# Patient Record
Sex: MALE | Race: WHITE | NOT HISPANIC OR LATINO | Employment: OTHER | URBAN - METROPOLITAN AREA
[De-identification: names, ages, dates, MRNs, and addresses within clinical notes are randomized per-mention and may not be internally consistent; named-entity substitution may affect disease eponyms.]

---

## 2023-04-18 ENCOUNTER — HOSPITAL ENCOUNTER (OUTPATIENT)
Dept: DATA CONVERSION | Facility: HOSPITAL | Age: 63
End: 2023-04-18
Attending: ORTHOPAEDIC SURGERY | Admitting: ORTHOPAEDIC SURGERY
Payer: COMMERCIAL

## 2023-04-18 DIAGNOSIS — J43.9 EMPHYSEMA, UNSPECIFIED (MULTI): ICD-10-CM

## 2023-04-18 DIAGNOSIS — Z96.659 PRESENCE OF UNSPECIFIED ARTIFICIAL KNEE JOINT: ICD-10-CM

## 2023-04-18 DIAGNOSIS — N52.9 MALE ERECTILE DYSFUNCTION, UNSPECIFIED: ICD-10-CM

## 2023-04-18 DIAGNOSIS — Z87.891 PERSONAL HISTORY OF NICOTINE DEPENDENCE: ICD-10-CM

## 2023-04-18 DIAGNOSIS — G56.01 CARPAL TUNNEL SYNDROME, RIGHT UPPER LIMB: ICD-10-CM

## 2023-04-18 DIAGNOSIS — Z96.649 PRESENCE OF UNSPECIFIED ARTIFICIAL HIP JOINT: ICD-10-CM

## 2023-04-18 DIAGNOSIS — I87.2 VENOUS INSUFFICIENCY (CHRONIC) (PERIPHERAL): ICD-10-CM

## 2023-04-18 DIAGNOSIS — M19.90 UNSPECIFIED OSTEOARTHRITIS, UNSPECIFIED SITE: ICD-10-CM

## 2023-04-18 DIAGNOSIS — G62.9 POLYNEUROPATHY, UNSPECIFIED: ICD-10-CM

## 2023-04-18 DIAGNOSIS — K21.9 GASTRO-ESOPHAGEAL REFLUX DISEASE WITHOUT ESOPHAGITIS: ICD-10-CM

## 2023-04-18 DIAGNOSIS — D64.9 ANEMIA, UNSPECIFIED: ICD-10-CM

## 2023-04-18 DIAGNOSIS — I48.20 CHRONIC ATRIAL FIBRILLATION, UNSPECIFIED (MULTI): ICD-10-CM

## 2023-04-18 DIAGNOSIS — I25.2 OLD MYOCARDIAL INFARCTION: ICD-10-CM

## 2023-04-18 DIAGNOSIS — I10 ESSENTIAL (PRIMARY) HYPERTENSION: ICD-10-CM

## 2023-04-18 DIAGNOSIS — Z79.01 LONG TERM (CURRENT) USE OF ANTICOAGULANTS: ICD-10-CM

## 2023-04-18 DIAGNOSIS — E66.9 OBESITY, UNSPECIFIED: ICD-10-CM

## 2023-04-18 LAB
ANION GAP IN SER/PLAS: 12 MMOL/L (ref 10–20)
ATRIAL RATE: 0 BPM
CARBON DIOXIDE, TOTAL (MMOL/L) IN SER/PLAS: 25 MMOL/L (ref 21–32)
CHLORIDE (MMOL/L) IN SER/PLAS: 106 MMOL/L (ref 98–107)
HEMATOCRIT (%) IN BLOOD BY AUTOMATED COUNT: 36.9 % (ref 41–52)
HEMOGLOBIN (G/DL) IN BLOOD: 12 G/DL (ref 13.5–17.5)
POTASSIUM (MMOL/L) IN SER/PLAS: 4.1 MMOL/L (ref 3.5–5.3)
PR INTERVAL: 73 MS
Q ONSET: 249 MS
QRS COUNT: 9 BEATS
QRS DURATION: 112 MS
QT INTERVAL: 434 MS
QTC CALCULATION(BAZETT): 400 MS
QTC FREDERICIA: 411 MS
R AXIS: -37 DEGREES
SODIUM (MMOL/L) IN SER/PLAS: 139 MMOL/L (ref 136–145)
T AXIS: 21 DEGREES
T OFFSET: 466 MS
VENTRICULAR RATE: 51 BPM

## 2023-06-09 LAB
ALANINE AMINOTRANSFERASE (SGPT) (U/L) IN SER/PLAS: 15 U/L (ref 10–52)
ALBUMIN (G/DL) IN SER/PLAS: 4.2 G/DL (ref 3.4–5)
ALKALINE PHOSPHATASE (U/L) IN SER/PLAS: 118 U/L (ref 33–136)
ANION GAP IN SER/PLAS: 12 MMOL/L (ref 10–20)
ASPARTATE AMINOTRANSFERASE (SGOT) (U/L) IN SER/PLAS: 17 U/L (ref 9–39)
BILIRUBIN TOTAL (MG/DL) IN SER/PLAS: 0.7 MG/DL (ref 0–1.2)
CALCIDIOL (25 OH VITAMIN D3) (NG/ML) IN SER/PLAS: 34 NG/ML
CALCIUM (MG/DL) IN SER/PLAS: 9.7 MG/DL (ref 8.6–10.6)
CARBON DIOXIDE, TOTAL (MMOL/L) IN SER/PLAS: 26 MMOL/L (ref 21–32)
CHLORIDE (MMOL/L) IN SER/PLAS: 106 MMOL/L (ref 98–107)
COBALAMIN (VITAMIN B12) (PG/ML) IN SER/PLAS: 504 PG/ML (ref 211–911)
CREATININE (MG/DL) IN SER/PLAS: 0.96 MG/DL (ref 0.5–1.3)
ERYTHROCYTE DISTRIBUTION WIDTH (RATIO) BY AUTOMATED COUNT: 14.6 % (ref 11.5–14.5)
ERYTHROCYTE MEAN CORPUSCULAR HEMOGLOBIN CONCENTRATION (G/DL) BY AUTOMATED: 32.1 G/DL (ref 32–36)
ERYTHROCYTE MEAN CORPUSCULAR VOLUME (FL) BY AUTOMATED COUNT: 94 FL (ref 80–100)
ERYTHROCYTES (10*6/UL) IN BLOOD BY AUTOMATED COUNT: 4.49 X10E12/L (ref 4.5–5.9)
FERRITIN (UG/LL) IN SER/PLAS: 57 UG/L (ref 20–300)
GFR MALE: 88 ML/MIN/1.73M2
GLUCOSE (MG/DL) IN SER/PLAS: 104 MG/DL (ref 74–99)
HEMATOCRIT (%) IN BLOOD BY AUTOMATED COUNT: 42.4 % (ref 41–52)
HEMOGLOBIN (G/DL) IN BLOOD: 13.6 G/DL (ref 13.5–17.5)
INR IN PPP BY COAGULATION ASSAY: 1.4 (ref 0.9–1.1)
IRON (UG/DL) IN SER/PLAS: 114 UG/DL (ref 35–150)
LEUKOCYTES (10*3/UL) IN BLOOD BY AUTOMATED COUNT: 5.3 X10E9/L (ref 4.4–11.3)
NRBC (PER 100 WBCS) BY AUTOMATED COUNT: 0 /100 WBC (ref 0–0)
PLATELETS (10*3/UL) IN BLOOD AUTOMATED COUNT: 292 X10E9/L (ref 150–450)
POTASSIUM (MMOL/L) IN SER/PLAS: 4.5 MMOL/L (ref 3.5–5.3)
PROSTATE SPECIFIC ANTIGEN,SCREEN: 0.39 NG/ML (ref 0–4)
PROTEIN TOTAL: 7 G/DL (ref 6.4–8.2)
PROTHROMBIN TIME (PT) IN PPP BY COAGULATION ASSAY: 16.2 SEC (ref 9.8–13.4)
SODIUM (MMOL/L) IN SER/PLAS: 139 MMOL/L (ref 136–145)
UREA NITROGEN (MG/DL) IN SER/PLAS: 21 MG/DL (ref 6–23)

## 2023-06-21 ENCOUNTER — OFFICE VISIT (OUTPATIENT)
Dept: PRIMARY CARE | Facility: CLINIC | Age: 63
End: 2023-06-21
Payer: COMMERCIAL

## 2023-06-21 VITALS
DIASTOLIC BLOOD PRESSURE: 86 MMHG | TEMPERATURE: 97.1 F | HEIGHT: 77 IN | OXYGEN SATURATION: 95 % | WEIGHT: 315 LBS | HEART RATE: 85 BPM | SYSTOLIC BLOOD PRESSURE: 129 MMHG | BODY MASS INDEX: 37.19 KG/M2

## 2023-06-21 DIAGNOSIS — N52.01 ERECTILE DYSFUNCTION DUE TO ARTERIAL INSUFFICIENCY: ICD-10-CM

## 2023-06-21 DIAGNOSIS — E55.9 VITAMIN D DEFICIENCY: ICD-10-CM

## 2023-06-21 DIAGNOSIS — I10 BENIGN ESSENTIAL HYPERTENSION: ICD-10-CM

## 2023-06-21 DIAGNOSIS — E78.1 ESSENTIAL HYPERTRIGLYCERIDEMIA: ICD-10-CM

## 2023-06-21 DIAGNOSIS — Z12.11 ENCOUNTER FOR SCREENING COLONOSCOPY: ICD-10-CM

## 2023-06-21 DIAGNOSIS — I48.0 PAROXYSMAL ATRIAL FIBRILLATION (MULTI): Primary | ICD-10-CM

## 2023-06-21 DIAGNOSIS — Z13.29 THYROID DISORDER SCREEN: ICD-10-CM

## 2023-06-21 PROBLEM — N52.9 ERECTILE DYSFUNCTION: Status: ACTIVE | Noted: 2023-06-21

## 2023-06-21 PROBLEM — H69.93 DYSFUNCTION OF BOTH EUSTACHIAN TUBES: Status: ACTIVE | Noted: 2023-06-21

## 2023-06-21 PROBLEM — K21.9 GERD (GASTROESOPHAGEAL REFLUX DISEASE): Status: ACTIVE | Noted: 2023-06-21

## 2023-06-21 PROBLEM — M77.31 CALCANEAL SPUR OF RIGHT FOOT: Status: ACTIVE | Noted: 2023-06-21

## 2023-06-21 PROBLEM — M54.12 CERVICAL RADICULOPATHY: Status: ACTIVE | Noted: 2023-06-21

## 2023-06-21 PROBLEM — I48.91 ATRIAL FIBRILLATION (MULTI): Status: ACTIVE | Noted: 2023-06-21

## 2023-06-21 PROBLEM — K44.9 HIATAL HERNIA: Status: ACTIVE | Noted: 2023-06-21

## 2023-06-21 PROBLEM — D64.9 ANEMIA: Status: ACTIVE | Noted: 2023-06-21

## 2023-06-21 PROBLEM — R22.2 MASS OF CHEST WALL, RIGHT: Status: ACTIVE | Noted: 2023-06-21

## 2023-06-21 PROBLEM — H61.23 BILATERAL IMPACTED CERUMEN: Status: RESOLVED | Noted: 2023-06-21 | Resolved: 2023-06-21

## 2023-06-21 PROBLEM — H61.23 BILATERAL IMPACTED CERUMEN: Status: ACTIVE | Noted: 2023-06-21

## 2023-06-21 PROBLEM — G56.03 CARPAL TUNNEL SYNDROME, BILATERAL: Status: ACTIVE | Noted: 2023-06-21

## 2023-06-21 PROBLEM — M79.671 PAIN OF RIGHT HEEL: Status: RESOLVED | Noted: 2023-06-21 | Resolved: 2023-06-21

## 2023-06-21 PROBLEM — I83.90 VARICOSITIES OF LEG: Status: ACTIVE | Noted: 2023-06-21

## 2023-06-21 PROBLEM — G56.01 CARPAL TUNNEL SYNDROME OF RIGHT WRIST: Status: ACTIVE | Noted: 2023-06-21

## 2023-06-21 PROBLEM — D22.9 SKIN MOLE: Status: ACTIVE | Noted: 2023-06-21

## 2023-06-21 PROBLEM — M79.671 PAIN OF RIGHT HEEL: Status: ACTIVE | Noted: 2023-06-21

## 2023-06-21 PROBLEM — H69.93 DYSFUNCTION OF BOTH EUSTACHIAN TUBES: Status: RESOLVED | Noted: 2023-06-21 | Resolved: 2023-06-21

## 2023-06-21 PROBLEM — H90.3 BILATERAL HIGH FREQUENCY SENSORINEURAL HEARING LOSS: Status: ACTIVE | Noted: 2023-06-21

## 2023-06-21 PROCEDURE — 1036F TOBACCO NON-USER: CPT | Performed by: FAMILY MEDICINE

## 2023-06-21 PROCEDURE — 3074F SYST BP LT 130 MM HG: CPT | Performed by: FAMILY MEDICINE

## 2023-06-21 PROCEDURE — 1160F RVW MEDS BY RX/DR IN RCRD: CPT | Performed by: FAMILY MEDICINE

## 2023-06-21 PROCEDURE — 3008F BODY MASS INDEX DOCD: CPT | Performed by: FAMILY MEDICINE

## 2023-06-21 PROCEDURE — 99214 OFFICE O/P EST MOD 30 MIN: CPT | Performed by: FAMILY MEDICINE

## 2023-06-21 PROCEDURE — 1159F MED LIST DOCD IN RCRD: CPT | Performed by: FAMILY MEDICINE

## 2023-06-21 PROCEDURE — 3079F DIAST BP 80-89 MM HG: CPT | Performed by: FAMILY MEDICINE

## 2023-06-21 RX ORDER — RIVAROXABAN 20 MG/1
1 TABLET, FILM COATED ORAL DAILY
COMMUNITY
Start: 2019-11-05 | End: 2023-06-21 | Stop reason: SDUPTHER

## 2023-06-21 RX ORDER — SILDENAFIL 100 MG/1
100 TABLET, FILM COATED ORAL DAILY PRN
Qty: 12 TABLET | Refills: 3 | Status: SHIPPED | OUTPATIENT
Start: 2023-06-21 | End: 2023-10-26

## 2023-06-21 RX ORDER — DILTIAZEM HYDROCHLORIDE 120 MG/1
1 CAPSULE, COATED, EXTENDED RELEASE ORAL DAILY
COMMUNITY
Start: 2023-04-16

## 2023-06-21 RX ORDER — GUAIFENESIN AND PSEUDOEPHEDRINE HCL 1200; 120 MG/1; MG/1
1 TABLET, EXTENDED RELEASE ORAL DAILY
COMMUNITY
Start: 2014-12-16 | End: 2023-06-21 | Stop reason: WASHOUT

## 2023-06-21 RX ORDER — HYDROCHLOROTHIAZIDE 25 MG/1
1 TABLET ORAL DAILY
Status: ON HOLD | COMMUNITY
Start: 2020-08-26 | End: 2024-04-25 | Stop reason: ENTERED-IN-ERROR

## 2023-06-21 RX ORDER — CEFADROXIL 500 MG/1
1 CAPSULE ORAL 2 TIMES DAILY
COMMUNITY
Start: 2023-03-06 | End: 2023-06-21 | Stop reason: WASHOUT

## 2023-06-21 RX ORDER — FUROSEMIDE 20 MG/1
20 TABLET ORAL DAILY
COMMUNITY
Start: 2023-05-12

## 2023-06-21 RX ORDER — CHOLECALCIFEROL (VITAMIN D3) 125 MCG
5000 CAPSULE ORAL DAILY
COMMUNITY

## 2023-06-21 ASSESSMENT — ENCOUNTER SYMPTOMS
FEVER: 0
CHEST TIGHTNESS: 0
DEPRESSION: 0
ARTHRALGIAS: 0
SHORTNESS OF BREATH: 0
LOSS OF SENSATION IN FEET: 0
CONFUSION: 0
CHILLS: 0
OCCASIONAL FEELINGS OF UNSTEADINESS: 0
PALPITATIONS: 0
ABDOMINAL PAIN: 0

## 2023-06-21 ASSESSMENT — PATIENT HEALTH QUESTIONNAIRE - PHQ9
2. FEELING DOWN, DEPRESSED OR HOPELESS: NOT AT ALL
1. LITTLE INTEREST OR PLEASURE IN DOING THINGS: NOT AT ALL
SUM OF ALL RESPONSES TO PHQ9 QUESTIONS 1 AND 2: 0

## 2023-06-21 NOTE — PROGRESS NOTES
"Subjective   Patient ID: Rambo Daigle is a 63 y.o. male who presents for Annual Exam.    HPI patient today for follow-up of ongoing healthcare issues overall is doing well status post hip replacement surgery went well still recovering.  Also would like to get a refill on erectile dysfunction medication.  Is a refill on his Xarelto.  Review of Systems   Constitutional:  Negative for chills and fever.   HENT:  Negative for congestion and ear pain.    Eyes:  Negative for visual disturbance.   Respiratory:  Negative for chest tightness and shortness of breath.    Cardiovascular:  Negative for chest pain and palpitations.   Gastrointestinal:  Negative for abdominal pain.   Musculoskeletal:  Negative for arthralgias.   Skin:  Negative for pallor.   Psychiatric/Behavioral:  Negative for confusion.        Objective   /86   Pulse 85   Temp 36.2 °C (97.1 °F)   Ht 1.956 m (6' 5\")   Wt 147 kg (324 lb)   SpO2 95%   BMI 38.42 kg/m²     Physical Exam  Vitals and nursing note reviewed.   Constitutional:       General: He is not in acute distress.     Appearance: Normal appearance. He is not ill-appearing.   HENT:      Head: Normocephalic and atraumatic.      Right Ear: Tympanic membrane, ear canal and external ear normal.      Left Ear: Tympanic membrane, ear canal and external ear normal.      Mouth/Throat:      Pharynx: Oropharynx is clear.   Eyes:      Extraocular Movements: Extraocular movements intact.   Cardiovascular:      Rate and Rhythm: Normal rate and regular rhythm.      Pulses: Normal pulses.      Heart sounds: Normal heart sounds.      Comments: Bilateral lower extremity varicosities no tenderness no erythema no significant edema  Pulmonary:      Effort: Pulmonary effort is normal.      Breath sounds: Normal breath sounds.   Abdominal:      General: Abdomen is flat. Bowel sounds are normal.      Palpations: Abdomen is soft.      Tenderness: There is no abdominal tenderness.   Musculoskeletal:         " General: Normal range of motion.      Cervical back: Neck supple.   Skin:     General: Skin is warm.   Neurological:      Mental Status: He is alert and oriented to person, place, and time. Mental status is at baseline.   Psychiatric:         Mood and Affect: Mood normal.       Recent Results (from the past 1008 hour(s))   Ferritin    Collection Time: 06/09/23  7:40 AM   Result Value Ref Range    Ferritin 57 20 - 300 ug/L   Vitamin B12    Collection Time: 06/09/23  7:40 AM   Result Value Ref Range    Vitamin B-12 504 211 - 911 pg/mL   Protime-INR    Collection Time: 06/09/23  7:40 AM   Result Value Ref Range    Protime 16.2 (H) 9.8 - 13.4 sec    INR 1.4 (H) 0.9 - 1.1   Prostate Specific Antigen, Screen    Collection Time: 06/09/23  7:40 AM   Result Value Ref Range    Prostate Specific Antigen,Screen 0.39 0.00 - 4.00 ng/mL   Vitamin D, Total    Collection Time: 06/09/23  7:40 AM   Result Value Ref Range    Vitamin D, 25-Hydroxy 34 ng/mL   Iron    Collection Time: 06/09/23  7:40 AM   Result Value Ref Range    Iron 114 35 - 150 ug/dL   Comprehensive Metabolic Panel    Collection Time: 06/09/23  7:40 AM   Result Value Ref Range    Glucose 104 (H) 74 - 99 mg/dL    Sodium 139 136 - 145 mmol/L    Potassium 4.5 3.5 - 5.3 mmol/L    Chloride 106 98 - 107 mmol/L    Bicarbonate 26 21 - 32 mmol/L    Anion Gap 12 10 - 20 mmol/L    Urea Nitrogen 21 6 - 23 mg/dL    Creatinine 0.96 0.50 - 1.30 mg/dL    GFR MALE 88 >90 mL/min/1.73m2    Calcium 9.7 8.6 - 10.6 mg/dL    Albumin 4.2 3.4 - 5.0 g/dL    Alkaline Phosphatase 118 33 - 136 U/L    Total Protein 7.0 6.4 - 8.2 g/dL    AST 17 9 - 39 U/L    Total Bilirubin 0.7 0.0 - 1.2 mg/dL    ALT (SGPT) 15 10 - 52 U/L   CBC    Collection Time: 06/09/23  7:40 AM   Result Value Ref Range    WBC 5.3 4.4 - 11.3 x10E9/L    nRBC 0.0 0.0 - 0.0 /100 WBC    RBC 4.49 (L) 4.50 - 5.90 x10E12/L    Hemoglobin 13.6 13.5 - 17.5 g/dL    Hematocrit 42.4 41.0 - 52.0 %    MCV 94 80 - 100 fL    MCHC 32.1 32.0 - 36.0  g/dL    Platelets 292 150 - 450 x10E9/L    RDW 14.6 (H) 11.5 - 14.5 %     Labs reviewed with patient  Necessary refills provided  Return to office in approximately 6 months with repeat fasting labs  Referral to his general surgeon update colonoscopy    Assessment/Plan   Problem List Items Addressed This Visit       Atrial fibrillation (CMS/HCC) - Primary     Stable continue to follow cardiology patient anticoagulated on Xarelto refill provided         Relevant Medications    dilTIAZem CD (Cardizem CD) 120 mg 24 hr capsule    rivaroxaban (Xarelto) 20 mg tablet    sildenafil (Viagra) 100 mg tablet    Other Relevant Orders    CBC    Follow Up In Primary Care    Benign essential hypertension     Stable continue current medication         Relevant Orders    CBC    Comprehensive Metabolic Panel    Follow Up In Primary Care    Erectile dysfunction     Sildenafil 100 mg as directed risk-benefit side effects reviewed he verbalizes understanding         Relevant Medications    sildenafil (Viagra) 100 mg tablet    Essential hypertriglyceridemia     Stable continue dietary modification         Relevant Orders    Comprehensive Metabolic Panel    Lipid Panel    Follow Up In Primary Care    Vitamin D deficiency     Continue to monitor and supplement as needed         Relevant Orders    Comprehensive Metabolic Panel    Vitamin D, Total    Follow Up In Primary Care    Thyroid disorder screen     TSH with reflex         Relevant Orders    TSH with reflex to Free T4 if abnormal    Encounter for screening colonoscopy     Referral to patient's general surgeon         Relevant Orders    Referral to General Surgery

## 2023-09-12 ENCOUNTER — OFFICE VISIT (OUTPATIENT)
Dept: PRIMARY CARE | Facility: CLINIC | Age: 63
End: 2023-09-12
Payer: COMMERCIAL

## 2023-09-12 VITALS
TEMPERATURE: 97.5 F | DIASTOLIC BLOOD PRESSURE: 82 MMHG | RESPIRATION RATE: 14 BRPM | WEIGHT: 315 LBS | SYSTOLIC BLOOD PRESSURE: 132 MMHG | HEART RATE: 77 BPM | BODY MASS INDEX: 39.61 KG/M2 | OXYGEN SATURATION: 96 %

## 2023-09-12 DIAGNOSIS — I87.2 VENOUS INSUFFICIENCY OF RIGHT LOWER EXTREMITY: ICD-10-CM

## 2023-09-12 DIAGNOSIS — M25.571 CHRONIC PAIN OF RIGHT ANKLE: ICD-10-CM

## 2023-09-12 DIAGNOSIS — G89.29 CHRONIC PAIN OF RIGHT ANKLE: ICD-10-CM

## 2023-09-12 DIAGNOSIS — I10 BENIGN ESSENTIAL HYPERTENSION: ICD-10-CM

## 2023-09-12 DIAGNOSIS — T14.8XXA SKIN ABRASION: ICD-10-CM

## 2023-09-12 DIAGNOSIS — L98.9 CHANGING SKIN LESION: Primary | ICD-10-CM

## 2023-09-12 PROCEDURE — 3008F BODY MASS INDEX DOCD: CPT | Performed by: FAMILY MEDICINE

## 2023-09-12 PROCEDURE — 3079F DIAST BP 80-89 MM HG: CPT | Performed by: FAMILY MEDICINE

## 2023-09-12 PROCEDURE — 1159F MED LIST DOCD IN RCRD: CPT | Performed by: FAMILY MEDICINE

## 2023-09-12 PROCEDURE — 3075F SYST BP GE 130 - 139MM HG: CPT | Performed by: FAMILY MEDICINE

## 2023-09-12 PROCEDURE — 1160F RVW MEDS BY RX/DR IN RCRD: CPT | Performed by: FAMILY MEDICINE

## 2023-09-12 PROCEDURE — 99213 OFFICE O/P EST LOW 20 MIN: CPT | Performed by: FAMILY MEDICINE

## 2023-09-12 ASSESSMENT — ENCOUNTER SYMPTOMS
CARDIOVASCULAR NEGATIVE: 1
RESPIRATORY NEGATIVE: 1
CONSTITUTIONAL NEGATIVE: 1

## 2023-09-12 NOTE — ASSESSMENT & PLAN NOTE
Right anterior shin skin abrasion appears to be healing well.  Small superficial opening still present no signs of infection continue local wound care and topical antibiotic to areas completely healed over

## 2023-09-12 NOTE — PROGRESS NOTES
Subjective   Patient ID: Rambo Daigle is a 63 y.o. male who presents for infected? wound on leg.    HPI patient was in today complaining of abrasion to his right shin happened about 10 days ago while splitting wood piece of wood came off the splitter scraped his right shin he has been treating it at home with topical antibiotic cream and dressings seems to appear healed over well there is just a small area that still draining slightly.  No redness no pus material.  Related to this he states he has noticed some swelling in the right leg this is not new tends to be better in the morning and gets worse as the day goes on it tends to cause some swelling and discomfort in the right ankle.  Finally his wife told him he has a changing skin lesion/mole on his right mid upper back.  Patient says it does not really bother him but thought he should mention it.    Review of Systems   Constitutional: Negative.    Respiratory: Negative.     Cardiovascular: Negative.    Skin:         Mole right back  Anterior right shin abrasion       Objective   /82   Pulse 77   Temp 36.4 °C (97.5 °F)   Resp 14   Wt (!) 152 kg (334 lb)   SpO2 96%   BMI 39.61 kg/m²     Physical Exam  Constitutional:       General: He is not in acute distress.     Appearance: Normal appearance. He is not ill-appearing.   Cardiovascular:      Rate and Rhythm: Normal rate and regular rhythm.      Heart sounds: Normal heart sounds.   Pulmonary:      Effort: Pulmonary effort is normal. No respiratory distress.      Breath sounds: Normal breath sounds.   Musculoskeletal:         General: Swelling present.      Right lower leg: Edema present.      Comments: Right ankle swelling with mild tenderness to palpation   Skin:     Findings: Lesion present.      Comments: Dark brown skin lesion right upper back    Anterior right shin reveals a healing abrasion with just a very small opening present minimal serosanguineous drainage but no signs of infection      Continue local wound care on the right anterior shin anticipate complete resolution in the next 2 weeks    Right lower extremity venous ultrasound  X-ray right ankle  Refer to vascular surgery for second opinion    Refer to dermatology for further evaluation of skin lesion of the back    Keep    Follow-up appointment in December with fasting labs and reassessment of his case    Assessment/Plan   Problem List Items Addressed This Visit       Benign essential hypertension     Stable continue treatment         Changing skin lesion - Primary     Right upper back changing skin lesion refer to dermatology         Relevant Orders    Referral to Dermatology    Venous insufficiency of right lower extremity     Right lower extremity venous ultrasound and refer to vascular surgery         Relevant Orders    Vascular US Lower Extremity Venous Insufficiency Right    Referral to Vascular Surgery    Chronic pain of right ankle     X-ray right ankle         Relevant Orders    XR ankle right 3+ views    Skin abrasion     Right anterior shin skin abrasion appears to be healing well.  Small superficial opening still present no signs of infection continue local wound care and topical antibiotic to areas completely healed over

## 2023-09-14 NOTE — PROGRESS NOTES
Service: Orthopaedics     Subjective Data:   GERSON SUÁREZ is a 63 year old Male who is Hospital Day # 1.    Objective Data:     Objective Information:    ORTHOPEDIC OPERATIVE NOTE    Name: Gerson Suárez  : 56  Surgeon: Elton Jackson DO  Facility: [facility]  Date of Surgery: 23     SURGEON:     Elton Jackson DO  PRE OP DIAGNOSIS:  Carpal Tunnel Syndrome right Wrist  POST OP DIAGNOSIS:  Same  PROCEDURE:   Endoscopic Carpal Tunnel Release right Wrist    ANESTHESIA:  Local with sedation  ASSISTANT:    SA Frye  COMPLICATIONS:   None.  CONDITION:    Satisfactory to PACU.  BLOOD LOSS: Minimal    INDICATION FOR PROCEDURE: The patient is a 63-year-old male who has failed nonsurgical management for right carpal tunnel syndrome. The patient was seen in the office, fully informed risks and benefits of the procedure, and elected to undergo the procedure.    PROCEDURE IN DETAIL: The patient was seen and consented preoperatively with side and site of surgery appropriately marked. The patient was taken back to the operative suite, placed supine on the operative table, and placed on monitor for the duration  of case. The patient was administered sedation and monitored throughout the entire surgery by Department of Anesthesia. While sedated, the patient was administered 5 cc of mixed marcaine and lidocaine to the volar aspect of wrist and palm for local anesthesia.  The operative upper extremity was then sterilely prepped and draped in sterile orthopedic fashion, elevated with an Esmarch, and tourniquet inflated to 250 mmHg for the duration of case, and time-out was performed confirming site of surgery and surgery  to be performed.    A 1-cm transverse incision was made to the ulnar aspect of the palmaris longus at proximal wrist crease. Skin and underlying tissues were sharply dissected down. Hemostasis maintained with bipolar electrocauterization. Distally based flap of the fascia  overlying the  median nerve was then released, and under direct visualization, proximal fascia was released with Littler scissors. The elevator and dilator were then placed in the carpal tunnel with appropriate ease of passage and corrugated feel of the  undersurface of transcarpal ligament. The endoscope was then placed under direct visualization. The transverse carpal ligament was released in its entirety, confirmed both visually as well as by utilizing endoscope as a probe, which freely passed following  release. The nerve was evaluated. No evidence of lesion or adhesion was present.    The wound was irrigated with sterile saline, closed with 5-0 nylon suture. Sterile dressing was then placed of Xeroform and 4x4s affixed with Kerlix and Ace wrap. Tourniquet was deflated, and the patient was taken to PACU in satisfactory condition.    Electronically signed  Elton Jackson DO     Assessment and Plan:   Code Status:  ·  Code Status Full Code     Advance Care Planning:  Advance Care Planning: Patient didn't wish to or  was unable to provide advance care plan       Electronic Signatures:  NIGEL Jackson James ()  (Signed 18-Apr-2023 09:47)   Authored: Service, Subjective Data, Objective Data, Assessment  and Plan, Note Completion      Last Updated: 18-Apr-2023 09:47 by NIGEL Jackson James ()

## 2023-09-14 NOTE — H&P
History & Physical Reviewed:   I have reviewed the History and Physical dated:  03-Apr-2023   History and Physical reviewed and relevant findings noted. Patient examined to review pertinent physical  findings.: No significant changes   Home Medications Reviewed: no changes noted   Allergies Reviewed: no changes noted       ERAS (Enhanced Recovery After Surgery):  ·  ERAS Patient: no     Consent:   COVID-19 Consent:  ·  COVID-19 Risk Consent Surgeon has reviewed key risks related to the risk of christine COVID-19 and if they contract COVID-19 what the risks are.       Electronic Signatures:  NIGEL Jackson James ()  (Signed 18-Apr-2023 08:51)   Authored: History & Physical Reviewed, ERAS, Consent,  Note Completion      Last Updated: 18-Apr-2023 08:51 by NIGEL Jackson James ()

## 2023-09-18 ENCOUNTER — HOSPITAL ENCOUNTER (OUTPATIENT)
Dept: DATA CONVERSION | Facility: HOSPITAL | Age: 63
End: 2023-09-18
Attending: SURGERY | Admitting: SURGERY
Payer: MEDICARE

## 2023-09-18 DIAGNOSIS — K63.5 POLYP OF COLON: ICD-10-CM

## 2023-09-18 DIAGNOSIS — M77.31 CALCANEAL SPUR OF RIGHT FOOT: Primary | ICD-10-CM

## 2023-09-18 DIAGNOSIS — Z12.11 ENCOUNTER FOR SCREENING FOR MALIGNANT NEOPLASM OF COLON: ICD-10-CM

## 2023-09-18 DIAGNOSIS — M25.571 CHRONIC PAIN OF RIGHT ANKLE: ICD-10-CM

## 2023-09-18 DIAGNOSIS — G89.29 CHRONIC PAIN OF RIGHT ANKLE: ICD-10-CM

## 2023-09-18 DIAGNOSIS — Z86.010 PERSONAL HISTORY OF COLONIC POLYPS: ICD-10-CM

## 2023-09-25 LAB
COMPLETE PATHOLOGY REPORT: NORMAL
CONVERTED CLINICAL DIAGNOSIS-HISTORY: NORMAL
CONVERTED FINAL DIAGNOSIS: NORMAL
CONVERTED FINAL REPORT PDF LINK TO COPY AND PASTE: NORMAL
CONVERTED GROSS DESCRIPTION: NORMAL

## 2023-09-29 VITALS — WEIGHT: 315 LBS | BODY MASS INDEX: 37.19 KG/M2 | HEIGHT: 77 IN

## 2023-10-04 NOTE — H&P (VIEW-ONLY)
Diagnoses/Problems  Assessed    · Carpal tunnel syndrome of left wrist (354.0) (G56.02)    Provider Impressions    1. Left CTS  Surgery discussion: I discussed the diagnosis and treatment options with the patient today along with their associated risks, benefits, alternatives, complications, and reasonable expectations. After thorough discussion, the patient has elected to proceed with surgical intervention. The patient understands the risks of bleeding, infection, loss of life or limb, pain, loss of motion, failure of the goals of surgery or the potential need for additional surgery. The patient would like to accept these risks and proceed. All questions were answered to the patients satisfaction and the patient seems satisfied with the plan.   Plan left ECTR  Fu 2 weeks after - NO XR      Chief Complaint    Follow up left CTS, wants to discuss surgery. He is S/P right endoscopic carpal tunnel release 4/18/2023- which is doing great.      History of Present Illness  GERSON SUÁREZ is a 63 year male who follows up today with left hand numbness, tingling, weakness and pain. Patient reports symptoms for months, getting worse. Patient reports numbness and tingling. Reports no past surgeries, injections, or trauma to the area. Pain worse with use, better with rest. Pain numb and tingly. Got EMG. Had right done, wants left done.      Review of Systems    Constitutional: no fever, no chills, not feeling tired, no recent weight gain and no recent weight loss.   ENT: no nosebleeds.   Cardiovascular: no chest pain.   Respiratory: no shortness of breath and no cough.   Gastrointestinal: no abdominal pain, no nausea, no diarrhea and no vomiting.   Musculoskeletal: no arthralgias.   Integumentary: no rashes and no skin wound.   Neurological: no headache.   Psychiatric: no depression and no sleep disturbances.   Endocrine: no muscle cramps.   Hematologic/Lymphatic: no swollen glands and no tendency for easy bruising.   All other  systems have been reviewed and are negative for complaint.      Active Problems  Problems    · Anemia (285.9) (D64.9)   · Atrial fibrillation (427.31) (I48.91)   · Benign essential hypertension (401.1) (I10)   · Bilateral high frequency sensorineural hearing loss (389.18) (H90.3)   · BMI 37.0-37.9, adult (V85.37) (Z68.37)   · BMI 38.0-38.9,adult (V85.38) (Z68.38)   · Calcaneal spur of right foot (726.73) (M77.31)   · Carpal tunnel syndrome of left wrist (354.0) (G56.02)   · Carpal tunnel syndrome of right wrist (354.0) (G56.01)   · Carpal tunnel syndrome, bilateral (354.0) (G56.03)   · Cervical radiculopathy (723.4) (M54.12)   · Class 2 severe obesity due to excess calories with serious comorbidity and body mass  index (BMI) of 37.0 to 37.9 in adult (278.01,V85.37) (E66.01,Z68.37)   · Dysfunction of both eustachian tubes (381.81) (H69.93)   · Encounter for immunization (V03.89) (Z23)   · Encounter for screening fecal occult blood testing (V76.51) (Z12.11)   · Erectile dysfunction (607.84) (N52.9)   · Essential hypertriglyceridemia (272.1) (E78.1)   · GERD (gastroesophageal reflux disease) (530.81) (K21.9)   · Hiatal hernia (553.3) (K44.9)   · Influenza vaccine refused (V64.06) (Z28.21)   · Mass of chest wall, right (786.6) (R22.2)   · Pain of right heel (729.5) (M79.671)   · Preop examination (V72.84) (Z01.818)   · Refused influenza vaccine (V64.06) (Z28.21)   · Screening for hyperlipidemia (V77.91) (Z13.220)   · Screening for prostate cancer (V76.44) (Z12.5)   · Skin mole (216.9) (D22.9)   · Thyroid disorder screen (V77.0) (Z13.29)   · 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)   · Vitamin D deficiency (268.9) (E55.9)    Past Medical History  Problems    · History of Crushing injury of right middle finger, initial encounter (927.3) (S67.192A)   · Resolved Date: 03 Feb 2020   · History of arthritis (V13.4) (Z87.39)   · History of hypertension (V12.59) (Z86.79)   · History of Open displaced fracture of distal phalanx of  right middle finger, initial encounter  (816.12) (S62.632B)   · Resolved Date: 03 Feb 2020    Surgical History  Problems    · History of Hip surgery   · History of Knee surgery    Family History  Mother    · No family history of breast cancer (V49.89) (Z78.9)  Other    · Family history of atrial fibrillation (V17.49) (Z82.49)   · Family history of deafness or hearing loss (V19.2) (Z82.2)    Social History  Problems    · Alcohol use (V49.89) (Z78.9)   · Caffeine use (V49.89) (Z78.9)   · Current smoker (305.1) (F17.200)    Allergies  Medication    · No Known Drug Allergies   Recorded By: Kelsi Escudero; 11/17/2016 7:56:43 PM    Current Meds    Medication Name Instruction   dilTIAZem HCl ER Coated Beads 120 MG Oral Capsule Extended Release 24 Hour    hydroCHLOROthiazide 25 MG Oral Tablet Take 1 tablet daily   Xarelto 20 MG Oral Tablet Take 1 tablet daily     Vitals  Vital Signs    Recorded: [id number] 03:15PM   Height 6 ft 5 in   Weight 334 lb    BMI Calculated 39.61 kg/m2   BSA Calculated 2.78     Physical Exam  Carpal Tunnel Exam Inspection: no evidence of infection, no edema, no erythema, no ecchymosis, Palpation: compartments are soft, no pain with palpation, Range of Motion: full wrist and finger range of motion, Stability: no wrist instability detected, Strength: 5/5 APB and intrinsics, Skin: intact, Vascular: capillary refill <2 seconds distally, Sensation: decreased in the median nerve distribution, Test: positive Tinel's at the Carpal Tunnel, positive Direct Carpal Tunnel Compression Test     Constitutional   General appearance: Alert and in no acute distress. Well developed, well nourished.    Ears, Nose, Mouth, and Throat   External inspection of ears and nose: Normal.   Neck   Neck: No neck mass was observed. Supple.   Pulmonary   Respiratory effort: No respiratory distress.   Auscultation of lungs: Clear bilateral breath sounds.   Cardiovascular   Examination of extremities: No peripheral edema.    Auscultation of heart: Heart rate and rhythm were normal, normal S1 and S2, no gallops, no murmurs and no pericardial rub.   Abdomen   Abdomen: Normal bowel sounds, soft nontender; no abdominal mass palpated.. No rebound, rigidity or guarding.    Skin   Skin and subcutaneous tissue: Normal skin color and pigmentation, normal skin turgor, and no rash.   Neurologic   Sensation: Normal.   Psychiatric   Judgment and insight: Intact.   Orientation to person, place, and time: Alert and oriented x 3.   Mood and affect: Normal.      Results/Data  EMG - severe right and mod left CTS      Signatures   Electronically signed by : Bhupendra Jackson II, DO; Sep 20 2023  3:33PM EST (Author)

## 2023-10-08 ENCOUNTER — ANESTHESIA EVENT (OUTPATIENT)
Dept: OPERATING ROOM | Facility: HOSPITAL | Age: 63
End: 2023-10-08
Payer: COMMERCIAL

## 2023-10-08 RX ORDER — MORPHINE SULFATE 2 MG/ML
2 INJECTION, SOLUTION INTRAMUSCULAR; INTRAVENOUS EVERY 5 MIN PRN
Status: CANCELLED | OUTPATIENT
Start: 2023-10-08

## 2023-10-08 RX ORDER — OXYCODONE HYDROCHLORIDE 5 MG/1
5 TABLET ORAL EVERY 4 HOURS PRN
Status: CANCELLED | OUTPATIENT
Start: 2023-10-08

## 2023-10-08 RX ORDER — LIDOCAINE HYDROCHLORIDE 10 MG/ML
0.1 INJECTION, SOLUTION EPIDURAL; INFILTRATION; INTRACAUDAL; PERINEURAL ONCE
Status: CANCELLED | OUTPATIENT
Start: 2023-10-08 | End: 2023-10-08

## 2023-10-08 RX ORDER — SODIUM CHLORIDE, SODIUM LACTATE, POTASSIUM CHLORIDE, CALCIUM CHLORIDE 600; 310; 30; 20 MG/100ML; MG/100ML; MG/100ML; MG/100ML
100 INJECTION, SOLUTION INTRAVENOUS CONTINUOUS
Status: CANCELLED | OUTPATIENT
Start: 2023-10-08

## 2023-10-10 ENCOUNTER — PHARMACY VISIT (OUTPATIENT)
Dept: PHARMACY | Facility: CLINIC | Age: 63
End: 2023-10-10
Payer: COMMERCIAL

## 2023-10-10 ENCOUNTER — HOSPITAL ENCOUNTER (OUTPATIENT)
Facility: HOSPITAL | Age: 63
Setting detail: OUTPATIENT SURGERY
Discharge: HOME | End: 2023-10-10
Attending: ORTHOPAEDIC SURGERY | Admitting: ORTHOPAEDIC SURGERY
Payer: COMMERCIAL

## 2023-10-10 ENCOUNTER — ANESTHESIA (OUTPATIENT)
Dept: OPERATING ROOM | Facility: HOSPITAL | Age: 63
End: 2023-10-10
Payer: COMMERCIAL

## 2023-10-10 VITALS
RESPIRATION RATE: 18 BRPM | HEART RATE: 52 BPM | DIASTOLIC BLOOD PRESSURE: 80 MMHG | TEMPERATURE: 97.1 F | HEIGHT: 76 IN | BODY MASS INDEX: 38.36 KG/M2 | OXYGEN SATURATION: 100 % | WEIGHT: 315 LBS | SYSTOLIC BLOOD PRESSURE: 118 MMHG

## 2023-10-10 DIAGNOSIS — G56.03 CARPAL TUNNEL SYNDROME, BILATERAL: Primary | ICD-10-CM

## 2023-10-10 PROCEDURE — 3700000001 HC GENERAL ANESTHESIA TIME - INITIAL BASE CHARGE: Performed by: ORTHOPAEDIC SURGERY

## 2023-10-10 PROCEDURE — 3700000002 HC GENERAL ANESTHESIA TIME - EACH INCREMENTAL 1 MINUTE: Performed by: ORTHOPAEDIC SURGERY

## 2023-10-10 PROCEDURE — 7100000010 HC PHASE TWO TIME - EACH INCREMENTAL 1 MINUTE: Performed by: ORTHOPAEDIC SURGERY

## 2023-10-10 PROCEDURE — 3600000003 HC OR TIME - INITIAL BASE CHARGE - PROCEDURE LEVEL THREE: Performed by: ORTHOPAEDIC SURGERY

## 2023-10-10 PROCEDURE — 2500000005 HC RX 250 GENERAL PHARMACY W/O HCPCS: Performed by: ORTHOPAEDIC SURGERY

## 2023-10-10 PROCEDURE — 2500000004 HC RX 250 GENERAL PHARMACY W/ HCPCS (ALT 636 FOR OP/ED): Performed by: ANESTHESIOLOGY

## 2023-10-10 PROCEDURE — 2500000004 HC RX 250 GENERAL PHARMACY W/ HCPCS (ALT 636 FOR OP/ED): Performed by: NURSE ANESTHETIST, CERTIFIED REGISTERED

## 2023-10-10 PROCEDURE — 7100000009 HC PHASE TWO TIME - INITIAL BASE CHARGE: Performed by: ORTHOPAEDIC SURGERY

## 2023-10-10 PROCEDURE — 2580000001 HC RX 258 IV SOLUTIONS: Performed by: NURSE ANESTHETIST, CERTIFIED REGISTERED

## 2023-10-10 PROCEDURE — 2500000005 HC RX 250 GENERAL PHARMACY W/O HCPCS: Performed by: NURSE ANESTHETIST, CERTIFIED REGISTERED

## 2023-10-10 PROCEDURE — 2500000005 HC RX 250 GENERAL PHARMACY W/O HCPCS

## 2023-10-10 PROCEDURE — 2580000001 HC RX 258 IV SOLUTIONS: Performed by: ANESTHESIOLOGY

## 2023-10-10 PROCEDURE — RXMED WILLOW AMBULATORY MEDICATION CHARGE

## 2023-10-10 PROCEDURE — 29848 WRIST ENDOSCOPY/SURGERY: CPT | Performed by: ORTHOPAEDIC SURGERY

## 2023-10-10 PROCEDURE — 2720000007 HC OR 272 NO HCPCS: Performed by: ORTHOPAEDIC SURGERY

## 2023-10-10 PROCEDURE — 3600000008 HC OR TIME - EACH INCREMENTAL 1 MINUTE - PROCEDURE LEVEL THREE: Performed by: ORTHOPAEDIC SURGERY

## 2023-10-10 RX ORDER — HYDROCODONE BITARTRATE AND ACETAMINOPHEN 5; 325 MG/1; MG/1
1 TABLET ORAL EVERY 6 HOURS PRN
Qty: 10 TABLET | Refills: 0 | Status: SHIPPED | OUTPATIENT
Start: 2023-10-10 | End: 2024-01-24 | Stop reason: WASHOUT

## 2023-10-10 RX ORDER — CEFAZOLIN SODIUM 1 G/50ML
SOLUTION INTRAVENOUS AS NEEDED
Status: DISCONTINUED | OUTPATIENT
Start: 2023-10-10 | End: 2023-10-10

## 2023-10-10 RX ORDER — PROPOFOL 10 MG/ML
INJECTION, EMULSION INTRAVENOUS AS NEEDED
Status: DISCONTINUED | OUTPATIENT
Start: 2023-10-10 | End: 2023-10-10

## 2023-10-10 RX ORDER — BUPIVACAINE HCL/EPINEPHRINE 0.5-1:200K
VIAL (ML) INJECTION AS NEEDED
Status: DISCONTINUED | OUTPATIENT
Start: 2023-10-10 | End: 2023-10-10 | Stop reason: HOSPADM

## 2023-10-10 RX ORDER — LIDOCAINE HYDROCHLORIDE AND EPINEPHRINE 20; 10 MG/ML; UG/ML
INJECTION, SOLUTION INFILTRATION; PERINEURAL AS NEEDED
Status: DISCONTINUED | OUTPATIENT
Start: 2023-10-10 | End: 2023-10-10 | Stop reason: HOSPADM

## 2023-10-10 RX ORDER — CEFAZOLIN SODIUM 2 G/100ML
INJECTION, SOLUTION INTRAVENOUS AS NEEDED
Status: DISCONTINUED | OUTPATIENT
Start: 2023-10-10 | End: 2023-10-10

## 2023-10-10 RX ORDER — PROPOFOL 10 MG/ML
INJECTION, EMULSION INTRAVENOUS CONTINUOUS PRN
Status: DISCONTINUED | OUTPATIENT
Start: 2023-10-10 | End: 2023-10-10

## 2023-10-10 RX ORDER — SODIUM CHLORIDE, SODIUM LACTATE, POTASSIUM CHLORIDE, CALCIUM CHLORIDE 600; 310; 30; 20 MG/100ML; MG/100ML; MG/100ML; MG/100ML
100 INJECTION, SOLUTION INTRAVENOUS CONTINUOUS
Status: DISCONTINUED | OUTPATIENT
Start: 2023-10-10 | End: 2023-10-10 | Stop reason: HOSPADM

## 2023-10-10 RX ORDER — FAMOTIDINE 10 MG/ML
20 INJECTION INTRAVENOUS ONCE
Status: COMPLETED | OUTPATIENT
Start: 2023-10-10 | End: 2023-10-10

## 2023-10-10 RX ORDER — FENTANYL CITRATE 50 UG/ML
INJECTION, SOLUTION INTRAMUSCULAR; INTRAVENOUS AS NEEDED
Status: DISCONTINUED | OUTPATIENT
Start: 2023-10-10 | End: 2023-10-10

## 2023-10-10 RX ORDER — LIDOCAINE HCL/PF 100 MG/5ML
SYRINGE (ML) INTRAVENOUS AS NEEDED
Status: DISCONTINUED | OUTPATIENT
Start: 2023-10-10 | End: 2023-10-10

## 2023-10-10 RX ADMIN — SODIUM CHLORIDE, POTASSIUM CHLORIDE, SODIUM LACTATE AND CALCIUM CHLORIDE: 600; 310; 30; 20 INJECTION, SOLUTION INTRAVENOUS at 12:00

## 2023-10-10 RX ADMIN — SODIUM CHLORIDE, POTASSIUM CHLORIDE, SODIUM LACTATE AND CALCIUM CHLORIDE 100 ML/HR: 600; 310; 30; 20 INJECTION, SOLUTION INTRAVENOUS at 10:35

## 2023-10-10 RX ADMIN — FAMOTIDINE 20 MG: 10 INJECTION, SOLUTION INTRAVENOUS at 10:35

## 2023-10-10 RX ADMIN — PROPOFOL 100 MCG/KG/MIN: 10 INJECTION, EMULSION INTRAVENOUS at 12:09

## 2023-10-10 RX ADMIN — CEFAZOLIN SODIUM 1 G: 1 INJECTION, SOLUTION INTRAVENOUS at 12:02

## 2023-10-10 RX ADMIN — LIDOCAINE HYDROCHLORIDE 70 MG: 20 INJECTION INTRAVENOUS at 12:06

## 2023-10-10 RX ADMIN — FENTANYL CITRATE 50 MCG: 50 INJECTION, SOLUTION INTRAMUSCULAR; INTRAVENOUS at 12:06

## 2023-10-10 RX ADMIN — PROPOFOL 70 MG: 10 INJECTION, EMULSION INTRAVENOUS at 12:07

## 2023-10-10 RX ADMIN — PROPOFOL 50 MG: 10 INJECTION, EMULSION INTRAVENOUS at 12:08

## 2023-10-10 RX ADMIN — CEFAZOLIN SODIUM 2 G: 2 INJECTION, SOLUTION INTRAVENOUS at 12:02

## 2023-10-10 SDOH — HEALTH STABILITY: MENTAL HEALTH: CURRENT SMOKER: 0

## 2023-10-10 ASSESSMENT — PAIN - FUNCTIONAL ASSESSMENT
PAIN_FUNCTIONAL_ASSESSMENT: 0-10
PAIN_FUNCTIONAL_ASSESSMENT: 0-10

## 2023-10-10 ASSESSMENT — COLUMBIA-SUICIDE SEVERITY RATING SCALE - C-SSRS
6. HAVE YOU EVER DONE ANYTHING, STARTED TO DO ANYTHING, OR PREPARED TO DO ANYTHING TO END YOUR LIFE?: NO
2. HAVE YOU ACTUALLY HAD ANY THOUGHTS OF KILLING YOURSELF?: NO
1. IN THE PAST MONTH, HAVE YOU WISHED YOU WERE DEAD OR WISHED YOU COULD GO TO SLEEP AND NOT WAKE UP?: NO

## 2023-10-10 ASSESSMENT — PAIN SCALES - GENERAL
PAINLEVEL_OUTOF10: 0 - NO PAIN
PAIN_LEVEL: 0

## 2023-10-10 NOTE — ANESTHESIA PREPROCEDURE EVALUATION
Patient: Rambo Daigle    Procedure Information       Date/Time: 10/10/23 1150    Procedure: LEFT ENDOSCOPIC CARPAL TUNNEL RELEASE (mac/local) (Left: Wrist)    Location: [facility] / [facility]    Surgeons: Bhupendra Jackson, DO            Relevant Problems   Cardiovascular   (+) Atrial fibrillation (CMS/HCC)   (+) Benign essential hypertension   (+) Essential hypertriglyceridemia      GI   (+) GERD (gastroesophageal reflux disease)   (+) Hiatal hernia      Neuro/Psych   (+) Carpal tunnel syndrome of right wrist   (+) Carpal tunnel syndrome, bilateral   (+) Cervical radiculopathy      Hematology   (+) Anemia      Musculoskeletal   (+) Carpal tunnel syndrome of right wrist   (+) Carpal tunnel syndrome, bilateral      Eyes, Ears, Nose, and Throat   (+) Bilateral high frequency sensorineural hearing loss       Clinical information reviewed:   Tobacco  Allergies  Meds   Med Hx  Surg Hx   Fam Hx  Soc Hx        NPO Detail:  NPO/Void Status  Date of Last Liquid: 10/09/23  Time of Last Liquid: 2345  Date of Last Solid: 10/09/23  Time of Last Solid: 1900  Last Intake Type: Clear fluids  Time of Last Void: 1010         Physical Exam    Airway  Mallampati: II  TM distance: >3 FB     Cardiovascular - normal exam     Dental - normal exam     Pulmonary - normal exam     Abdominal - normal exam             Anesthesia Plan    ASA 2     MAC     The patient is not a current smoker.    intravenous induction   Anesthetic plan and risks discussed with patient.  Use of blood products discussed with patient who.    Plan discussed with CRNA.

## 2023-10-10 NOTE — ANESTHESIA POSTPROCEDURE EVALUATION
Patient: Rambo Daigle    Procedure Summary       Date: 10/10/23 Room / Location: [facility] / [facility]    Anesthesia Start: 1200 Anesthesia Stop: 1229    Procedure: LEFT ENDOSCOPIC CARPAL TUNNEL RELEASE (mac/local) (Left: Wrist) Diagnosis: (g56.02)    Surgeons: Bhupendra Jackson DO Responsible Provider: Rambo Nam MD    Anesthesia Type: MAC ASA Status: 2            Anesthesia Type: MAC    Vitals Value Taken Time   /80 10/10/23 1245   Temp 36.2 °C (97.1 °F) 10/10/23 1245   Pulse 52 10/10/23 1245   Resp 18 10/10/23 1245   SpO2 100 % 10/10/23 1245       Anesthesia Post Evaluation    Patient location during evaluation: bedside  Patient participation: complete - patient participated  Level of consciousness: awake  Pain score: 0  Pain management: adequate  Airway patency: patent  Cardiovascular status: acceptable  Respiratory status: acceptable  Hydration status: acceptable        There were no known notable events for this encounter.    
patient

## 2023-10-10 NOTE — OP NOTE
ORTHOPEDIC OPERATIVE NOTE    Name: Rambo Daigle  : 1960  Surgeon: Elton Jackson DO  Facility: [facility]  Date of Surgery: 10/10/23  ORTHOPEDIC OPERATIVE NOTE    SURGEON:     Elton Jackson DO  PRE OP DIAGNOSIS:  Carpal Tunnel Syndrome left Wrist  POST OP DIAGNOSIS:  Same  PROCEDURE:   Endoscopic Carpal Tunnel Release left Wrist    ANESTHESIA:  Local with sedation  ASSISTANT:    SA Wright  COMPLICATIONS:   None.  CONDITION:    Satisfactory to PACU.  BLOOD LOSS: Minimal    INDICATION FOR PROCEDURE: The patient is a 63 y.o. -year-old male who has failed nonsurgical management for left carpal tunnel syndrome. The patient was seen in the office, fully informed risks and benefits of the procedure, and elected to undergo the procedure.    PROCEDURE IN DETAIL: The patient was seen and consented preoperatively with side and site of surgery appropriately marked. The patient was taken back to the operative suite, placed supine on the operative table, and placed on monitor for the duration of case. The patient was administered sedation and monitored throughout the entire surgery by Department of Anesthesia. While sedated, the patient was administered 5 cc of mixed marcaine and lidocaine to the volar aspect of wrist and palm for local anesthesia. The operative upper extremity was then sterilely prepped and draped in sterile orthopedic fashion, elevated with an Esmarch, and tourniquet inflated to 250 mmHg for the duration of case, and time-out was performed confirming site of surgery and surgery to be performed.    A 1-cm transverse incision was made to the ulnar aspect of the palmaris longus at proximal wrist crease. Skin and underlying tissues were sharply dissected down. Hemostasis maintained with bipolar electrocauterization. Distally based flap of the fascia overlying the median nerve was then released, and under direct visualization, proximal fascia was released with Littler scissors. The elevator and  dilator were then placed in the carpal tunnel with appropriate ease of passage and corrugated feel of the undersurface of transcarpal ligament. The endoscope was then placed under direct visualization. The transverse carpal ligament was released in its entirety, confirmed both visually as well as by utilizing endoscope as a probe, which freely passed following release. The nerve was evaluated. No evidence of lesion or adhesion was present.    The wound was irrigated with sterile saline, closed with 5-0 nylon suture. Sterile dressing was then placed of Xeroform and 4x4s affixed with Kerlix and Ace wrap. Tourniquet was deflated, and the patient was taken to PACU in satisfactory condition.    Electronically signed  Elton Jackson DO

## 2023-10-10 NOTE — DISCHARGE INSTRUCTIONS
[facility]  [phone]   Fax: [phone]    [address]    [address] [facility] -  [address] [place] [address] [id number] [place] [id number]    Upper Extremity   Pre and Post-Operative Instructions     The information and instructions provided on these pages outline the standard pre-operative and post-operative procedures for patients having Upper extremity surgeries. Please take the time to read this material which is designed to smooth your pre-operative visit, your stay in the hospital and minimize the chance of post-operative complications. If you have further questions or would like further information, please call our office at [phone].     Pre-Operative (Before Surgery)     1. Discontinue taking aspirin, aspirin products or anti-inflammatory medications one week before your surgery. Patients on Coumadin, Persantine, Trental, Plavix, or other “blood thinning” medications should discuss discontinuation of this medication with your surgeon and medical doctor prior to surgery.     2. The Pre-Admission Testing Center (PATC) at [facility] will contact you to discuss any pre-operative testing. If you qualify, they will complete the screening process over the phone or schedule an appointment to complete their required pre-operative testing. Please complete the “Tell US About Yourself” form and bring with you to your [facility] appointment or to the hospital if you qualify for the phone screening. The Physician or the Anesthesia team may need any or all of the following prior to surgery:  Pre-Operative Lab Tests       EKG  Physical Exam   Consent form   Physical or occupation therapy directives     3. Wear comfortable, loose fitting clothing the day of surgery. Remove all jewelry and all nail polish. Please leave all valuables at home.    4. You will need to contact the Surgery department the day prior to surgery between 2-4pm to verify and confirm your arrival  time and any final instructions. For Monday surgeries you will needs to call on Friday between 2-4pm.  [phone]    5. DO NOT EAT OR DRINK ANYTHING AFTER MIDNIGHT THE NIGHT BEFORE SURGERY.     6. You need a  to bring you home from surgery. (only one person)     7. The surgery department or doctor's office will call you to set up COVID-19 testing         Post-Operatively (After Surgery)    1. Post-Operative Course    Following surgery, your surgeon will see your family in the family call room. Once stable, and in the Post Anesthesia Care Unit (PACU), you will be transported to your hospital room or allowed to go home if an outpatient procedure.     2. Dressing    Most patients will have a soft bandage over the operative site.  This bandage can be removed in 48 hours and replaced in with Band-Aids.  If this is a splint (hard casting material), then do NOT remove the dressing until follow up. DO NOT GET DRESSING WET! Once at home, if your dressing, splint, or cast feels too tight or mistakenly gets wet, please contact the office. Further dressing instructions may be given at surgery.     If your sutures are visible (black strings), they will be removed about 10-14 days after surgery.  If you do not see any sutures, then they are absorbable and will not need to be removed.  In either case, you should have an appointment to see your physician 10-14 after surgery.    3. Activity     Most people underestimate the length of time required to fully recover from surgery. If you had a block (where your arm feels numb), the sensation typically returns around 18-24 hours later.  It is recommended you take some pain medication the evening following your surgery so when the block wears off (in the middle of the night), you are not in severe pain.   With pain medication, start at the lowest dose that controls your pain.       After the first 1-3 days, we encourage you to balance your activity between ambulation, sitting in  a chair,   and resting in bed with your arm elevated. Let swelling and pain be your guide to activity, you should   avoid prolonged (over 20 minutes) standing or sitting. In general, limit your activities to home for the first   1-3 days.    4. Pain    You will be discharged to home with a prescription for oral pain medication. A most important factor in pain control is rest and elevation. Ice may also be applied to the operative site for 30 minute intervals. Please use a waterproof bag for ice or cold packs.  Again, as you feel the numbness resolving and some onset of discomfort, please take pain medication, don't wait till full resolution of block.   Try to use the lowest dose of pain medication that controls your pain.      The pain medication may cause constipation. Drink plenty of fluids, eat fruits and vegetables. You may use an over the counter laxative or stool softener if necessary. Take pain medication with some food in your stomach, as prescribed by your pharmacist.     5. Elevation   Elevation of the operative extremity is critical. Elevation reduces swelling and minimizes pain. Less swelling is associated with a lower infectious rate, fewer wound complications, less post-operative stiffness, and more rapid recovery of function. To keep the swelling down, your hand must be kept above the level of your heart.

## 2023-10-12 ENCOUNTER — HOSPITAL ENCOUNTER (OUTPATIENT)
Dept: VASCULAR MEDICINE | Facility: HOSPITAL | Age: 63
Discharge: HOME | End: 2023-10-12
Payer: COMMERCIAL

## 2023-10-12 DIAGNOSIS — I87.2 VENOUS INSUFFICIENCY OF RIGHT LEG: ICD-10-CM

## 2023-10-12 PROCEDURE — 93971 EXTREMITY STUDY: CPT | Performed by: INTERNAL MEDICINE

## 2023-10-12 PROCEDURE — 93971 EXTREMITY STUDY: CPT

## 2023-10-17 PROBLEM — G89.29 CHRONIC PAIN OF RIGHT KNEE: Status: ACTIVE | Noted: 2019-11-25

## 2023-10-17 PROBLEM — Z96.641 PRESENCE OF RIGHT ARTIFICIAL HIP JOINT: Status: ACTIVE | Noted: 2019-11-25

## 2023-10-17 PROBLEM — E66.01 CLASS 2 SEVERE OBESITY DUE TO EXCESS CALORIES WITH SERIOUS COMORBIDITY AND BODY MASS INDEX (BMI) OF 37.0 TO 37.9 IN ADULT (MULTI): Status: ACTIVE | Noted: 2023-10-17

## 2023-10-17 PROBLEM — E66.812 CLASS 2 SEVERE OBESITY DUE TO EXCESS CALORIES WITH SERIOUS COMORBIDITY AND BODY MASS INDEX (BMI) OF 37.0 TO 37.9 IN ADULT: Status: ACTIVE | Noted: 2023-10-17

## 2023-10-17 PROBLEM — Z96.653 PRESENCE OF BOTH ARTIFICIAL KNEE JOINTS: Status: ACTIVE | Noted: 2019-11-25

## 2023-10-17 PROBLEM — M25.561 CHRONIC PAIN OF RIGHT KNEE: Status: ACTIVE | Noted: 2019-11-25

## 2023-10-17 PROBLEM — M54.16 LUMBAR RADICULOPATHY: Status: ACTIVE | Noted: 2019-11-25

## 2023-10-17 PROBLEM — M25.552 PAIN IN LEFT HIP: Status: ACTIVE | Noted: 2022-01-19

## 2023-10-19 ENCOUNTER — OFFICE VISIT (OUTPATIENT)
Dept: ORTHOPEDIC SURGERY | Facility: CLINIC | Age: 63
End: 2023-10-19
Payer: COMMERCIAL

## 2023-10-19 VITALS — HEIGHT: 76 IN | WEIGHT: 315 LBS | BODY MASS INDEX: 38.36 KG/M2

## 2023-10-19 DIAGNOSIS — G56.02 CARPAL TUNNEL SYNDROME OF LEFT WRIST: Primary | ICD-10-CM

## 2023-10-19 PROCEDURE — 3008F BODY MASS INDEX DOCD: CPT | Performed by: ORTHOPAEDIC SURGERY

## 2023-10-19 PROCEDURE — 99024 POSTOP FOLLOW-UP VISIT: CPT | Performed by: ORTHOPAEDIC SURGERY

## 2023-10-19 PROCEDURE — 1160F RVW MEDS BY RX/DR IN RCRD: CPT | Performed by: ORTHOPAEDIC SURGERY

## 2023-10-19 PROCEDURE — 1159F MED LIST DOCD IN RCRD: CPT | Performed by: ORTHOPAEDIC SURGERY

## 2023-10-19 PROCEDURE — 1126F AMNT PAIN NOTED NONE PRSNT: CPT | Performed by: ORTHOPAEDIC SURGERY

## 2023-10-19 ASSESSMENT — PAIN - FUNCTIONAL ASSESSMENT: PAIN_FUNCTIONAL_ASSESSMENT: NO/DENIES PAIN

## 2023-10-19 NOTE — PROGRESS NOTES
63 y.o. male presents today for for follow up after left ECTR. The patient reports doing well, no issues. The DOS was 2 weeks ago. Pain is controlled. Patient denies numbness and tingling.     Review of Systems    Constitutional: no fever, no chills, not feeling tired, no recent weight gain and no recent weight loss.   ENT: no nosebleeds.   Cardiovascular: no chest pain.   Respiratory: no shortness of breath and no cough.   Gastrointestinal: no abdominal pain, no nausea, no vomiting and no diarrhea.   Musculoskeletal: per HPI  Integumentary: no rashes and no skin wound.   Neurological: no headache.   Psychiatric: no depression and no sleep disturbances.   Endocrine: no muscle weakness and no muscle cramps.   Hematologic/Lymphatic: no swollen glands and no tendency for easy bruising.     All other systems have been reviewed and are negative for complaint    Patient's past medical history, past surgical history, allergies, and medications have been reviewed unless otherwise noted in the chart.     Post-Op Exam  Inspection:  no evidence of infection, no erythema, Palpation:  compartments are soft, Range of Motion:  full Stability:  stable Strength:  intact 5/5 Skin:  incision site clean, dry and intact, healing without complication, Vascular:  capillary refill <2 seconds distally, Sensation:  intact to light touch distally.    Constitutional   General appearance: Alert and in no acute distress. Well developed, well nourished.    Eyes   External Eye, Conjunctiva and lids: Normal external exam - pupils were equal in size, round, reactive to light (PERRL) with normal accommodation and extraocular movements intact (EOMI).   Ears, Nose, Mouth, and Throat   Hearing: Normal.   Neck   Neck: No neck mass was observed. Supple.   Pulmonary   Respiratory effort: No respiratory distress.   Cardiovascular   Examination of extremities: No peripheral edema.   Abdomen   Abdomen: Soft nontender; no abdominal mass palpated.. No rebound,  rigidity or guarding.    Skin   Skin and subcutaneous tissue: Normal skin color and pigmentation, normal skin turgor, and no rash.   Neurologic   Sensation: Normal.   Psychiatric   Judgment and insight: Intact.   Orientation to person, place, and time: Alert and oriented x 3.       Mood and affect: Normal.      2 weeks s/p left ECTR  I discussed the diagnosis and treatment options with the patient today along with their associated risks and benefits. After thorough discussion, the patient has elected to proceed with conservative management. All questions were answered to the patients satisfaction who seems satisfied with the plan.      Sutures removed  Steris  FU 4-6 weeks prn - no XR

## 2023-10-24 ENCOUNTER — OFFICE VISIT (OUTPATIENT)
Dept: VASCULAR SURGERY | Facility: CLINIC | Age: 63
End: 2023-10-24
Payer: COMMERCIAL

## 2023-10-24 VITALS
HEART RATE: 80 BPM | BODY MASS INDEX: 40.53 KG/M2 | WEIGHT: 315 LBS | DIASTOLIC BLOOD PRESSURE: 79 MMHG | SYSTOLIC BLOOD PRESSURE: 127 MMHG

## 2023-10-24 DIAGNOSIS — M79.89 LEG SWELLING: Primary | ICD-10-CM

## 2023-10-24 PROCEDURE — 1159F MED LIST DOCD IN RCRD: CPT | Performed by: SURGERY

## 2023-10-24 PROCEDURE — 1126F AMNT PAIN NOTED NONE PRSNT: CPT | Performed by: SURGERY

## 2023-10-24 PROCEDURE — 3008F BODY MASS INDEX DOCD: CPT | Performed by: SURGERY

## 2023-10-24 PROCEDURE — 99203 OFFICE O/P NEW LOW 30 MIN: CPT | Performed by: SURGERY

## 2023-10-24 PROCEDURE — 3078F DIAST BP <80 MM HG: CPT | Performed by: SURGERY

## 2023-10-24 PROCEDURE — 3074F SYST BP LT 130 MM HG: CPT | Performed by: SURGERY

## 2023-10-24 PROCEDURE — 1160F RVW MEDS BY RX/DR IN RCRD: CPT | Performed by: SURGERY

## 2023-10-24 ASSESSMENT — ENCOUNTER SYMPTOMS
CARDIOVASCULAR NEGATIVE: 1
ENDOCRINE NEGATIVE: 1
CONSTITUTIONAL NEGATIVE: 1
NEUROLOGICAL NEGATIVE: 1
GASTROINTESTINAL NEGATIVE: 1
MUSCULOSKELETAL NEGATIVE: 1
PSYCHIATRIC NEGATIVE: 1
RESPIRATORY NEGATIVE: 1
COLOR CHANGE: 1
ALLERGIC/IMMUNOLOGIC NEGATIVE: 1
EYES NEGATIVE: 1
HEMATOLOGIC/LYMPHATIC NEGATIVE: 1

## 2023-10-24 NOTE — PROGRESS NOTES
Subjective   Patient ID: Rambo Daigle is a 63 y.o. male who presents for New Patient Visit (Venous insufficiency ref from Dr Garcia).  HPI  63 year old male with a past medical history of afib, HTN, HLD, obesity, GERD, anemia, both knee replacement and hip replacement that presents to the office for initial consult for leg swelling. He states that his right lower leg does swell intermittently that waxes and wanes. However, is not bothersome. He denies leg pain or discomfort. He denies previous history of a DVT. He denies claudication. He has no issues moving around. 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 He hasn't tried compression stockings, but is willing to try. Upon exam, he does have a large varocity on his right thigh that radiates down past his knee. He does have some venous statsis on his right lower leg. He has no open sores or ulcers noted. He had an ultrasound that showed some venous reflux.    [facility]  [facility]: Reflux is noted in the saphenofemoral junction, proximal thigh great saphenous, mid thigh great saphenous, knee level of great saphenous, proximal calf great saphenous and mid calf great saphenous veins. There is a prominent  vein (4.8mm) noted in the distal calf with no reflux seen. There is a large, tortuous varicosity that arises off of the great saphenous vein in the mid thigh and travels througout the medial and posterior calf. The varicosity measures 9.7mm wide with 5.2 seconds of reflux. The small saphenous vein arises proximal to the popliteal vein (Giacomini variant). There is 2.7 seconds of reflux noted in the distal Vein of Giacomini.     Review of Systems   Constitutional: Negative.    HENT: Negative.     Eyes: Negative.    Respiratory: Negative.     Cardiovascular: Negative.    Gastrointestinal: Negative.    Endocrine: Negative.    Genitourinary: Negative.    Musculoskeletal: Negative.    Skin:   Positive for color change.   Allergic/Immunologic: Negative.    Neurological: Negative.    Hematological: Negative.    Psychiatric/Behavioral: Negative.         Objective   Physical Exam  Eyes:      Pupils: Pupils are equal, round, and reactive to light.   Cardiovascular:      Rate and Rhythm: Normal rate.   Pulmonary:      Effort: Pulmonary effort is normal.   Abdominal:      General: Bowel sounds are normal.   Musculoskeletal:         General: Swelling present. Normal range of motion.      Cervical back: Normal range of motion.   Skin:     Capillary Refill: Capillary refill takes less than 2 seconds.   Neurological:      General: No focal deficit present.      Mental Status: He is alert.   Psychiatric:         Mood and Affect: Mood normal.           Assessment/Plan   63 year old male with a past medical history of afib, HTN, HLD, obesity, GERD, anemia, both knee replacement and hip replacement that presents to the office for initial consult for leg swelling.    Plan:  Dicussed results of venous insufficiency ultrasound- does show superficial reflux. However, he remains asymptomatic.   Recommend conservative measures such as compression stockings 20-30 mmhg, exercise, rest and elevation  Recommend a 6 mth follow up. Pt states he will call office if his legs become bothersome or the swelling gets worse

## 2024-01-17 ENCOUNTER — TELEPHONE (OUTPATIENT)
Dept: PRIMARY CARE | Facility: CLINIC | Age: 64
End: 2024-01-17
Payer: MEDICARE

## 2024-01-17 DIAGNOSIS — I48.0 PAROXYSMAL ATRIAL FIBRILLATION (MULTI): ICD-10-CM

## 2024-01-19 ENCOUNTER — LAB (OUTPATIENT)
Dept: LAB | Facility: LAB | Age: 64
End: 2024-01-19
Payer: MEDICARE

## 2024-01-19 DIAGNOSIS — Z13.29 THYROID DISORDER SCREEN: ICD-10-CM

## 2024-01-19 DIAGNOSIS — E78.1 ESSENTIAL HYPERTRIGLYCERIDEMIA: ICD-10-CM

## 2024-01-19 DIAGNOSIS — I10 BENIGN ESSENTIAL HYPERTENSION: ICD-10-CM

## 2024-01-19 DIAGNOSIS — I48.0 PAROXYSMAL ATRIAL FIBRILLATION (MULTI): ICD-10-CM

## 2024-01-19 DIAGNOSIS — E55.9 VITAMIN D DEFICIENCY: ICD-10-CM

## 2024-01-19 LAB
25(OH)D3 SERPL-MCNC: 45 NG/ML (ref 30–100)
ALBUMIN SERPL BCP-MCNC: 4.3 G/DL (ref 3.4–5)
ALP SERPL-CCNC: 99 U/L (ref 33–136)
ALT SERPL W P-5'-P-CCNC: 25 U/L (ref 10–52)
ANION GAP SERPL CALC-SCNC: 14 MMOL/L (ref 10–20)
AST SERPL W P-5'-P-CCNC: 21 U/L (ref 9–39)
BILIRUB SERPL-MCNC: 0.6 MG/DL (ref 0–1.2)
BUN SERPL-MCNC: 24 MG/DL (ref 6–23)
CALCIUM SERPL-MCNC: 9.7 MG/DL (ref 8.6–10.6)
CHLORIDE SERPL-SCNC: 101 MMOL/L (ref 98–107)
CHOLEST SERPL-MCNC: 138 MG/DL (ref 0–199)
CHOLESTEROL/HDL RATIO: 2.4
CO2 SERPL-SCNC: 29 MMOL/L (ref 21–32)
CREAT SERPL-MCNC: 1 MG/DL (ref 0.5–1.3)
EGFRCR SERPLBLD CKD-EPI 2021: 84 ML/MIN/1.73M*2
ERYTHROCYTE [DISTWIDTH] IN BLOOD BY AUTOMATED COUNT: 12.9 % (ref 11.5–14.5)
GLUCOSE SERPL-MCNC: 98 MG/DL (ref 74–99)
HCT VFR BLD AUTO: 42.1 % (ref 41–52)
HDLC SERPL-MCNC: 57.6 MG/DL
HGB BLD-MCNC: 13.9 G/DL (ref 13.5–17.5)
LDLC SERPL CALC-MCNC: 58 MG/DL
MCH RBC QN AUTO: 31.7 PG (ref 26–34)
MCHC RBC AUTO-ENTMCNC: 33 G/DL (ref 32–36)
MCV RBC AUTO: 96 FL (ref 80–100)
NON HDL CHOLESTEROL: 80 MG/DL (ref 0–149)
NRBC BLD-RTO: 0 /100 WBCS (ref 0–0)
PLATELET # BLD AUTO: 305 X10*3/UL (ref 150–450)
POTASSIUM SERPL-SCNC: 4.5 MMOL/L (ref 3.5–5.3)
PROT SERPL-MCNC: 7.3 G/DL (ref 6.4–8.2)
RBC # BLD AUTO: 4.38 X10*6/UL (ref 4.5–5.9)
SODIUM SERPL-SCNC: 139 MMOL/L (ref 136–145)
TRIGL SERPL-MCNC: 113 MG/DL (ref 0–149)
TSH SERPL-ACNC: 2.29 MIU/L (ref 0.44–3.98)
VLDL: 23 MG/DL (ref 0–40)
WBC # BLD AUTO: 4.5 X10*3/UL (ref 4.4–11.3)

## 2024-01-19 PROCEDURE — 82306 VITAMIN D 25 HYDROXY: CPT

## 2024-01-19 PROCEDURE — 36415 COLL VENOUS BLD VENIPUNCTURE: CPT

## 2024-01-19 PROCEDURE — 84443 ASSAY THYROID STIM HORMONE: CPT

## 2024-01-19 PROCEDURE — 85027 COMPLETE CBC AUTOMATED: CPT

## 2024-01-19 PROCEDURE — 80053 COMPREHEN METABOLIC PANEL: CPT

## 2024-01-19 PROCEDURE — 80061 LIPID PANEL: CPT

## 2024-01-24 ENCOUNTER — OFFICE VISIT (OUTPATIENT)
Dept: PRIMARY CARE | Facility: CLINIC | Age: 64
End: 2024-01-24
Payer: MEDICARE

## 2024-01-24 VITALS
WEIGHT: 315 LBS | RESPIRATION RATE: 14 BRPM | DIASTOLIC BLOOD PRESSURE: 76 MMHG | OXYGEN SATURATION: 95 % | HEIGHT: 77 IN | BODY MASS INDEX: 37.19 KG/M2 | HEART RATE: 73 BPM | SYSTOLIC BLOOD PRESSURE: 119 MMHG | TEMPERATURE: 97.4 F

## 2024-01-24 DIAGNOSIS — Z12.5 SCREENING FOR PROSTATE CANCER: ICD-10-CM

## 2024-01-24 DIAGNOSIS — Z00.00 ANNUAL PHYSICAL EXAM: ICD-10-CM

## 2024-01-24 DIAGNOSIS — I50.32 CHRONIC HEART FAILURE WITH PRESERVED EJECTION FRACTION (MULTI): ICD-10-CM

## 2024-01-24 DIAGNOSIS — E66.01 MORBID OBESITY WITH BMI OF 40.0-44.9, ADULT (MULTI): ICD-10-CM

## 2024-01-24 DIAGNOSIS — I48.0 PAROXYSMAL ATRIAL FIBRILLATION (MULTI): ICD-10-CM

## 2024-01-24 DIAGNOSIS — E55.9 VITAMIN D DEFICIENCY: ICD-10-CM

## 2024-01-24 DIAGNOSIS — E78.49 OTHER HYPERLIPIDEMIA: ICD-10-CM

## 2024-01-24 DIAGNOSIS — I87.2 VENOUS INSUFFICIENCY OF RIGHT LOWER EXTREMITY: ICD-10-CM

## 2024-01-24 DIAGNOSIS — I10 BENIGN ESSENTIAL HYPERTENSION: ICD-10-CM

## 2024-01-24 DIAGNOSIS — Z00.00 MEDICARE ANNUAL WELLNESS VISIT, INITIAL: Primary | ICD-10-CM

## 2024-01-24 DIAGNOSIS — E78.1 ESSENTIAL HYPERTRIGLYCERIDEMIA: ICD-10-CM

## 2024-01-24 DIAGNOSIS — J43.8 OTHER EMPHYSEMA (MULTI): ICD-10-CM

## 2024-01-24 PROBLEM — T14.8XXA SKIN ABRASION: Status: RESOLVED | Noted: 2023-09-12 | Resolved: 2024-01-24

## 2024-01-24 PROBLEM — E66.812 CLASS 2 SEVERE OBESITY DUE TO EXCESS CALORIES WITH SERIOUS COMORBIDITY AND BODY MASS INDEX (BMI) OF 37.0 TO 37.9 IN ADULT: Status: RESOLVED | Noted: 2023-10-17 | Resolved: 2024-01-24

## 2024-01-24 PROCEDURE — 1170F FXNL STATUS ASSESSED: CPT | Performed by: FAMILY MEDICINE

## 2024-01-24 PROCEDURE — 3008F BODY MASS INDEX DOCD: CPT | Performed by: FAMILY MEDICINE

## 2024-01-24 PROCEDURE — 1160F RVW MEDS BY RX/DR IN RCRD: CPT | Performed by: FAMILY MEDICINE

## 2024-01-24 PROCEDURE — G0438 PPPS, INITIAL VISIT: HCPCS | Performed by: FAMILY MEDICINE

## 2024-01-24 PROCEDURE — 3074F SYST BP LT 130 MM HG: CPT | Performed by: FAMILY MEDICINE

## 2024-01-24 PROCEDURE — 1126F AMNT PAIN NOTED NONE PRSNT: CPT | Performed by: FAMILY MEDICINE

## 2024-01-24 PROCEDURE — 1123F ACP DISCUSS/DSCN MKR DOCD: CPT | Performed by: FAMILY MEDICINE

## 2024-01-24 PROCEDURE — 1159F MED LIST DOCD IN RCRD: CPT | Performed by: FAMILY MEDICINE

## 2024-01-24 PROCEDURE — 3078F DIAST BP <80 MM HG: CPT | Performed by: FAMILY MEDICINE

## 2024-01-24 PROCEDURE — 99214 OFFICE O/P EST MOD 30 MIN: CPT | Performed by: FAMILY MEDICINE

## 2024-01-24 PROCEDURE — 99397 PER PM REEVAL EST PAT 65+ YR: CPT | Performed by: FAMILY MEDICINE

## 2024-01-24 PROCEDURE — 1036F TOBACCO NON-USER: CPT | Performed by: FAMILY MEDICINE

## 2024-01-24 RX ORDER — SILDENAFIL 100 MG/1
TABLET, FILM COATED ORAL
COMMUNITY
Start: 2020-08-26 | End: 2024-03-26 | Stop reason: WASHOUT

## 2024-01-24 ASSESSMENT — ENCOUNTER SYMPTOMS
CHEST TIGHTNESS: 0
ARTHRALGIAS: 0
FEVER: 0
CONFUSION: 0
SHORTNESS OF BREATH: 0
ABDOMINAL PAIN: 0
PALPITATIONS: 0
CHILLS: 0

## 2024-01-24 ASSESSMENT — ACTIVITIES OF DAILY LIVING (ADL)
DRESSING: INDEPENDENT
TAKING_MEDICATION: INDEPENDENT
GROCERY_SHOPPING: INDEPENDENT
DOING_HOUSEWORK: INDEPENDENT
BATHING: INDEPENDENT
MANAGING_FINANCES: INDEPENDENT

## 2024-01-24 ASSESSMENT — PATIENT HEALTH QUESTIONNAIRE - PHQ9
2. FEELING DOWN, DEPRESSED OR HOPELESS: NOT AT ALL
SUM OF ALL RESPONSES TO PHQ9 QUESTIONS 1 AND 2: 0
1. LITTLE INTEREST OR PLEASURE IN DOING THINGS: NOT AT ALL

## 2024-01-24 NOTE — ASSESSMENT & PLAN NOTE
Reviewed diet and weight loss strategies patient has gained approximately 16 pounds over the past year.

## 2024-01-24 NOTE — ASSESSMENT & PLAN NOTE
Clinically stable continue Xarelto.  We discussed that extending me to his his cardiologist if he is interested he can talk to them about whether or not undergoing a second cardioversion and/or an ablation procedure or options if he should choose to proceed that way.  Also regarding Xarelto now that he is on Medicare it is more expensive we discussed Watchman device and told him to discuss this further with his cardiologist to see what his thoughts are whether or not he would be a candidate.

## 2024-01-24 NOTE — PROGRESS NOTES
"Subjective   Reason for Visit: Rambo Daigle is an 63 y.o. male here for a Medicare Wellness visit.     Past Medical, Surgical, and Family History reviewed and updated in chart.    Reviewed all medications by prescribing practitioner or clinical pharmacist (such as prescriptions, OTCs, herbal therapies and supplements) and documented in the medical record.    HPI  Patient today for follow-up of ongoing healthcare issues and review of lab work.  For the most part states has been doing well.  He admits that he is been struggling with his weight and he is really not following a proper diet.  He says he plans on retiring in March of this year hoping that we will open up his schedule more to maybe start doing some formal exercises.  In the meantime he says he is going to try dietary modification.  Patient Care Team:  Dexter Torres MD as PCP - General  Dexter Torres MD as PCP - [facility]MO [facility] PCP     Review of Systems   Constitutional:  Negative for chills and fever.   HENT:  Negative for congestion and ear pain.    Eyes:  Negative for visual disturbance.   Respiratory:  Negative for chest tightness and shortness of breath.    Cardiovascular:  Negative for chest pain and palpitations.   Gastrointestinal:  Negative for abdominal pain.   Musculoskeletal:  Negative for arthralgias.   Skin:  Negative for pallor.   Psychiatric/Behavioral:  Negative for confusion.        Objective   Vitals:  /76   Pulse 73   Temp 36.3 °C (97.4 °F)   Resp 14   Ht 1.956 m (6' 5\")   Wt (!) 153 kg (338 lb)   SpO2 95%   BMI 40.08 kg/m²       Physical Exam  Vitals and nursing note reviewed.   Constitutional:       General: He is not in acute distress.     Appearance: Normal appearance. He is not ill-appearing.   HENT:      Head: Normocephalic and atraumatic.      Right Ear: Tympanic membrane, ear canal and external ear normal.      Left Ear: Tympanic membrane, ear canal and external ear normal.      Mouth/Throat:      Pharynx: Oropharynx " is clear.   Eyes:      Extraocular Movements: Extraocular movements intact.   Cardiovascular:      Rate and Rhythm: Normal rate. Rhythm irregular.      Pulses: Normal pulses.      Heart sounds: Normal heart sounds.   Pulmonary:      Effort: Pulmonary effort is normal.      Breath sounds: Normal breath sounds.   Abdominal:      General: Abdomen is flat. Bowel sounds are normal.      Palpations: Abdomen is soft.      Tenderness: There is no abdominal tenderness.   Musculoskeletal:         General: Normal range of motion.      Cervical back: Neck supple.   Skin:     General: Skin is warm.   Neurological:      Mental Status: He is alert and oriented to person, place, and time. Mental status is at baseline.   Psychiatric:         Mood and Affect: Mood normal.       Recent Results (from the past 1008 hour(s))   CBC    Collection Time: 01/19/24  7:24 AM   Result Value Ref Range    WBC 4.5 4.4 - 11.3 x10*3/uL    nRBC 0.0 0.0 - 0.0 /100 WBCs    RBC 4.38 (L) 4.50 - 5.90 x10*6/uL    Hemoglobin 13.9 13.5 - 17.5 g/dL    Hematocrit 42.1 41.0 - 52.0 %    MCV 96 80 - 100 fL    MCH 31.7 26.0 - 34.0 pg    MCHC 33.0 32.0 - 36.0 g/dL    RDW 12.9 11.5 - 14.5 %    Platelets 305 150 - 450 x10*3/uL   Comprehensive Metabolic Panel    Collection Time: 01/19/24  7:24 AM   Result Value Ref Range    Glucose 98 74 - 99 mg/dL    Sodium 139 136 - 145 mmol/L    Potassium 4.5 3.5 - 5.3 mmol/L    Chloride 101 98 - 107 mmol/L    Bicarbonate 29 21 - 32 mmol/L    Anion Gap 14 10 - 20 mmol/L    Urea Nitrogen 24 (H) 6 - 23 mg/dL    Creatinine 1.00 0.50 - 1.30 mg/dL    eGFR 84 >60 mL/min/1.73m*2    Calcium 9.7 8.6 - 10.6 mg/dL    Albumin 4.3 3.4 - 5.0 g/dL    Alkaline Phosphatase 99 33 - 136 U/L    Total Protein 7.3 6.4 - 8.2 g/dL    AST 21 9 - 39 U/L    Bilirubin, Total 0.6 0.0 - 1.2 mg/dL    ALT 25 10 - 52 U/L   Lipid Panel    Collection Time: 01/19/24  7:24 AM   Result Value Ref Range    Cholesterol 138 0 - 199 mg/dL    HDL-Cholesterol 57.6 mg/dL     Cholesterol/HDL Ratio 2.4     LDL Calculated 58 <=99 mg/dL    VLDL 23 0 - 40 mg/dL    Triglycerides 113 0 - 149 mg/dL    Non HDL Cholesterol 80 0 - 149 mg/dL   Vitamin D, Total    Collection Time: 01/19/24  7:24 AM   Result Value Ref Range    Vitamin D, 25-Hydroxy, Total 45 30 - 100 ng/mL   TSH with reflex to Free T4 if abnormal    Collection Time: 01/19/24  7:24 AM   Result Value Ref Range    Thyroid Stimulating Hormone 2.29 0.44 - 3.98 mIU/L     Recent labs reviewed with patient current medication  Follow low-fat low-cholesterol diet decrease overall caloric intake and work on weight loss strategies    Continue to follow with cardiology    If he changes his mind about vaccines he is to let us know.  Return to our office 6 months with repeat fasting labs        Assessment/Plan   Problem List Items Addressed This Visit       Atrial fibrillation (CMS/[facility])    Current Assessment & Plan     Clinically stable continue Xarelto.  We discussed that extending me to his his cardiologist if he is interested he can talk to them about whether or not undergoing a second cardioversion and/or an ablation procedure or options if he should choose to proceed that way.  Also regarding Xarelto now that he is on Medicare it is more expensive we discussed Watchman device and told him to discuss this further with his cardiologist to see what his thoughts are whether or not he would be a candidate.         Relevant Medications    sildenafil (Viagra) 100 mg tablet    Other Relevant Orders    CBC    Comprehensive Metabolic Panel    Follow Up In Primary Care - Established    Benign essential hypertension    Current Assessment & Plan     Stable continue current medication         Relevant Orders    Comprehensive Metabolic Panel    Follow Up In Primary Care - Established    Essential hypertriglyceridemia    Current Assessment & Plan     Work on dietary modifications         Relevant Orders    Comprehensive Metabolic Panel    Lipid Panel     Follow Up In Primary Care - Established    Vitamin D deficiency    Current Assessment & Plan     Continue to monitor supplement as needed         Relevant Orders    Vitamin D 25-Hydroxy,Total (for eval of Vitamin D levels)    Venous insufficiency of right lower extremity    Current Assessment & Plan     Vascular surgery evaluation in the fall noted they recommended knee-high compression stockings         Annual physical exam    Current Assessment & Plan     Recent labs reviewed with patient    We discussed diet and weight loss strategies.  Reviewed weights over the past year he has gained about 16 pounds.  We discussed dietary program.  And starting gradual exercise program if okay with his cardiologist.         Screening for prostate cancer    Current Assessment & Plan     Screening PSA with next lab draw         Relevant Orders    Prostate Specific Antigen, Screen    Medicare annual wellness visit, initial - Primary    Current Assessment & Plan     Reviewed recommendations with regards to immunizations including pneumococcal vaccine flu vaccine Shingrix RSV he states he will consider but currently refuses all vaccines.    Colonoscopy is up-to-date.         Morbid obesity with BMI of 40.0-44.9, adult (CMS/[facility])    Current Assessment & Plan     Reviewed diet and weight loss strategies patient has gained approximately 16 pounds over the past year.         Chronic heart failure with preserved ejection fraction (CMS/[facility])    Current Assessment & Plan     Stable continue current medication and follow with his cardiologist at [facility]         Relevant Medications    sildenafil (Viagra) 100 mg tablet    Other emphysema (CMS/[facility])    Current Assessment & Plan     Patient no longer smoking.  Feels his breathing is adequate for his level of activity.          Other Visit Diagnoses       Other hyperlipidemia        Relevant Orders    Cholesterol, LDL Direct

## 2024-01-24 NOTE — ASSESSMENT & PLAN NOTE
Reviewed recommendations with regards to immunizations including pneumococcal vaccine flu vaccine Shingrix RSV he states he will consider but currently refuses all vaccines.    Colonoscopy is up-to-date.

## 2024-01-24 NOTE — ASSESSMENT & PLAN NOTE
Recent labs reviewed with patient    We discussed diet and weight loss strategies.  Reviewed weights over the past year he has gained about 16 pounds.  We discussed dietary program.  And starting gradual exercise program if okay with his cardiologist.

## 2024-03-26 ENCOUNTER — OFFICE VISIT (OUTPATIENT)
Dept: PRIMARY CARE | Facility: CLINIC | Age: 64
End: 2024-03-26
Payer: MEDICARE

## 2024-03-26 VITALS
RESPIRATION RATE: 16 BRPM | SYSTOLIC BLOOD PRESSURE: 122 MMHG | WEIGHT: 315 LBS | DIASTOLIC BLOOD PRESSURE: 81 MMHG | OXYGEN SATURATION: 96 % | HEIGHT: 77 IN | HEART RATE: 73 BPM | BODY MASS INDEX: 37.19 KG/M2 | TEMPERATURE: 97.8 F

## 2024-03-26 DIAGNOSIS — I10 BENIGN ESSENTIAL HYPERTENSION: ICD-10-CM

## 2024-03-26 DIAGNOSIS — T14.8XXD DELAYED WOUND HEALING: Primary | ICD-10-CM

## 2024-03-26 PROCEDURE — 99213 OFFICE O/P EST LOW 20 MIN: CPT | Performed by: FAMILY MEDICINE

## 2024-03-26 PROCEDURE — 1160F RVW MEDS BY RX/DR IN RCRD: CPT | Performed by: FAMILY MEDICINE

## 2024-03-26 PROCEDURE — 1123F ACP DISCUSS/DSCN MKR DOCD: CPT | Performed by: FAMILY MEDICINE

## 2024-03-26 PROCEDURE — 3074F SYST BP LT 130 MM HG: CPT | Performed by: FAMILY MEDICINE

## 2024-03-26 PROCEDURE — 1036F TOBACCO NON-USER: CPT | Performed by: FAMILY MEDICINE

## 2024-03-26 PROCEDURE — 1159F MED LIST DOCD IN RCRD: CPT | Performed by: FAMILY MEDICINE

## 2024-03-26 PROCEDURE — 3079F DIAST BP 80-89 MM HG: CPT | Performed by: FAMILY MEDICINE

## 2024-03-26 PROCEDURE — 3008F BODY MASS INDEX DOCD: CPT | Performed by: FAMILY MEDICINE

## 2024-03-26 PROCEDURE — 1158F ADVNC CARE PLAN TLK DOCD: CPT | Performed by: FAMILY MEDICINE

## 2024-03-26 ASSESSMENT — ENCOUNTER SYMPTOMS
CONSTITUTIONAL NEGATIVE: 1
CARDIOVASCULAR NEGATIVE: 1
RESPIRATORY NEGATIVE: 1
WOUND: 1

## 2024-03-26 NOTE — PROGRESS NOTES
"Subjective   Patient ID: Rambo Daigle is a 64 y.o. male who presents for Follow-up (Right ankle pain issue).    HPI patient today complaining of pain right lateral ankle he says he believes he suffered a scrape/cut to this area about a month ago is not sure exactly what caused it he said he might of brushed up against a stick in the yard.  He has been using over-the-counter antibiotic ointment and it is getting better but he still has some discomfort in the area.  He is able to ambulate without any difficulties.    Review of Systems   Constitutional: Negative.    Respiratory: Negative.     Cardiovascular: Negative.    Skin:  Positive for wound.       Objective   /81 (BP Location: Left arm, Patient Position: Sitting, BP Cuff Size: Large adult)   Pulse 73   Temp 36.6 °C (97.8 °F) (Temporal)   Resp 16   Ht 1.956 m (6' 5\")   Wt (!) 156 kg (343 lb 3.2 oz)   SpO2 96%   BMI 40.70 kg/m²     Physical Exam  Constitutional:       General: He is not in acute distress.     Appearance: Normal appearance. He is not ill-appearing.   Cardiovascular:      Rate and Rhythm: Normal rate.      Heart sounds: Normal heart sounds.   Pulmonary:      Effort: Pulmonary effort is normal. No respiratory distress.      Breath sounds: Normal breath sounds.   Skin:     Comments: Right lateral ankle reveals a small area of dry slightly cracked skin where the apparent wound was originally.  There is no signs of infection.  No significant tenderness palpation.     Recommend stopping the antibiotic ointment switch over to moisturizing cream such as Cetaphil or CeraVe a use it at least twice a day anticipate improvement and resolution over the next few weeks call if anything worsens or return in 2 to 3 weeks if no better otherwise keep regular follow-up appointment this summer    Assessment/Plan   Problem List Items Addressed This Visit             ICD-10-CM    Benign essential hypertension I10     Stable continue current treatment         " Delayed wound healing - Primary T14.8XXD     No signs of infection still some lingering dry skin from the previous cut/scrape.  Switch over to moisturizing cream such as Cetaphil or survey applied twice a day call if anything worsens return in 2 to 3 weeks if no better

## 2024-03-26 NOTE — ASSESSMENT & PLAN NOTE
No signs of infection still some lingering dry skin from the previous cut/scrape.  Switch over to moisturizing cream such as Cetaphil or survey applied twice a day call if anything worsens return in 2 to 3 weeks if no better

## 2024-04-04 NOTE — TELEPHONE ENCOUNTER
Rx Refill Request Telephone Encounter    Name:  Rambo Daigle  :  [id number]  Medication Name:      rivaroxaban (Xarelto) 20 mg tablet   Route: Take 1 tablet (20 mg) by mouth once daily.      Specific Pharmacy location:  [id number] [address]   Date of last appointment:  23  Date of next appointment: 24   Best number to reach patient: [phone]              Improved.

## 2024-04-23 ENCOUNTER — APPOINTMENT (OUTPATIENT)
Dept: RADIOLOGY | Facility: HOSPITAL | Age: 64
DRG: 908 | End: 2024-04-23
Payer: MEDICARE

## 2024-04-23 ENCOUNTER — HOSPITAL ENCOUNTER (INPATIENT)
Facility: HOSPITAL | Age: 64
LOS: 11 days | Discharge: HOME | DRG: 908 | End: 2024-05-04
Attending: EMERGENCY MEDICINE | Admitting: SURGERY
Payer: MEDICARE

## 2024-04-23 DIAGNOSIS — G47.33 OSA (OBSTRUCTIVE SLEEP APNEA): ICD-10-CM

## 2024-04-23 DIAGNOSIS — T85.79XA INFECTED HERNIOPLASTY MESH, INITIAL ENCOUNTER (CMS-HCC): Primary | ICD-10-CM

## 2024-04-23 DIAGNOSIS — K56.7 ILEUS (MULTI): ICD-10-CM

## 2024-04-23 DIAGNOSIS — I10 BENIGN ESSENTIAL HYPERTENSION: ICD-10-CM

## 2024-04-23 DIAGNOSIS — I50.32 CHRONIC HEART FAILURE WITH PRESERVED EJECTION FRACTION (MULTI): ICD-10-CM

## 2024-04-23 DIAGNOSIS — I48.21 PERMANENT ATRIAL FIBRILLATION (MULTI): ICD-10-CM

## 2024-04-23 DIAGNOSIS — E66.01 MORBID OBESITY WITH BMI OF 40.0-44.9, ADULT (MULTI): ICD-10-CM

## 2024-04-23 LAB
ANION GAP SERPL CALC-SCNC: 11 MMOL/L (ref 10–20)
BASOPHILS # BLD AUTO: 0.06 X10*3/UL (ref 0–0.1)
BASOPHILS NFR BLD AUTO: 0.8 %
BUN SERPL-MCNC: 17 MG/DL (ref 6–23)
CALCIUM SERPL-MCNC: 9.5 MG/DL (ref 8.6–10.3)
CHLORIDE SERPL-SCNC: 101 MMOL/L (ref 98–107)
CO2 SERPL-SCNC: 30 MMOL/L (ref 21–32)
CREAT SERPL-MCNC: 1.05 MG/DL (ref 0.5–1.3)
EGFRCR SERPLBLD CKD-EPI 2021: 78 ML/MIN/1.73M*2
EOSINOPHIL # BLD AUTO: 0.11 X10*3/UL (ref 0–0.7)
EOSINOPHIL NFR BLD AUTO: 1.5 %
ERYTHROCYTE [DISTWIDTH] IN BLOOD BY AUTOMATED COUNT: 13.1 % (ref 11.5–14.5)
ERYTHROCYTE [DISTWIDTH] IN BLOOD BY AUTOMATED COUNT: 13.2 % (ref 11.5–14.5)
GLUCOSE SERPL-MCNC: 97 MG/DL (ref 74–99)
HCT VFR BLD AUTO: 39.5 % (ref 41–52)
HCT VFR BLD AUTO: 44.1 % (ref 41–52)
HGB BLD-MCNC: 13.4 G/DL (ref 13.5–17.5)
HGB BLD-MCNC: 14.4 G/DL (ref 13.5–17.5)
IMM GRANULOCYTES # BLD AUTO: 0.04 X10*3/UL (ref 0–0.7)
IMM GRANULOCYTES NFR BLD AUTO: 0.5 % (ref 0–0.9)
LACTATE SERPL-SCNC: 0.8 MMOL/L (ref 0.4–2)
LYMPHOCYTES # BLD AUTO: 1.35 X10*3/UL (ref 1.2–4.8)
LYMPHOCYTES NFR BLD AUTO: 18.5 %
MAGNESIUM SERPL-MCNC: 1.93 MG/DL (ref 1.6–2.4)
MCH RBC QN AUTO: 31.1 PG (ref 26–34)
MCH RBC QN AUTO: 31.8 PG (ref 26–34)
MCHC RBC AUTO-ENTMCNC: 32.7 G/DL (ref 32–36)
MCHC RBC AUTO-ENTMCNC: 33.9 G/DL (ref 32–36)
MCV RBC AUTO: 94 FL (ref 80–100)
MCV RBC AUTO: 95 FL (ref 80–100)
MONOCYTES # BLD AUTO: 0.65 X10*3/UL (ref 0.1–1)
MONOCYTES NFR BLD AUTO: 8.9 %
NEUTROPHILS # BLD AUTO: 5.09 X10*3/UL (ref 1.2–7.7)
NEUTROPHILS NFR BLD AUTO: 69.8 %
NRBC BLD-RTO: 0 /100 WBCS (ref 0–0)
NRBC BLD-RTO: 0 /100 WBCS (ref 0–0)
PLATELET # BLD AUTO: 274 X10*3/UL (ref 150–450)
PLATELET # BLD AUTO: 318 X10*3/UL (ref 150–450)
POTASSIUM SERPL-SCNC: 4.6 MMOL/L (ref 3.5–5.3)
RBC # BLD AUTO: 4.22 X10*6/UL (ref 4.5–5.9)
RBC # BLD AUTO: 4.63 X10*6/UL (ref 4.5–5.9)
SODIUM SERPL-SCNC: 137 MMOL/L (ref 136–145)
WBC # BLD AUTO: 7.3 X10*3/UL (ref 4.4–11.3)
WBC # BLD AUTO: 7.6 X10*3/UL (ref 4.4–11.3)

## 2024-04-23 PROCEDURE — 2550000001 HC RX 255 CONTRASTS: Performed by: NURSE PRACTITIONER

## 2024-04-23 PROCEDURE — 96375 TX/PRO/DX INJ NEW DRUG ADDON: CPT

## 2024-04-23 PROCEDURE — 2500000004 HC RX 250 GENERAL PHARMACY W/ HCPCS (ALT 636 FOR OP/ED): Performed by: SURGERY

## 2024-04-23 PROCEDURE — 74177 CT ABD & PELVIS W/CONTRAST: CPT | Performed by: RADIOLOGY

## 2024-04-23 PROCEDURE — 36415 COLL VENOUS BLD VENIPUNCTURE: CPT | Performed by: SURGERY

## 2024-04-23 PROCEDURE — 83735 ASSAY OF MAGNESIUM: CPT | Performed by: SURGERY

## 2024-04-23 PROCEDURE — 99285 EMERGENCY DEPT VISIT HI MDM: CPT | Mod: 25

## 2024-04-23 PROCEDURE — 85027 COMPLETE CBC AUTOMATED: CPT | Performed by: SURGERY

## 2024-04-23 PROCEDURE — 36415 COLL VENOUS BLD VENIPUNCTURE: CPT | Performed by: NURSE PRACTITIONER

## 2024-04-23 PROCEDURE — 82374 ASSAY BLOOD CARBON DIOXIDE: CPT | Performed by: NURSE PRACTITIONER

## 2024-04-23 PROCEDURE — 96366 THER/PROPH/DIAG IV INF ADDON: CPT

## 2024-04-23 PROCEDURE — 96367 TX/PROPH/DG ADDL SEQ IV INF: CPT

## 2024-04-23 PROCEDURE — 96365 THER/PROPH/DIAG IV INF INIT: CPT

## 2024-04-23 PROCEDURE — 2500000004 HC RX 250 GENERAL PHARMACY W/ HCPCS (ALT 636 FOR OP/ED): Performed by: NURSE PRACTITIONER

## 2024-04-23 PROCEDURE — 74177 CT ABD & PELVIS W/CONTRAST: CPT

## 2024-04-23 PROCEDURE — 83605 ASSAY OF LACTIC ACID: CPT | Performed by: NURSE PRACTITIONER

## 2024-04-23 PROCEDURE — 1100000001 HC PRIVATE ROOM DAILY

## 2024-04-23 PROCEDURE — 85025 COMPLETE CBC W/AUTO DIFF WBC: CPT | Performed by: NURSE PRACTITIONER

## 2024-04-23 RX ORDER — PANTOPRAZOLE SODIUM 40 MG/10ML
40 INJECTION, POWDER, LYOPHILIZED, FOR SOLUTION INTRAVENOUS
Status: DISCONTINUED | OUTPATIENT
Start: 2024-04-24 | End: 2024-05-04 | Stop reason: HOSPADM

## 2024-04-23 RX ORDER — FUROSEMIDE 20 MG/1
20 TABLET ORAL DAILY
Status: DISCONTINUED | OUTPATIENT
Start: 2024-04-23 | End: 2024-05-04 | Stop reason: HOSPADM

## 2024-04-23 RX ORDER — KETOROLAC TROMETHAMINE 30 MG/ML
15 INJECTION, SOLUTION INTRAMUSCULAR; INTRAVENOUS ONCE
Status: COMPLETED | OUTPATIENT
Start: 2024-04-23 | End: 2024-04-23

## 2024-04-23 RX ORDER — VANCOMYCIN HYDROCHLORIDE 1 G/200ML
1000 INJECTION, SOLUTION INTRAVENOUS EVERY 24 HOURS
Status: DISCONTINUED | OUTPATIENT
Start: 2024-04-24 | End: 2024-04-24

## 2024-04-23 RX ORDER — PROMETHAZINE HYDROCHLORIDE 25 MG/1
25 SUPPOSITORY RECTAL EVERY 12 HOURS PRN
Status: DISCONTINUED | OUTPATIENT
Start: 2024-04-23 | End: 2024-04-25

## 2024-04-23 RX ORDER — ACETAMINOPHEN 160 MG/5ML
650 SUSPENSION ORAL EVERY 4 HOURS PRN
Status: DISCONTINUED | OUTPATIENT
Start: 2024-04-23 | End: 2024-05-02

## 2024-04-23 RX ORDER — BISACODYL 5 MG
10 TABLET, DELAYED RELEASE (ENTERIC COATED) ORAL DAILY PRN
Status: DISCONTINUED | OUTPATIENT
Start: 2024-04-23 | End: 2024-04-25

## 2024-04-23 RX ORDER — ENOXAPARIN SODIUM 100 MG/ML
40 INJECTION SUBCUTANEOUS EVERY 12 HOURS SCHEDULED
Status: DISCONTINUED | OUTPATIENT
Start: 2024-04-23 | End: 2024-04-25

## 2024-04-23 RX ORDER — VANCOMYCIN HYDROCHLORIDE 1 G/20ML
INJECTION, POWDER, LYOPHILIZED, FOR SOLUTION INTRAVENOUS DAILY PRN
Status: DISCONTINUED | OUTPATIENT
Start: 2024-04-23 | End: 2024-04-25

## 2024-04-23 RX ORDER — ONDANSETRON 4 MG/1
4 TABLET, FILM COATED ORAL EVERY 8 HOURS PRN
Status: DISCONTINUED | OUTPATIENT
Start: 2024-04-23 | End: 2024-05-04 | Stop reason: HOSPADM

## 2024-04-23 RX ORDER — ACETAMINOPHEN 325 MG/1
650 TABLET ORAL EVERY 4 HOURS PRN
Status: DISCONTINUED | OUTPATIENT
Start: 2024-04-23 | End: 2024-05-02

## 2024-04-23 RX ORDER — DILTIAZEM HYDROCHLORIDE 120 MG/1
120 CAPSULE, COATED, EXTENDED RELEASE ORAL DAILY
Status: DISCONTINUED | OUTPATIENT
Start: 2024-04-23 | End: 2024-05-04 | Stop reason: HOSPADM

## 2024-04-23 RX ORDER — HYDROCHLOROTHIAZIDE 25 MG/1
25 TABLET ORAL DAILY
Status: DISCONTINUED | OUTPATIENT
Start: 2024-04-23 | End: 2024-04-25

## 2024-04-23 RX ORDER — POLYETHYLENE GLYCOL 3350 17 G/17G
17 POWDER, FOR SOLUTION ORAL DAILY
Status: DISCONTINUED | OUTPATIENT
Start: 2024-04-23 | End: 2024-04-25

## 2024-04-23 RX ORDER — ONDANSETRON HYDROCHLORIDE 2 MG/ML
4 INJECTION, SOLUTION INTRAVENOUS EVERY 8 HOURS PRN
Status: DISCONTINUED | OUTPATIENT
Start: 2024-04-23 | End: 2024-05-04 | Stop reason: HOSPADM

## 2024-04-23 RX ORDER — ACETAMINOPHEN 650 MG/1
650 SUPPOSITORY RECTAL EVERY 4 HOURS PRN
Status: DISCONTINUED | OUTPATIENT
Start: 2024-04-23 | End: 2024-05-02

## 2024-04-23 RX ORDER — PANTOPRAZOLE SODIUM 40 MG/1
40 TABLET, DELAYED RELEASE ORAL
Status: DISCONTINUED | OUTPATIENT
Start: 2024-04-24 | End: 2024-05-04 | Stop reason: HOSPADM

## 2024-04-23 RX ORDER — PROMETHAZINE HYDROCHLORIDE 25 MG/1
25 TABLET ORAL EVERY 6 HOURS PRN
Status: DISCONTINUED | OUTPATIENT
Start: 2024-04-23 | End: 2024-04-25

## 2024-04-23 RX ORDER — VANCOMYCIN HYDROCHLORIDE 1 G/20ML
INJECTION, POWDER, LYOPHILIZED, FOR SOLUTION INTRAVENOUS DAILY PRN
Status: DISCONTINUED | OUTPATIENT
Start: 2024-04-23 | End: 2024-04-23

## 2024-04-23 RX ORDER — DEXTROSE, SODIUM CHLORIDE, SODIUM LACTATE, POTASSIUM CHLORIDE, AND CALCIUM CHLORIDE 5; .6; .31; .03; .02 G/100ML; G/100ML; G/100ML; G/100ML; G/100ML
50 INJECTION, SOLUTION INTRAVENOUS CONTINUOUS
Status: DISCONTINUED | OUTPATIENT
Start: 2024-04-23 | End: 2024-05-02

## 2024-04-23 RX ADMIN — PIPERACILLIN SODIUM AND TAZOBACTAM SODIUM 3.38 G: 3; .375 INJECTION, SOLUTION INTRAVENOUS at 12:39

## 2024-04-23 RX ADMIN — VANCOMYCIN HYDROCHLORIDE 2000 MG: 10 INJECTION, POWDER, LYOPHILIZED, FOR SOLUTION INTRAVENOUS at 13:34

## 2024-04-23 RX ADMIN — SODIUM CHLORIDE, SODIUM LACTATE, POTASSIUM CHLORIDE, CALCIUM CHLORIDE AND DEXTROSE MONOHYDRATE 100 ML/HR: 5; 600; 310; 30; 20 INJECTION, SOLUTION INTRAVENOUS at 21:58

## 2024-04-23 RX ADMIN — ENOXAPARIN SODIUM 40 MG: 40 INJECTION SUBCUTANEOUS at 21:57

## 2024-04-23 RX ADMIN — IOHEXOL 100 ML: 350 INJECTION, SOLUTION INTRAVENOUS at 14:03

## 2024-04-23 RX ADMIN — PIPERACILLIN SODIUM AND TAZOBACTAM SODIUM 3.38 G: 3; .375 INJECTION, SOLUTION INTRAVENOUS at 18:48

## 2024-04-23 RX ADMIN — SODIUM CHLORIDE 1000 ML: 9 INJECTION, SOLUTION INTRAVENOUS at 12:40

## 2024-04-23 RX ADMIN — KETOROLAC TROMETHAMINE 15 MG: 30 INJECTION, SOLUTION INTRAMUSCULAR at 12:39

## 2024-04-23 SDOH — SOCIAL STABILITY: SOCIAL INSECURITY: DO YOU FEEL ANYONE HAS EXPLOITED OR TAKEN ADVANTAGE OF YOU FINANCIALLY OR OF YOUR PERSONAL PROPERTY?: NO

## 2024-04-23 SDOH — SOCIAL STABILITY: SOCIAL INSECURITY: DO YOU FEEL UNSAFE GOING BACK TO THE PLACE WHERE YOU ARE LIVING?: NO

## 2024-04-23 SDOH — SOCIAL STABILITY: SOCIAL INSECURITY: HAS ANYONE EVER THREATENED TO HURT YOUR FAMILY OR YOUR PETS?: NO

## 2024-04-23 SDOH — SOCIAL STABILITY: SOCIAL INSECURITY: ARE THERE ANY APPARENT SIGNS OF INJURIES/BEHAVIORS THAT COULD BE RELATED TO ABUSE/NEGLECT?: NO

## 2024-04-23 SDOH — SOCIAL STABILITY: SOCIAL INSECURITY: HAVE YOU HAD THOUGHTS OF HARMING ANYONE ELSE?: NO

## 2024-04-23 SDOH — SOCIAL STABILITY: SOCIAL INSECURITY: DOES ANYONE TRY TO KEEP YOU FROM HAVING/CONTACTING OTHER FRIENDS OR DOING THINGS OUTSIDE YOUR HOME?: NO

## 2024-04-23 SDOH — SOCIAL STABILITY: SOCIAL INSECURITY: ARE YOU OR HAVE YOU BEEN THREATENED OR ABUSED PHYSICALLY, EMOTIONALLY, OR SEXUALLY BY ANYONE?: NO

## 2024-04-23 SDOH — SOCIAL STABILITY: SOCIAL INSECURITY: WERE YOU ABLE TO COMPLETE ALL THE BEHAVIORAL HEALTH SCREENINGS?: YES

## 2024-04-23 SDOH — SOCIAL STABILITY: SOCIAL INSECURITY: HAVE YOU HAD ANY THOUGHTS OF HARMING ANYONE ELSE?: NO

## 2024-04-23 SDOH — SOCIAL STABILITY: SOCIAL INSECURITY: ABUSE: ADULT

## 2024-04-23 ASSESSMENT — ENCOUNTER SYMPTOMS
ABDOMINAL PAIN: 0
WOUND: 1
BLOOD IN STOOL: 0
PSYCHIATRIC NEGATIVE: 1
NAUSEA: 0
CONSTIPATION: 0
RESPIRATORY NEGATIVE: 1
DIARRHEA: 0
VOMITING: 0
HEMATOLOGIC/LYMPHATIC NEGATIVE: 1
CARDIOVASCULAR NEGATIVE: 1
FATIGUE: 0
COLOR CHANGE: 1
ABDOMINAL DISTENTION: 0

## 2024-04-23 ASSESSMENT — ACTIVITIES OF DAILY LIVING (ADL)
ASSISTIVE_DEVICE: OTHER (COMMENT)
PATIENT'S MEMORY ADEQUATE TO SAFELY COMPLETE DAILY ACTIVITIES?: YES
HEARING - RIGHT EAR: FUNCTIONAL
FEEDING YOURSELF: INDEPENDENT
WALKS IN HOME: INDEPENDENT
HEARING - LEFT EAR: FUNCTIONAL
BATHING: INDEPENDENT
JUDGMENT_ADEQUATE_SAFELY_COMPLETE_DAILY_ACTIVITIES: YES
ADEQUATE_TO_COMPLETE_ADL: YES
GROOMING: INDEPENDENT
LACK_OF_TRANSPORTATION: NO
TOILETING: INDEPENDENT
DRESSING YOURSELF: INDEPENDENT

## 2024-04-23 ASSESSMENT — PAIN - FUNCTIONAL ASSESSMENT: PAIN_FUNCTIONAL_ASSESSMENT: 0-10

## 2024-04-23 ASSESSMENT — PAIN DESCRIPTION - PAIN TYPE: TYPE: ACUTE PAIN

## 2024-04-23 ASSESSMENT — LIFESTYLE VARIABLES
HOW OFTEN DO YOU HAVE A DRINK CONTAINING ALCOHOL: 2-3 TIMES A WEEK
SKIP TO QUESTIONS 9-10: 0
HOW MANY STANDARD DRINKS CONTAINING ALCOHOL DO YOU HAVE ON A TYPICAL DAY: 5 OR 6
HOW OFTEN DO YOU HAVE 6 OR MORE DRINKS ON ONE OCCASION: WEEKLY
AUDIT-C TOTAL SCORE: 8
AUDIT-C TOTAL SCORE: 8

## 2024-04-23 ASSESSMENT — COGNITIVE AND FUNCTIONAL STATUS - GENERAL
PATIENT BASELINE BEDBOUND: NO
MOBILITY SCORE: 24
DAILY ACTIVITIY SCORE: 24

## 2024-04-23 ASSESSMENT — PAIN SCALES - GENERAL: PAINLEVEL_OUTOF10: 2

## 2024-04-23 ASSESSMENT — PAIN DESCRIPTION - PROGRESSION: CLINICAL_PROGRESSION: GRADUALLY WORSENING

## 2024-04-23 ASSESSMENT — PATIENT HEALTH QUESTIONNAIRE - PHQ9
1. LITTLE INTEREST OR PLEASURE IN DOING THINGS: NOT AT ALL
SUM OF ALL RESPONSES TO PHQ9 QUESTIONS 1 & 2: 0
2. FEELING DOWN, DEPRESSED OR HOPELESS: NOT AT ALL

## 2024-04-23 ASSESSMENT — COLUMBIA-SUICIDE SEVERITY RATING SCALE - C-SSRS
1. IN THE PAST MONTH, HAVE YOU WISHED YOU WERE DEAD OR WISHED YOU COULD GO TO SLEEP AND NOT WAKE UP?: NO
2. HAVE YOU ACTUALLY HAD ANY THOUGHTS OF KILLING YOURSELF?: NO
6. HAVE YOU EVER DONE ANYTHING, STARTED TO DO ANYTHING, OR PREPARED TO DO ANYTHING TO END YOUR LIFE?: NO

## 2024-04-23 ASSESSMENT — PAIN DESCRIPTION - FREQUENCY: FREQUENCY: CONSTANT/CONTINUOUS

## 2024-04-23 ASSESSMENT — PAIN DESCRIPTION - ORIENTATION: ORIENTATION: MID

## 2024-04-23 ASSESSMENT — PAIN DESCRIPTION - ONSET: ONSET: ONGOING

## 2024-04-23 ASSESSMENT — PAIN DESCRIPTION - DESCRIPTORS: DESCRIPTORS: ACHING

## 2024-04-23 ASSESSMENT — PAIN DESCRIPTION - LOCATION: LOCATION: ABDOMEN

## 2024-04-23 NOTE — H&P
History Of Present Illness  Rambo Daigle is a 64 y.o. male presenting with mesh infection .     Past Medical History  Past Medical History:   Diagnosis Date    Anemia     Crushing injury of right middle finger, initial encounter 11/17/2016    Crushing injury of right middle finger, initial encounter    Displaced fracture of distal phalanx of right middle finger, initial encounter for open fracture 11/17/2016    Open displaced fracture of distal phalanx of right middle finger, initial encounter    GERD (gastroesophageal reflux disease)     Hypertension     Personal history of other diseases of the circulatory system     History of hypertension    Personal history of other diseases of the musculoskeletal system and connective tissue     History of arthritis       Surgical History  Past Surgical History:   Procedure Laterality Date    OTHER SURGICAL HISTORY  04/30/2020    Knee surgery    OTHER SURGICAL HISTORY  04/30/2020    Hip surgery        Social History  He reports that he quit smoking about 13 months ago. His smoking use included cigarettes. He has never used smokeless tobacco. He reports current alcohol use of about 10.0 standard drinks of alcohol per week. He reports that he does not use drugs.    Family History  Family History   Problem Relation Name Age of Onset    Other (denies) Mother          denies history of breast cancer    Atrial fibrillation Father      Other (deafness of hearing loss) Father          Allergies  Patient has no known allergies.    Review of Systems   Constitutional:  Negative for fatigue.   HENT: Negative.     Respiratory: Negative.     Cardiovascular: Negative.    Gastrointestinal:  Negative for abdominal distention, abdominal pain, blood in stool, constipation, diarrhea, nausea and vomiting.   Skin:  Positive for color change and wound. Negative for pallor and rash.   Hematological: Negative.    Psychiatric/Behavioral: Negative.          Physical Exam  Constitutional:       General:  "He is not in acute distress.     Appearance: He is not ill-appearing, toxic-appearing or diaphoretic.   HENT:      Head: Normocephalic.   Eyes:      Extraocular Movements: Extraocular movements intact.      Pupils: Pupils are equal, round, and reactive to light.   Cardiovascular:      Rate and Rhythm: Rhythm irregular.      Heart sounds: Normal heart sounds.   Pulmonary:      Effort: Pulmonary effort is normal.   Abdominal:      General: There is no distension.      Palpations: Abdomen is soft. There is no mass.      Tenderness: There is no abdominal tenderness.      Hernia: No hernia is present.   Skin:     General: Skin is warm.      Findings: Erythema present.   Neurological:      General: No focal deficit present.      Mental Status: He is alert and oriented to person, place, and time.   Psychiatric:         Mood and Affect: Mood normal.         Behavior: Behavior normal.          Last Recorded Vitals  Blood pressure 146/77, pulse 86, temperature 37.1 °C (98.7 °F), temperature source Temporal, resp. rate 16, height 1.956 m (6' 5\"), weight (!) 154 kg (340 lb), SpO2 97%.    Relevant Results        CT abdomen pelvis w IV contrast    Result Date: 4/23/2024  Interpreted By:  Rambo Higginbotham, STUDY: CT ABDOMEN PELVIS W IV CONTRAST;  4/23/2024 2:01 pm   INDICATION: Signs/Symptoms:raf-umbilical redness and drainage.   COMPARISON: None.   ACCESSION NUMBER(S): [id number]   ORDERING CLINICIAN: SHELLY LOERA   TECHNIQUE: CT of the abdomen and pelvis from the lung bases through the symphysis pubis after the uneventful administration of intravenous contrast (100 mL Omnipaque 350). No oral contrast.   FINDINGS: LOWER CHEST: No acute airspace disease.   BONES: No acute skeletal findings.   LIVER: Slightly lobular in contour but not overtly cirrhotic. Not overtly fatty. Not enlarged. All vessels patent. No mass   SPLEEN: Normal. No enlargement, mass or evidence of splenic vein thrombosis.   PANCREAS: Normal. No CT evidence of " acute or chronic pancreatitis. No duct dilation. No mass.   GALLBLADDER: Normal CT appearance. No dilation, calcified, or gas-containing stones.  Other types of gallstones could be occult on CT and detectable only by ultrasound.   BILE DUCTS: Normal. No biliary duct dilation.   ADRENAL GLANDS: Normal. No nodule or mass.   KIDNEYS AND URETERS: Normal except for the 16 mm simple cyst extending medially from the left kidney between the poles. No hydronephrosis on either side.  No mass.  Symmetric enhancement.  No infarct or CT evidence of acute pyelonephritis.  No substantial radiodense stone.  Tiny stones and radiolucent stones could be occult on CT. Note distal ureters obscured on both sides due to streak artifact from hip prostheses on both sides   LYMPH NODES: No adenopathy, intraperitoneal, retroperitoneal, pelvic or otherwise   APPENDIX: Normal.  Not dilated, thick walled or in any other way inflamed in appearance.  No inflammatory change about the appendix.   COLON: Normal. No sign of acute diverticulitis or other colitis. No annular constricting mass.   SMALL BOWEL: Refer to the abdominal wall section, below   STOMACH / DUODENUM: Grossly normal by CT which has limited sensitivity and specificity for the stomach and duodenum.   RETROPERITONEUM: Normal.  No acute hemorrhage or inflammatory change. Lymph nodes in a separate dedicated section.   OMENTUM, MESENTERY AND PERITONEAL SPACES: Free intraperitoneal air: Negative Free intraperitoneal fluid: Negative Abscess: Negative Other: n/a   URINARY BLADDER: Mostly obscured by streak artifact   PELVIS: Most of prostate and seminal vesicles obscured by streak artifact   VASCULATURE: Scattered atherosclerotic calcifications on normal caliber abdominal aorta   ABDOMINAL WALL:   Mesh repair material is directly deep to the umbilical tract. No active/untreated hernia   Gas bubbles I have annotated on axial images 102 and 105 may be part of unformed extraluminal reactive  fluid or less likely in part of a bowel loop that has slipped ventral/superficial to the left lateral margin of the hernia mesh   Another gas bubble on axial 107 (not annotated) is far removed from any possible small bowel involvement. It is part of an approximately 3 cm transverse, 1.7 cm anteroposterior, 1.8 cm craniocaudal fluid collection that extends from the ventral margin of the hernia mass up to the skin   Additional phlegmonous tissue suspected along the umbilical tract, leading up to the skin surface where there is extensive skin thickening along the umbilical tract with infiltration of the normal fat density of the subcutaneous fat deep to the skin all suggestive of cellulitis       Cellulitis around the umbilicus   Along the umbilical tract deeper from the skin, probable 3 x 1.8 x 1.7 cm fluid abscess   The gas bubble I have annotated on axial image 105 and another on axial 108 are superficial enough they could theoretically have been pushed into the umbilical tract from outside   On the other hand, a cluster of gas bubbles I have annotated on axial 102 are deep enough to be immediately superficial to the mesh hernia repair material. These bubbles are more alarming that there may be a developing sinus tract from the umbilicus into the peritoneal space, even potentially a burgeoning enterocutaneous fistula. Note one or two segments of small bowel passed directly deep to and adjacent to the left lateral margin of the hernia mesh where the deeper gas bubbles extend. Perhaps the gas bubbles on axial 102 are from a partially collapsed short segment of small bowel that has passed ventral to the mesh   Note in the subcutaneous fat to the right of at, and just above the umbilical cellulitis, there are two subcutaneous foreign bodies, one approximately 18 mm long on sagittal 97, the other approximately 11 mm long on sagittal 95/96. The curvilinear shape suggests they could be suture needles, although the  attenuation is fairly low. Note also the round, much higher density foreign body in the subcutaneous fat to the left of and well below the umbilicus on axial image 120; this one resembles a ballistic fragment or BB   No other acute findings   MACRO: None   Signed by: Rambo Higginbotham 4/23/2024 2:42 PM Dictation workstation:   [id number]        Assessment/Plan   Principal Problem:    Infected hernioplasty mesh, initial encounter (CMS-[facility])      Pt seen and examined in ED, erythema marked with pen, + drainage.  Will admit, continue IV Zosyn and Vancomycin, hold Xeralta.  Will likely need mesh removed this admission. Possible bowel resection,  pt agreeable with plan.         I spent 60 minutes in the professional and overall care of this patient.      Anna Gillis MD

## 2024-04-23 NOTE — ED PROVIDER NOTES
Chief Complaint   Patient presents with    Hernia     Bleeding around navel        HPI       64 year old male presents to the Emergency Department today complaining of a 1 week history of swelling and a throbbing pain to the umbilical region. Notes to have redness and drainage this am. Denies any associated fever, chills, headache, neck pain, chest pain, shortness of breath, abdominal pain, nausea, vomiting, diarrhea, constipation, or urinary symptoms.       History provided by:  Patient             Patient History   Past Medical History:   Diagnosis Date    Anemia     Crushing injury of right middle finger, initial encounter 2016    Crushing injury of right middle finger, initial encounter    Displaced fracture of distal phalanx of right middle finger, initial encounter for open fracture 2016    Open displaced fracture of distal phalanx of right middle finger, initial encounter    GERD (gastroesophageal reflux disease)     Hypertension     Personal history of other diseases of the circulatory system     History of hypertension    Personal history of other diseases of the musculoskeletal system and connective tissue     History of arthritis     Past Surgical History:   Procedure Laterality Date    OTHER SURGICAL HISTORY  2020    Knee surgery    OTHER SURGICAL HISTORY  2020    Hip surgery     Family History   Problem Relation Name Age of Onset    Other (denies) Mother          denies history of breast cancer    Atrial fibrillation Father      Other (deafness of hearing loss) Father       Social History     Tobacco Use    Smoking status: Former     Current packs/day: 0.00     Types: Cigarettes     Quit date: 3/6/2023     Years since quittin.1    Smokeless tobacco: Never   Vaping Use    Vaping status: Never Used   Substance Use Topics    Alcohol use: Yes     Alcohol/week: 10.0 standard drinks of alcohol     Types: 10 Standard drinks or equivalent per week     Comment: weekly    Drug use:  Never           Physical Exam  Constitutional:       Appearance: Normal appearance.   HENT:      Head: Normocephalic.      Right Ear: Tympanic membrane, ear canal and external ear normal.      Left Ear: Tympanic membrane, ear canal and external ear normal.      Nose: Nose normal.      Mouth/Throat:      Mouth: Mucous membranes are moist.      Pharynx: Oropharynx is clear. No oropharyngeal exudate or posterior oropharyngeal erythema.   Eyes:      Conjunctiva/sclera: Conjunctivae normal.      Pupils: Pupils are equal, round, and reactive to light.   Cardiovascular:      Rate and Rhythm: Normal rate and regular rhythm.      Pulses:           Radial pulses are 3+ on the right side and 3+ on the left side.        Dorsalis pedis pulses are 3+ on the right side and 3+ on the left side.      Heart sounds: Normal heart sounds. No murmur heard.     No friction rub. No gallop.   Pulmonary:      Effort: Pulmonary effort is normal. No respiratory distress.      Breath sounds: Normal breath sounds. No wheezing, rhonchi or rales.   Abdominal:      General: Abdomen is flat. Bowel sounds are normal.      Palpations: Abdomen is soft.      Tenderness: There is no abdominal tenderness. There is no right CVA tenderness, left CVA tenderness, guarding or rebound. Negative signs include Valentin's sign and McBurney's sign.      Comments: Diffuse periumbilical redness with with an area of induration and drainage noted. Tenderness present as well.    Musculoskeletal:         General: No swelling or deformity.      Cervical back: Full passive range of motion without pain.      Right lower leg: No edema.      Left lower leg: No edema.   Lymphadenopathy:      Cervical: No cervical adenopathy.   Skin:     Capillary Refill: Capillary refill takes less than 2 seconds.      Coloration: Skin is not jaundiced.      Findings: No rash.   Neurological:      General: No focal deficit present.      Mental Status: He is alert and oriented to person, place,  and time. Mental status is at baseline.      Gait: Gait is intact.   Psychiatric:         Mood and Affect: Mood normal.         Behavior: Behavior is cooperative.         Labs Reviewed   BASIC METABOLIC PANEL - Normal       Result Value    Glucose 97      Sodium 137      Potassium 4.6      Chloride 101      Bicarbonate 30      Anion Gap 11      Urea Nitrogen 17      Creatinine 1.05      eGFR 78      Calcium 9.5     LACTATE - Normal    Lactate 0.8      Narrative:     Venipuncture immediately after or during the administration of Metamizole may lead to falsely low results. Testing should be performed immediately  prior to Metamizole dosing.   CBC WITH AUTO DIFFERENTIAL    WBC 7.3      nRBC 0.0      RBC 4.63      Hemoglobin 14.4      Hematocrit 44.1      MCV 95      MCH 31.1      MCHC 32.7      RDW 13.2      Platelets 318      Neutrophils % 69.8      Immature Granulocytes %, Automated 0.5      Lymphocytes % 18.5      Monocytes % 8.9      Eosinophils % 1.5      Basophils % 0.8      Neutrophils Absolute 5.09      Immature Granulocytes Absolute, Automated 0.04      Lymphocytes Absolute 1.35      Monocytes Absolute 0.65      Eosinophils Absolute 0.11      Basophils Absolute 0.06         CT abdomen pelvis w IV contrast   Final Result   Cellulitis around the umbilicus        Along the umbilical tract deeper from the skin, probable 3 x 1.8 x   1.7 cm fluid abscess        The gas bubble I have annotated on axial image 105 and another on   axial 108 are superficial enough they could theoretically have been   pushed into the umbilical tract from outside        On the other hand, a cluster of gas bubbles I have annotated on axial   102 are deep enough to be immediately superficial to the mesh hernia   repair material. These bubbles are more alarming that there may be a   developing sinus tract from the umbilicus into the peritoneal space,   even potentially a burgeoning enterocutaneous fistula. Note one or   two segments of small  bowel passed directly deep to and adjacent to   the left lateral margin of the hernia mesh where the deeper gas   bubbles extend. Perhaps the gas bubbles on axial 102 are from a   partially collapsed short segment of small bowel that has passed   ventral to the mesh        Note in the subcutaneous fat to the right of at, and just above the   umbilical cellulitis, there are two subcutaneous foreign bodies, one   approximately 18 mm long on sagittal 97, the other approximately 11   mm long on sagittal 95/96. The curvilinear shape suggests they could   be suture needles, although the attenuation is fairly low. Note also   the round, much higher density foreign body in the subcutaneous fat   to the left of and well below the umbilicus on axial image 120; this   one resembles a ballistic fragment or BB        No other acute findings        MACRO:   None        Signed by: Rambo Higginbotham 4/23/2024 2:42 PM   Dictation workstation:   [id number]               ED Course & MDM            Medical Decision Making  Patient was seen and evaluated by Dr. Gudino. Saline lock was established with labs drawn and results as above. Given 1 Liter of normal saline wide open over 1 hour. Given Toradol as well. After receiving the medication and IV fluids, reported feeling improved. Blood counts, electrolytes, lactate, and kidney function are unremarkable. CT scan of his abdomen and pelvis showed an umbilical abscess. Treated with Vancomycin and Zosyn. Surgery to be consulted.            Your medication list        ASK your doctor about these medications        Instructions Last Dose Given Next Dose Due   cholecalciferol 125 MCG (5000 UT) capsule  Commonly known as: Vitamin D-3           dilTIAZem  mg 24 hr capsule  Commonly known as: Cardizem CD           furosemide 20 mg tablet  Commonly known as: Lasix           hydroCHLOROthiazide 25 mg tablet  Commonly known as: HYDRODiuril           rivaroxaban 20 mg tablet  Commonly known as:  Xarelto      Take 1 tablet (20 mg) by mouth once daily.                  Procedure  Procedures     Smith Mendosa, PAYTON-CNP  04/23/24 0823

## 2024-04-23 NOTE — PROGRESS NOTES
"Vancomycin Dosing by Pharmacy- INITIAL    Rambo IDALMIS Daigle is a 64 y.o. year old male who Pharmacy has been consulted for vancomycin dosing for other infected prosthetic . Based on the patient's indication and renal status this patient will be dosed based on a goal AUC of 400-600.     Renal function is currently stable.    Visit Vitals  /88   Pulse 64   Temp 37.1 °C (98.7 °F) (Temporal)   Resp 16        Lab Results   Component Value Date    CREATININE 1.05 04/23/2024    CREATININE 1.00 01/19/2024    CREATININE 0.96 06/09/2023    CREATININE 0.99 01/25/2023    CREATININE 0.94 07/15/2022    CREATININE 1.00 03/16/2022        Patient weight is No results found for: \"PTWEIGHT\"    No results found for: \"CULTURE\"     No intake/output data recorded.  [unfilled]    No results found for: \"PATIENTTEMP\"       Assessment/Plan     Patient has already been given a loading dose of 2000 mg.  Will initiate vancomycin maintenance,  1000 mg every 12 hours.    This dosing regimen is predicted by [employer] to result in the following pharmacokinetic parameters:  Regimen: 1000 mg IV every 12 hours.  Start time: 01:34 on 04/24/2024  Exposure target: AUC24 (range)400-600 mg/L.hr   AUC24,ss: 474 mg/L.hr  Probability of AUC24 > 400: 63 %  Ctrough,ss: 13.4 mg/L  Probability of Ctrough,ss > 20: 30 %  Probability of nephrotoxicity (Lodise COURTNEY 2009): 9 %      Follow-up level will be ordered on 4/25 at 5:00 unless clinically indicated sooner.  Will continue to monitor renal function daily while on vancomycin and order serum creatinine at least every 48 hours if not already ordered.  Follow for continued vancomycin needs, clinical response, and signs/symptoms of toxicity.       Erick Lewis, PharmD       "

## 2024-04-24 ENCOUNTER — ANESTHESIA EVENT (OUTPATIENT)
Dept: OPERATING ROOM | Facility: HOSPITAL | Age: 64
DRG: 908 | End: 2024-04-24
Payer: MEDICARE

## 2024-04-24 PROCEDURE — 1100000001 HC PRIVATE ROOM DAILY

## 2024-04-24 PROCEDURE — 2500000004 HC RX 250 GENERAL PHARMACY W/ HCPCS (ALT 636 FOR OP/ED): Performed by: SURGERY

## 2024-04-24 PROCEDURE — C9113 INJ PANTOPRAZOLE SODIUM, VIA: HCPCS | Performed by: SURGERY

## 2024-04-24 PROCEDURE — 99223 1ST HOSP IP/OBS HIGH 75: CPT | Performed by: STUDENT IN AN ORGANIZED HEALTH CARE EDUCATION/TRAINING PROGRAM

## 2024-04-24 PROCEDURE — 2500000004 HC RX 250 GENERAL PHARMACY W/ HCPCS (ALT 636 FOR OP/ED)

## 2024-04-24 RX ORDER — IPRATROPIUM BROMIDE AND ALBUTEROL SULFATE 2.5; .5 MG/3ML; MG/3ML
3 SOLUTION RESPIRATORY (INHALATION) EVERY 6 HOURS PRN
Status: DISCONTINUED | OUTPATIENT
Start: 2024-04-24 | End: 2024-05-04 | Stop reason: HOSPADM

## 2024-04-24 RX ADMIN — ENOXAPARIN SODIUM 40 MG: 40 INJECTION SUBCUTANEOUS at 08:24

## 2024-04-24 RX ADMIN — SODIUM CHLORIDE, SODIUM LACTATE, POTASSIUM CHLORIDE, CALCIUM CHLORIDE AND DEXTROSE MONOHYDRATE 100 ML/HR: 5; 600; 310; 30; 20 INJECTION, SOLUTION INTRAVENOUS at 06:12

## 2024-04-24 RX ADMIN — PIPERACILLIN SODIUM AND TAZOBACTAM SODIUM 3.38 G: 3; .375 INJECTION, SOLUTION INTRAVENOUS at 12:15

## 2024-04-24 RX ADMIN — VANCOMYCIN HYDROCHLORIDE 1500 MG: 1.5 INJECTION, POWDER, LYOPHILIZED, FOR SOLUTION INTRAVENOUS at 23:14

## 2024-04-24 RX ADMIN — PANTOPRAZOLE SODIUM 40 MG: 40 INJECTION, POWDER, FOR SOLUTION INTRAVENOUS at 06:10

## 2024-04-24 RX ADMIN — VANCOMYCIN HYDROCHLORIDE 1500 MG: 1.5 INJECTION, POWDER, LYOPHILIZED, FOR SOLUTION INTRAVENOUS at 10:09

## 2024-04-24 RX ADMIN — PIPERACILLIN SODIUM AND TAZOBACTAM SODIUM 3.38 G: 3; .375 INJECTION, SOLUTION INTRAVENOUS at 00:02

## 2024-04-24 RX ADMIN — PIPERACILLIN SODIUM AND TAZOBACTAM SODIUM 3.38 G: 3; .375 INJECTION, SOLUTION INTRAVENOUS at 17:46

## 2024-04-24 RX ADMIN — PIPERACILLIN SODIUM AND TAZOBACTAM SODIUM 3.38 G: 3; .375 INJECTION, SOLUTION INTRAVENOUS at 06:09

## 2024-04-24 RX ADMIN — ENOXAPARIN SODIUM 40 MG: 40 INJECTION SUBCUTANEOUS at 23:14

## 2024-04-24 RX ADMIN — SODIUM CHLORIDE, SODIUM LACTATE, POTASSIUM CHLORIDE, CALCIUM CHLORIDE AND DEXTROSE MONOHYDRATE 125 ML/HR: 5; 600; 310; 30; 20 INJECTION, SOLUTION INTRAVENOUS at 17:52

## 2024-04-24 ASSESSMENT — COGNITIVE AND FUNCTIONAL STATUS - GENERAL
MOBILITY SCORE: 24
DRESSING REGULAR LOWER BODY CLOTHING: A LITTLE
DAILY ACTIVITIY SCORE: 23
DRESSING REGULAR LOWER BODY CLOTHING: A LITTLE
DAILY ACTIVITIY SCORE: 24
MOBILITY SCORE: 24
DAILY ACTIVITIY SCORE: 23
MOBILITY SCORE: 24

## 2024-04-24 ASSESSMENT — PAIN SCALES - GENERAL: PAINLEVEL_OUTOF10: 2

## 2024-04-24 NOTE — CONSULTS
"Medicine History & Physical:    CC: umbilical pain    Reason for Consult: pre-op medical eval    HPI:  64M hx Afib, HFpEF, HTN, venous insufficiency, COPD, NA, GERD, lumbar radiculopathy, obesity, umbilical hernia s/p repair presented to ED for abdominal pain    1 week of swelling and pain around umbilicus. redness and bloody drainage. no f/c. no other recent sx. denies n/v, diarrhea/constipation, bleeding/melena, cp, sob. exercise tolerance fair, able to walk >1 mi and 1-2 flights of stairs. no orthopnea or exertional cp. no dysuria, ha, dizziness, weakness, numbness    PMH: Afib, HFpEF, HTN, venous insufficiency, COPD, NA, GERD, lumbar radiculopathy, obesity  PSH: finger surgery, L hip, bilateral knee replacements, hernia repair  FH: n/a  SH: lives in [place]. hx smoking, quit. daily etoh use, a few drinks per day. no drugs  Meds: see med rec for full. supps?  Allergies: nkda    ED Course:  VSS. umbilical erythema on exam. labs/imaging all wnl. ekg w/ afib, anterior q-waves. ct a/p w/ abscess and potential enterocutaneous fistula surrounding surgical mesh. s/p vanc, zosyn, protonix. admitted to surgery.    ROS reviewed and negative other than noted in HPI    Visit Vitals  /81 (BP Location: Left arm, Patient Position: Lying)   Pulse 62   Temp 36.2 °C (97.2 °F) (Temporal)   Resp 18   Ht 1.956 m (6' 5\")   Wt (!) 153 kg (336 lb 12.8 oz)   SpO2 99%   BMI 39.94 kg/m²   Smoking Status Former   BSA 2.88 m²     Physical Exam:  General: NAD, cooperative. no jaundice, pallor, rash, bruises  HEENT: NC/AT, MMM, no oral lesions or pharyngeal erythema. PERRL. no scleral icterus or conjunctival injection. neck soft w/o masses or LAD.  CV: RRR, no murmurs, rubs, or gallops. No JVD.  Chest: breathing unlabored. CTAB w/ adequate air-entry.  Abd: umbilical purulent discharge and erythema. no crepitus. non-distended. BS+. soft, non-tender.   Extr: wwp, 2+ and symmetric peripheral pulses. no LE edema.  Neuro: AAOx3. " CNII-XII intact. no focal findings.     Results:  - all relevant laboratory and radiographic data reviewed    Assessment/Plan:  64M hx Afib, HFpEF, HTN, venous insufficiency, COPD, NA, GERD, lumbar radiculopathy, obesity, umbilical hernia s/p repair hospitalized for hernioplasty mesh infection. Medicine consulted for pre-op clearance    #surgical mesh infection:  #hx umbilical hernia s/p repair:  by 4/23 ct a/p. w/ abscess, ?enterocutaneous fistula. no sepsis   - agree with current abx per surgery  - moderate-risk surgery, RCRI class IV, which carries an 11% risk of major CV complications, though patient able to achieve > 4 METS of activity routinely. Patient's other comorbid conditions are stable. Therefore there are no medical contraindications currently to surgery     #afib: stable; permanent, non-valvular, cv 3. home dilt; xarelto held (no need to bridge)  #HFpEF: compensated; diuretics being held per surgery  #HTN: stable; home dilt; diuretics being held per surgery  #venous insufficiency: stable  #COPD: stable off tx; albuterol prn  #NA: home cpap  #GERD: stable off tx  #lumbar radiculopathy: stable; tylenol prn  #obesity: bmi 39. outpt pcp mgt    #FEN/GI: npo; ivf  #PPx: lovenox  #Lines/Tubes/Drains: piv  #Analgesia/Sedation: tylenol prn  #Code: full    Jesus Toure MD

## 2024-04-24 NOTE — CARE PLAN
Problem: Pain  Goal: My pain/discomfort is manageable  Outcome: Progressing     Problem: Daily Care  Goal: Daily care needs are met  Outcome: Progressing     Problem: Discharge Barriers  Goal: My discharge needs are met  Outcome: Progressing       The clinical goals for the shift include Pt will rate pain a 4 or less by end of this shift.    No fall or injury this shift. Current safety interventions continue, All needs met this shift. No new skin breakdown this shift.

## 2024-04-24 NOTE — PROGRESS NOTES
"Vancomycin Dosing by Pharmacy- FOLLOW UP    Rambo Daigle is a 64 y.o. year old male who Pharmacy has been consulted for vancomycin dosing for Vancomycin Indications: Skin & Soft Tissue/prosthetic device infection. Based on the patient's indication and renal status this patient will be dosed based on a goal AUC of 500-600.     Renal function is currently stable.    Current vancomycin dose:  1000 mg every 12 hours    Estimated vancomycin AUC on current dose: <400 mg/L.hr     Visit Vitals  /78 (BP Location: Right arm, Patient Position: Lying)   Pulse 66   Temp 35.9 °C (96.6 °F) (Temporal)   Resp 18           Lab Results   Component Value Date    CREATININE 1.05 04/23/2024    CREATININE 1.00 01/19/2024    CREATININE 0.96 06/09/2023    CREATININE 0.99 01/25/2023    CREATININE 0.94 07/15/2022    CREATININE 1.00 03/16/2022       Patient weight is No results found for: \"PTWEIGHT\"    No results found for: \"CULTURE\"    I/O last 3 completed shifts:  In: 1101.7 (7.2 mL/kg) [P.O.:120; I.V.:831.7 (5.4 mL/kg); IV Piggyback:150]  Out: 300 (2 mL/kg) [Urine:300 (0.1 mL/kg/hr)]  Weight: 152.8 kg     No results found for: \"PATIENTTEMP\"       Assessment/Plan     Below goal AUC. Orders placed for new vancomcyin regimen of 1500 every 12 hours to begin at 0900.     This dosing regimen is predicted by [employer] to result in the following pharmacokinetic parameters:  Loading dose: N/A  Regimen: 1500 mg IV every 12 hours.  Start time: 08:10 on 04/24/2024  Exposure target: AUC24 (range)400-600 mg/L.hr   AUC24,ss: 543 mg/L.hr  Probability of AUC24 > 400: 81 %  Ctrough,ss: 18.3 mg/L  Probability of Ctrough,ss > 20: 42 %  Probability of nephrotoxicity (Lodise COURTNEY 2009): 15 %    The next level will be obtained on 4/25 at 0500. May be obtained sooner if clinically indicated.   Will continue to monitor renal function daily while on vancomycin and order serum creatinine at least every 48 hours if not already ordered.  Follow for continued " vancomycin needs, clinical response, and signs/symptoms of toxicity.     Richard EscotoD, BCPS

## 2024-04-24 NOTE — PROGRESS NOTES
"GENERAL SURGERY PROGRESS NOTE    Rambo Daigle   1960   [id number]     Rambo Daigle is a 64 y.o. male on day 1 of admission presenting with Infected hernioplasty mesh, initial encounter (CMS-[facility]).    Subjective  No acute events overnight. Patient reports tenderness and drainage at his umbilicus, otherwise denies abdominal pain. Denies nausea or vomiting. Is passing flatus and having bowel movements.     Review of Systems:  Review of Systems    Objective    Last Recorded Vitals  Blood pressure 127/66, pulse 61, temperature 36.6 °C (97.9 °F), temperature source Temporal, resp. rate 18, height 1.956 m (6' 5\"), weight (!) 153 kg (336 lb 12.8 oz), SpO2 98%.    Intake/Output last 3 Shifts:  I/O last 3 completed shifts:  In: 1101.7 (7.2 mL/kg) [P.O.:120; I.V.:831.7 (5.4 mL/kg); IV Piggyback:150]  Out: 300 (2 mL/kg) [Urine:300 (0.1 mL/kg/hr)]  Weight: 152.8 kg     Intake/Output Summary (Last 24 hours) at 4/24/2024 1447  Last data filed at 4/24/2024 1219  Gross per 24 hour   Intake 1151.67 ml   Output 800 ml   Net 351.67 ml       Physical Exam  Constitutional:       General: He is not in acute distress.  HENT:      Head: Normocephalic and atraumatic.   Eyes:      Extraocular Movements: Extraocular movements intact.      Conjunctiva/sclera: Conjunctivae normal.   Cardiovascular:      Rate and Rhythm: Normal rate and regular rhythm.      Pulses: Normal pulses.   Pulmonary:      Effort: Pulmonary effort is normal. No respiratory distress.   Abdominal:      Palpations: Abdomen is soft.      Comments: Non-distended. Induration and erythema at the umbilicus with some purulent drainage, decrease in erythema from previous outline, mild tenderness in the area. Abdomen otherwise non-tender. No guarding.    Musculoskeletal:         General: No swelling. Normal range of motion.      Cervical back: Neck supple.   Neurological:      General: No focal deficit present.      Mental Status: He is alert and oriented to person, place, and " time.         Relevant Results  Labs:  Results for orders placed or performed during the hospital encounter of 04/23/24 (from the past 24 hour(s))   CBC   Result Value Ref Range    WBC 7.6 4.4 - 11.3 x10*3/uL    nRBC 0.0 0.0 - 0.0 /100 WBCs    RBC 4.22 (L) 4.50 - 5.90 x10*6/uL    Hemoglobin 13.4 (L) 13.5 - 17.5 g/dL    Hematocrit 39.5 (L) 41.0 - 52.0 %    MCV 94 80 - 100 fL    MCH 31.8 26.0 - 34.0 pg    MCHC 33.9 32.0 - 36.0 g/dL    RDW 13.1 11.5 - 14.5 %    Platelets 274 150 - 450 x10*3/uL   Magnesium   Result Value Ref Range    Magnesium 1.93 1.60 - 2.40 mg/dL       Images:  CT abdomen pelvis w IV contrast   Final Result   Cellulitis around the umbilicus        Along the umbilical tract deeper from the skin, probable 3 x 1.8 x   1.7 cm fluid abscess        The gas bubble I have annotated on axial image 105 and another on   axial 108 are superficial enough they could theoretically have been   pushed into the umbilical tract from outside        On the other hand, a cluster of gas bubbles I have annotated on axial   102 are deep enough to be immediately superficial to the mesh hernia   repair material. These bubbles are more alarming that there may be a   developing sinus tract from the umbilicus into the peritoneal space,   even potentially a burgeoning enterocutaneous fistula. Note one or   two segments of small bowel passed directly deep to and adjacent to   the left lateral margin of the hernia mesh where the deeper gas   bubbles extend. Perhaps the gas bubbles on axial 102 are from a   partially collapsed short segment of small bowel that has passed   ventral to the mesh        Note in the subcutaneous fat to the right of at, and just above the   umbilical cellulitis, there are two subcutaneous foreign bodies, one   approximately 18 mm long on sagittal 97, the other approximately 11   mm long on sagittal 95/96. The curvilinear shape suggests they could   be suture needles, although the attenuation is fairly low.  Note also   the round, much higher density foreign body in the subcutaneous fat   to the left of and well below the umbilicus on axial image 120; this   one resembles a ballistic fragment or BB        No other acute findings        MACRO:   None        Signed by: Rambo Higginbotham 4/23/2024 2:42 PM   Dictation workstation:   [id number]          Assessment and Plan  Principal Problem:    Infected hernioplasty mesh, initial encounter (CMS-[facility])    64 y.o. male with history of Afib on Xarelto presenting with umbilical erythema and drainage due to infected prior hernia repair mesh.     Plan:  -Continue IV antibiotics: Zosyn and vancomycin  -Plan for OR tomorrow for exploratory laparotomy, mesh explantation, possible small bowel resection as Xarelto will be held for 48 hours  -NPO, IVF  -Continue to hold Xarelto  -IMS consult for raf-operative evaluation and medical management  -SCDs    Discussed with attending Dr. Melissa Anderson, DO - PGY3  General Surgery

## 2024-04-24 NOTE — PROGRESS NOTES
04/24/24 1117   Discharge Planning   Living Arrangements Spouse/significant other   Support Systems Spouse/significant other;Children   Assistance Needed none   Type of Residence Private residence   Home or Post Acute Services None   Patient expects to be discharged to: Home   Does the patient need discharge transport arranged? No     Patient independent from home with spouse. Admitted under surgery for concern for infected mesh. Anticipate additional surgery with bowel resection per Dr Gillis's note. Patient with nursing [facility] 24/24 no therapy concerns. Anticipate discharge home once medically ready. TCC to follow for DC planning and needs if they arise.

## 2024-04-25 ENCOUNTER — APPOINTMENT (OUTPATIENT)
Dept: PRIMARY CARE | Facility: CLINIC | Age: 64
End: 2024-04-25
Payer: MEDICARE

## 2024-04-25 ENCOUNTER — ANESTHESIA (OUTPATIENT)
Dept: OPERATING ROOM | Facility: HOSPITAL | Age: 64
DRG: 908 | End: 2024-04-25
Payer: MEDICARE

## 2024-04-25 PROBLEM — G47.33 OSA (OBSTRUCTIVE SLEEP APNEA): Status: ACTIVE | Noted: 2024-04-25

## 2024-04-25 LAB
ANION GAP SERPL CALC-SCNC: 11 MMOL/L (ref 10–20)
BUN SERPL-MCNC: 11 MG/DL (ref 6–23)
CALCIUM SERPL-MCNC: 8.7 MG/DL (ref 8.6–10.3)
CHLORIDE SERPL-SCNC: 105 MMOL/L (ref 98–107)
CO2 SERPL-SCNC: 27 MMOL/L (ref 21–32)
CREAT SERPL-MCNC: 1.05 MG/DL (ref 0.5–1.3)
EGFRCR SERPLBLD CKD-EPI 2021: 78 ML/MIN/1.73M*2
ERYTHROCYTE [DISTWIDTH] IN BLOOD BY AUTOMATED COUNT: 13.1 % (ref 11.5–14.5)
GLUCOSE SERPL-MCNC: 104 MG/DL (ref 74–99)
HCT VFR BLD AUTO: 37.4 % (ref 41–52)
HGB BLD-MCNC: 12.6 G/DL (ref 13.5–17.5)
MAGNESIUM SERPL-MCNC: 1.94 MG/DL (ref 1.6–2.4)
MCH RBC QN AUTO: 31.7 PG (ref 26–34)
MCHC RBC AUTO-ENTMCNC: 33.7 G/DL (ref 32–36)
MCV RBC AUTO: 94 FL (ref 80–100)
NRBC BLD-RTO: 0 /100 WBCS (ref 0–0)
PHOSPHATE SERPL-MCNC: 3.5 MG/DL (ref 2.5–4.9)
PLATELET # BLD AUTO: 241 X10*3/UL (ref 150–450)
POTASSIUM SERPL-SCNC: 3.8 MMOL/L (ref 3.5–5.3)
RBC # BLD AUTO: 3.98 X10*6/UL (ref 4.5–5.9)
SODIUM SERPL-SCNC: 139 MMOL/L (ref 136–145)
VANCOMYCIN SERPL-MCNC: 20 UG/ML (ref 5–20)
WBC # BLD AUTO: 5.4 X10*3/UL (ref 4.4–11.3)

## 2024-04-25 PROCEDURE — 99233 SBSQ HOSP IP/OBS HIGH 50: CPT | Performed by: PHYSICIAN ASSISTANT

## 2024-04-25 PROCEDURE — 3700000001 HC GENERAL ANESTHESIA TIME - INITIAL BASE CHARGE: Performed by: SURGERY

## 2024-04-25 PROCEDURE — 7100000002 HC RECOVERY ROOM TIME - EACH INCREMENTAL 1 MINUTE: Performed by: SURGERY

## 2024-04-25 PROCEDURE — 2500000004 HC RX 250 GENERAL PHARMACY W/ HCPCS (ALT 636 FOR OP/ED): Performed by: NURSE ANESTHETIST, CERTIFIED REGISTERED

## 2024-04-25 PROCEDURE — 85027 COMPLETE CBC AUTOMATED: CPT | Performed by: SURGERY

## 2024-04-25 PROCEDURE — 88307 TISSUE EXAM BY PATHOLOGIST: CPT | Mod: TC,PORLAB | Performed by: SURGERY

## 2024-04-25 PROCEDURE — 83735 ASSAY OF MAGNESIUM: CPT | Performed by: SURGERY

## 2024-04-25 PROCEDURE — 84100 ASSAY OF PHOSPHORUS: CPT | Performed by: SURGERY

## 2024-04-25 PROCEDURE — 2500000004 HC RX 250 GENERAL PHARMACY W/ HCPCS (ALT 636 FOR OP/ED): Performed by: SURGERY

## 2024-04-25 PROCEDURE — 80202 ASSAY OF VANCOMYCIN: CPT | Performed by: SURGERY

## 2024-04-25 PROCEDURE — 2500000001 HC RX 250 WO HCPCS SELF ADMINISTERED DRUGS (ALT 637 FOR MEDICARE OP): Performed by: SURGERY

## 2024-04-25 PROCEDURE — 3700000002 HC GENERAL ANESTHESIA TIME - EACH INCREMENTAL 1 MINUTE: Performed by: SURGERY

## 2024-04-25 PROCEDURE — 88304 TISSUE EXAM BY PATHOLOGIST: CPT | Performed by: PATHOLOGY

## 2024-04-25 PROCEDURE — 88307 TISSUE EXAM BY PATHOLOGIST: CPT | Performed by: PATHOLOGY

## 2024-04-25 PROCEDURE — 36415 COLL VENOUS BLD VENIPUNCTURE: CPT | Performed by: SURGERY

## 2024-04-25 PROCEDURE — 2500000004 HC RX 250 GENERAL PHARMACY W/ HCPCS (ALT 636 FOR OP/ED): Performed by: ANESTHESIOLOGY

## 2024-04-25 PROCEDURE — 3600000003 HC OR TIME - INITIAL BASE CHARGE - PROCEDURE LEVEL THREE: Performed by: SURGERY

## 2024-04-25 PROCEDURE — C9113 INJ PANTOPRAZOLE SODIUM, VIA: HCPCS | Performed by: SURGERY

## 2024-04-25 PROCEDURE — 2500000005 HC RX 250 GENERAL PHARMACY W/O HCPCS: Performed by: NURSE ANESTHETIST, CERTIFIED REGISTERED

## 2024-04-25 PROCEDURE — 80048 BASIC METABOLIC PNL TOTAL CA: CPT | Performed by: SURGERY

## 2024-04-25 PROCEDURE — 2720000007 HC OR 272 NO HCPCS: Performed by: SURGERY

## 2024-04-25 PROCEDURE — 0WPF0JZ REMOVAL OF SYNTHETIC SUBSTITUTE FROM ABDOMINAL WALL, OPEN APPROACH: ICD-10-PCS | Performed by: SURGERY

## 2024-04-25 PROCEDURE — 1100000001 HC PRIVATE ROOM DAILY

## 2024-04-25 PROCEDURE — 2500000004 HC RX 250 GENERAL PHARMACY W/ HCPCS (ALT 636 FOR OP/ED)

## 2024-04-25 PROCEDURE — 0DT80ZZ RESECTION OF SMALL INTESTINE, OPEN APPROACH: ICD-10-PCS | Performed by: SURGERY

## 2024-04-25 PROCEDURE — 3600000008 HC OR TIME - EACH INCREMENTAL 1 MINUTE - PROCEDURE LEVEL THREE: Performed by: SURGERY

## 2024-04-25 PROCEDURE — 7100000001 HC RECOVERY ROOM TIME - INITIAL BASE CHARGE: Performed by: SURGERY

## 2024-04-25 RX ORDER — MORPHINE SULFATE 2 MG/ML
2 INJECTION, SOLUTION INTRAMUSCULAR; INTRAVENOUS
Status: DISCONTINUED | OUTPATIENT
Start: 2024-04-25 | End: 2024-04-26

## 2024-04-25 RX ORDER — SODIUM CHLORIDE, SODIUM LACTATE, POTASSIUM CHLORIDE, CALCIUM CHLORIDE 600; 310; 30; 20 MG/100ML; MG/100ML; MG/100ML; MG/100ML
100 INJECTION, SOLUTION INTRAVENOUS CONTINUOUS
Status: DISCONTINUED | OUTPATIENT
Start: 2024-04-25 | End: 2024-04-25

## 2024-04-25 RX ORDER — ONDANSETRON HYDROCHLORIDE 2 MG/ML
4 INJECTION, SOLUTION INTRAVENOUS ONCE AS NEEDED
Status: DISCONTINUED | OUTPATIENT
Start: 2024-04-25 | End: 2024-04-25 | Stop reason: HOSPADM

## 2024-04-25 RX ORDER — HYDROMORPHONE HYDROCHLORIDE 1 MG/ML
INJECTION, SOLUTION INTRAMUSCULAR; INTRAVENOUS; SUBCUTANEOUS AS NEEDED
Status: DISCONTINUED | OUTPATIENT
Start: 2024-04-25 | End: 2024-04-25

## 2024-04-25 RX ORDER — MIDAZOLAM HYDROCHLORIDE 1 MG/ML
INJECTION, SOLUTION INTRAMUSCULAR; INTRAVENOUS AS NEEDED
Status: DISCONTINUED | OUTPATIENT
Start: 2024-04-25 | End: 2024-04-25

## 2024-04-25 RX ORDER — SODIUM CHLORIDE, SODIUM LACTATE, POTASSIUM CHLORIDE, CALCIUM CHLORIDE 600; 310; 30; 20 MG/100ML; MG/100ML; MG/100ML; MG/100ML
100 INJECTION, SOLUTION INTRAVENOUS CONTINUOUS
Status: DISCONTINUED | OUTPATIENT
Start: 2024-04-25 | End: 2024-04-25 | Stop reason: HOSPADM

## 2024-04-25 RX ORDER — BISMUTH SUBSALICYLATE 262 MG
1 TABLET,CHEWABLE ORAL DAILY
COMMUNITY

## 2024-04-25 RX ORDER — ALBUTEROL SULFATE 0.83 MG/ML
2.5 SOLUTION RESPIRATORY (INHALATION) ONCE AS NEEDED
Status: DISCONTINUED | OUTPATIENT
Start: 2024-04-25 | End: 2024-04-25 | Stop reason: HOSPADM

## 2024-04-25 RX ORDER — LABETALOL HYDROCHLORIDE 5 MG/ML
5 INJECTION, SOLUTION INTRAVENOUS ONCE AS NEEDED
Status: DISCONTINUED | OUTPATIENT
Start: 2024-04-25 | End: 2024-04-25 | Stop reason: HOSPADM

## 2024-04-25 RX ORDER — FAMOTIDINE 10 MG/ML
20 INJECTION INTRAVENOUS ONCE
Status: COMPLETED | OUTPATIENT
Start: 2024-04-25 | End: 2024-04-25

## 2024-04-25 RX ORDER — LIDOCAINE HYDROCHLORIDE 10 MG/ML
0.1 INJECTION, SOLUTION EPIDURAL; INFILTRATION; INTRACAUDAL; PERINEURAL ONCE
Status: DISCONTINUED | OUTPATIENT
Start: 2024-04-25 | End: 2024-04-25 | Stop reason: HOSPADM

## 2024-04-25 RX ORDER — DIPHENHYDRAMINE HYDROCHLORIDE 50 MG/ML
12.5 INJECTION INTRAMUSCULAR; INTRAVENOUS ONCE AS NEEDED
Status: DISCONTINUED | OUTPATIENT
Start: 2024-04-25 | End: 2024-04-25 | Stop reason: HOSPADM

## 2024-04-25 RX ORDER — DROPERIDOL 2.5 MG/ML
0.62 INJECTION, SOLUTION INTRAMUSCULAR; INTRAVENOUS ONCE AS NEEDED
Status: DISCONTINUED | OUTPATIENT
Start: 2024-04-25 | End: 2024-04-25 | Stop reason: HOSPADM

## 2024-04-25 RX ORDER — ROCURONIUM BROMIDE 10 MG/ML
INJECTION, SOLUTION INTRAVENOUS AS NEEDED
Status: DISCONTINUED | OUTPATIENT
Start: 2024-04-25 | End: 2024-04-25

## 2024-04-25 RX ORDER — LABETALOL HYDROCHLORIDE 5 MG/ML
INJECTION, SOLUTION INTRAVENOUS AS NEEDED
Status: DISCONTINUED | OUTPATIENT
Start: 2024-04-25 | End: 2024-04-25

## 2024-04-25 RX ORDER — GLYCOPYRROLATE 0.2 MG/ML
INJECTION INTRAMUSCULAR; INTRAVENOUS AS NEEDED
Status: DISCONTINUED | OUTPATIENT
Start: 2024-04-25 | End: 2024-04-25

## 2024-04-25 RX ORDER — OXYCODONE AND ACETAMINOPHEN 5; 325 MG/1; MG/1
1 TABLET ORAL EVERY 4 HOURS PRN
Status: DISCONTINUED | OUTPATIENT
Start: 2024-04-25 | End: 2024-04-25 | Stop reason: HOSPADM

## 2024-04-25 RX ORDER — FENTANYL CITRATE 50 UG/ML
INJECTION, SOLUTION INTRAMUSCULAR; INTRAVENOUS AS NEEDED
Status: DISCONTINUED | OUTPATIENT
Start: 2024-04-25 | End: 2024-04-25

## 2024-04-25 RX ORDER — HYDRALAZINE HYDROCHLORIDE 20 MG/ML
5 INJECTION INTRAMUSCULAR; INTRAVENOUS EVERY 30 MIN PRN
Status: DISCONTINUED | OUTPATIENT
Start: 2024-04-25 | End: 2024-04-25 | Stop reason: HOSPADM

## 2024-04-25 RX ORDER — MEPERIDINE HYDROCHLORIDE 25 MG/ML
12.5 INJECTION INTRAMUSCULAR; INTRAVENOUS; SUBCUTANEOUS EVERY 10 MIN PRN
Status: DISCONTINUED | OUTPATIENT
Start: 2024-04-25 | End: 2024-04-25 | Stop reason: HOSPADM

## 2024-04-25 RX ORDER — PROPOFOL 10 MG/ML
INJECTION, EMULSION INTRAVENOUS AS NEEDED
Status: DISCONTINUED | OUTPATIENT
Start: 2024-04-25 | End: 2024-04-25

## 2024-04-25 RX ORDER — LIDOCAINE HYDROCHLORIDE 20 MG/ML
INJECTION, SOLUTION INFILTRATION; PERINEURAL AS NEEDED
Status: DISCONTINUED | OUTPATIENT
Start: 2024-04-25 | End: 2024-04-25

## 2024-04-25 RX ORDER — MORPHINE SULFATE 2 MG/ML
2 INJECTION, SOLUTION INTRAMUSCULAR; INTRAVENOUS EVERY 5 MIN PRN
Status: DISCONTINUED | OUTPATIENT
Start: 2024-04-25 | End: 2024-04-25 | Stop reason: HOSPADM

## 2024-04-25 RX ADMIN — LABETALOL HYDROCHLORIDE 5 MG: 5 INJECTION, SOLUTION INTRAVENOUS at 13:58

## 2024-04-25 RX ADMIN — PIPERACILLIN SODIUM AND TAZOBACTAM SODIUM 3.38 G: 3; .375 INJECTION, SOLUTION INTRAVENOUS at 13:10

## 2024-04-25 RX ADMIN — PIPERACILLIN SODIUM AND TAZOBACTAM SODIUM 3.38 G: 3; .375 INJECTION, SOLUTION INTRAVENOUS at 06:34

## 2024-04-25 RX ADMIN — PIPERACILLIN SODIUM AND TAZOBACTAM SODIUM 3.38 G: 3; .375 INJECTION, SOLUTION INTRAVENOUS at 00:43

## 2024-04-25 RX ADMIN — PROPOFOL 200 MG: 10 INJECTION, EMULSION INTRAVENOUS at 13:19

## 2024-04-25 RX ADMIN — ROCURONIUM BROMIDE 50 MG: 10 INJECTION, SOLUTION INTRAVENOUS at 13:19

## 2024-04-25 RX ADMIN — SODIUM CHLORIDE, SODIUM LACTATE, POTASSIUM CHLORIDE, CALCIUM CHLORIDE AND DEXTROSE MONOHYDRATE 125 ML/HR: 5; 600; 310; 30; 20 INJECTION, SOLUTION INTRAVENOUS at 04:52

## 2024-04-25 RX ADMIN — HYDROMORPHONE HYDROCHLORIDE 0.5 MG: 1 INJECTION, SOLUTION INTRAMUSCULAR; INTRAVENOUS; SUBCUTANEOUS at 16:14

## 2024-04-25 RX ADMIN — FAMOTIDINE 20 MG: 10 INJECTION, SOLUTION INTRAVENOUS at 12:14

## 2024-04-25 RX ADMIN — SODIUM CHLORIDE, POTASSIUM CHLORIDE, SODIUM LACTATE AND CALCIUM CHLORIDE 100 ML/HR: 600; 310; 30; 20 INJECTION, SOLUTION INTRAVENOUS at 12:15

## 2024-04-25 RX ADMIN — FENTANYL CITRATE 100 MCG: 50 INJECTION INTRAMUSCULAR; INTRAVENOUS at 13:30

## 2024-04-25 RX ADMIN — DILTIAZEM HYDROCHLORIDE 120 MG: 120 CAPSULE, COATED, EXTENDED RELEASE ORAL at 09:00

## 2024-04-25 RX ADMIN — ROCURONIUM BROMIDE 10 MG: 10 INJECTION, SOLUTION INTRAVENOUS at 14:58

## 2024-04-25 RX ADMIN — MORPHINE SULFATE 2 MG: 2 INJECTION, SOLUTION INTRAMUSCULAR; INTRAVENOUS at 19:31

## 2024-04-25 RX ADMIN — MORPHINE SULFATE 2 MG: 2 INJECTION, SOLUTION INTRAMUSCULAR; INTRAVENOUS at 23:00

## 2024-04-25 RX ADMIN — SUGAMMADEX 200 MG: 100 INJECTION, SOLUTION INTRAVENOUS at 16:09

## 2024-04-25 RX ADMIN — HYDROMORPHONE HYDROCHLORIDE 0.5 MG: 1 INJECTION, SOLUTION INTRAMUSCULAR; INTRAVENOUS; SUBCUTANEOUS at 16:10

## 2024-04-25 RX ADMIN — FENTANYL CITRATE 50 MCG: 50 INJECTION INTRAMUSCULAR; INTRAVENOUS at 13:10

## 2024-04-25 RX ADMIN — LABETALOL HYDROCHLORIDE 5 MG: 5 INJECTION, SOLUTION INTRAVENOUS at 13:52

## 2024-04-25 RX ADMIN — PANTOPRAZOLE SODIUM 40 MG: 40 INJECTION, POWDER, FOR SOLUTION INTRAVENOUS at 06:36

## 2024-04-25 RX ADMIN — PIPERACILLIN SODIUM AND TAZOBACTAM SODIUM 3.38 G: 3; .375 INJECTION, SOLUTION INTRAVENOUS at 17:53

## 2024-04-25 RX ADMIN — ONDANSETRON 4 MG: 2 INJECTION INTRAMUSCULAR; INTRAVENOUS at 16:06

## 2024-04-25 RX ADMIN — VANCOMYCIN HYDROCHLORIDE 1250 MG: 1.25 INJECTION, POWDER, LYOPHILIZED, FOR SOLUTION INTRAVENOUS at 10:30

## 2024-04-25 RX ADMIN — FENTANYL CITRATE 50 MCG: 50 INJECTION INTRAMUSCULAR; INTRAVENOUS at 13:18

## 2024-04-25 RX ADMIN — GLYCOPYRROLATE 0.2 MG: 0.2 INJECTION INTRAMUSCULAR; INTRAVENOUS at 15:11

## 2024-04-25 RX ADMIN — LIDOCAINE HYDROCHLORIDE 100 MG: 20 INJECTION, SOLUTION INFILTRATION; PERINEURAL at 13:19

## 2024-04-25 RX ADMIN — PIPERACILLIN SODIUM AND TAZOBACTAM SODIUM 3.38 G: 3; .375 INJECTION, SOLUTION INTRAVENOUS at 12:57

## 2024-04-25 RX ADMIN — ROCURONIUM BROMIDE 10 MG: 10 INJECTION, SOLUTION INTRAVENOUS at 15:11

## 2024-04-25 RX ADMIN — MIDAZOLAM 2 MG: 1 INJECTION INTRAMUSCULAR; INTRAVENOUS at 13:06

## 2024-04-25 SDOH — HEALTH STABILITY: MENTAL HEALTH: CURRENT SMOKER: 0

## 2024-04-25 ASSESSMENT — ENCOUNTER SYMPTOMS
SHORTNESS OF BREATH: 0
FLANK PAIN: 0
FEVER: 0
FATIGUE: 0
DIZZINESS: 0
BRUISES/BLEEDS EASILY: 0
FREQUENCY: 0
JOINT SWELLING: 0
COUGH: 0
PALPITATIONS: 0
APPETITE CHANGE: 0
DYSURIA: 0
TROUBLE SWALLOWING: 0
CHEST TIGHTNESS: 0
WEAKNESS: 0
NAUSEA: 0
BLOOD IN STOOL: 0
LIGHT-HEADEDNESS: 0
BACK PAIN: 0
DIAPHORESIS: 0
ABDOMINAL PAIN: 0
WOUND: 1
HEMATURIA: 0
HALLUCINATIONS: 0
CHILLS: 0
CONSTIPATION: 0
SORE THROAT: 0
DIARRHEA: 0
NUMBNESS: 0
FACIAL SWELLING: 0
EYE PAIN: 0
HEADACHES: 0
WHEEZING: 0
VOMITING: 0

## 2024-04-25 ASSESSMENT — COGNITIVE AND FUNCTIONAL STATUS - GENERAL
MOBILITY SCORE: 24
DAILY ACTIVITIY SCORE: 24
DAILY ACTIVITIY SCORE: 24
MOBILITY SCORE: 24

## 2024-04-25 ASSESSMENT — PAIN SCALES - GENERAL
PAINLEVEL_OUTOF10: 2
PAINLEVEL_OUTOF10: 0 - NO PAIN
PAINLEVEL_OUTOF10: 0 - NO PAIN
PAIN_LEVEL: 2

## 2024-04-25 NOTE — OP NOTE
Exploration Laparotomy small bowel resection with removal of mesh Operative Note     Date: 2024  OR Location: [facility] OR    Name: Rambo Daigle, : 1960, Age: 64 y.o., MRN: [id number], Sex: male    Diagnosis  Pre-op Diagnosis     * Infected hernioplasty mesh, initial encounter (CMS-HCC) [T85.79XA] Post-op Diagnosis     * Infected hernioplasty mesh, initial encounter (CMS-HCC) [T85.79XA]     Procedures  Exploration Laparotomy small bowel resection with removal of mesh  [address]882 - [place] EXPLORATORY LAPAROTOMY CELIOTOMY W/WO BIOPSY SPX      Surgeons      * Ty Torres - Primary    Resident/Fellow/Other Assistant:  Surgeons and Role:     * Anna Gillis MD - Assisting    Procedure Summary  Anesthesia: General  ASA: III  Anesthesia Staff: CRNA: PAYTON Hensley-CRNA  Estimated Blood Loss: 15mL  Intra-op Medications:   Administrations occurring from 1300 to 1530 on 24:   Medication Name Total Dose   piperacillin-tazobactam-dextrose (Zosyn) IV 3.375 g 50 mL              Anesthesia Record               Intraprocedure I/O Totals          Intake    piperacillin-tazobactam-dextrose (Zosyn) IV 3.375 g 50.00 mL    Total Intake 50 mL       Output    Urine 250 mL    Total Output 250 mL       Net    Net Volume -200 mL          Specimen:   ID Type Source Tests Collected by Time   1 : SMALL BOWEL RESECTION WITH MESH Tissue SMALL BOWEL/INTESTINE BIOPSY SURGICAL PATHOLOGY EXAM Ty Torres MD 2024 1441   2 : UMBILICUS Tissue UMBILICUS RESECTION SURGICAL PATHOLOGY EXAM Ty Torres MD 2024 1508        Staff:   Circulator: Mesha Rowley RN; Harini Prieto RN  Relief Circulator: Jemma Vernon RN  Scrub Person: Yadi Hendricks; Clarissa Corey; Ashley Steel         Drains and/or Catheters:   [REMOVED] Urethral Catheter Non-latex 16 Fr. (Removed)       Tourniquet Times:         Implants:     Findings:       Indications: Rambo Daigle is an 64 y.o. male who is having surgery for Infected hernioplasty mesh, initial encounter  (CMS-[facility]) [T85.79XA].       The patient was seen in the preoperative area. The risks, benefits, complications, treatment options, non-operative alternatives, expected recovery and outcomes were discussed with the patient. The possibilities of reaction to medication, pulmonary aspiration, injury to surrounding structures, bleeding, recurrent infection, the need for additional procedures, failure to diagnose a condition, and creating a complication requiring transfusion or operation were discussed with the patient. The patient concurred with the proposed plan, giving informed consent.  The site of surgery was properly noted/marked if necessary per policy. The patient has been actively warmed in preoperative area. Preoperative antibiotics have been ordered and given within 1 hours of incision. Venous thrombosis prophylaxis have been ordered including bilateral sequential compression devices    Procedure Details: Patient remotely had umbilical hernia repair with Ventralex patch.  He presented 48 hours ago with infected mesh with what appeared to be a small bowel fistula.  He is brought for resolution of the situation    Patient was brought to the operating room placed in supine position placed under general endotracheal anesthesia.  Poon catheter PAS stockings were placed.  Abdomen was prepped and draped in usual sterile fashion.  A midline incision was made carried down through the subcutaneous tissues to the fascia.  The fascia superior to the umbilicus and an area of noninflamed tissue was opened and the peritoneum was opened here this allowed entry into the peritoneal cavity and this incision was continued superior and inferior to just above the umbilicus where it became evident that there was small bowel adhesed to the mesh we repeated this procedure inferiorly coming up to just below the mesh inferiorly this allowed the hand to be placed in the abdomen and to palpate where the area of least adhesion was and this  was found to be on the right side hence the mesh was removed on the right side thus freeing the entire right abdominal wall from the mesh the umbilicus was then freed from the mesh which laid entirely adherent to the left abdominal wall at this point.  Once the mesh had been totally circumscribed and removed from the left side of the abdominal wall it was apparent that there was a loop of bowel that was adhesed to the mesh causing the problem.  This loop of bowel came down from the left side went down approximately  10 cm looped back up against the mesh again and then was free.  We transected the small bowel proximal and distal to the problem and then took the mesentery down with Lige sure.  The small bowel with the adhesed mesh was then taken off the field and all bowel anastomosis using MARK and TA was made crotch stitch was placed bowel clamps have been placed prior to the anastomosis once the MARK had been fired the stapled edges were viewed and there was no bleeding along the stapled edge after the TA was fired the mesenteric defect was closed with running silk and then succus was passed from proximal to the distal end.  Prior to anastomosis the small bowel had been run for its entirety there were no lesions.  Anastomosis was placed back into the abdominal cavity and the umbilicus was viewed it appeared of questionable viability was resected fascial edges were then freshened up and the fascia was closed with running double-stranded PDS.  Skin was closed with double tap staples with interspersed 2 inch iodoform gauze.  's Assistance was necessary due to the difficulty and complexity of the case and for assistance with decision making  Complications:  None; patient tolerated the procedure well.    Disposition: PACU - hemodynamically stable.  Condition: stable         Additional Details:       Attending Attestation: I was present and scrubbed for the entire procedure.    Ty Torres  Phone Number:  [phone]

## 2024-04-25 NOTE — PROGRESS NOTES
Vancomycin Dosing by Pharmacy- Cessation of Therapy    Consult to pharmacy for vancomycin dosing has been discontinued by the prescriber, pharmacy will sign off at this time.    Please call pharmacy if there are further questions or re-enter a consult if vancomycin is resumed.     Myriam De Anda, PharmD

## 2024-04-25 NOTE — PROGRESS NOTES
Rambo Daigle is a 64 y.o. male admitted for Infected hernioplasty mesh, initial encounter (CMS-[facility]). Pharmacy reviewed the patient's lbjdy-ca-vdwrvihss medications and allergies for accuracy.    The list below reflects the PTA list prior to pharmacy medication history. A summary a changes to the PTA medication list has been listed below. Please review each medication in order reconciliation for additional clarification and justification.    Source of information:  patient    Medications added:    Medications modified:    Medications to be removed:  Hydrochlorothiazide 25mg    Medications of concern:      Prior to Admission Medications   Prescriptions Last Dose Informant Patient Reported? Taking?   cholecalciferol (Vitamin D-3) 125 MCG (5000 UT) capsule   Yes No   Sig: Take 1 capsule (125 mcg) by mouth once daily.   dilTIAZem CD (Cardizem CD) 120 mg 24 hr capsule   Yes No   Sig: Take 1 capsule (120 mg) by mouth once daily.   furosemide (Lasix) 20 mg tablet   Yes No   Sig: Take 1 tablet (20 mg) by mouth once daily.   hydroCHLOROthiazide (HYDRODiuril) 25 mg tablet   Yes No   Sig: Take 1 tablet (25 mg) by mouth once daily.   rivaroxaban (Xarelto) 20 mg tablet   No No   Sig: Take 1 tablet (20 mg) by mouth once daily.      Facility-Administered Medications: None       Miri Pritchett

## 2024-04-25 NOTE — PROGRESS NOTES
Plan for discharge is at least 48 hours per PA. Pt is having surgery today as Xeralto has been held long enough o proceed. Pt may need IV ATB at discharge but it is not confirmed at this time. Plan is to return home at discharge. CT to follow. Светлана Umanzor BSN/RN-TCC

## 2024-04-25 NOTE — ANESTHESIA PREPROCEDURE EVALUATION
"Patient: Rambo Daigle    Procedure Information       Date/Time: 04/25/24 1300    Procedure: Exploration Laparotomy small bowel resection with removal of mesh    Location: [facility] OR 02 / Virtual [facility] OR    Surgeons: Ty Torres MD            Relevant Problems   Anesthesia (within normal limits)      Cardiac   (+) Atrial fibrillation (Multi)   (+) Benign essential hypertension   (+) Essential hypertriglyceridemia      Pulmonary   (+) NA (obstructive sleep apnea)   (+) Other emphysema (Multi)      Neuro   (+) Carpal tunnel syndrome of right wrist   (+) Carpal tunnel syndrome, bilateral   (+) Cervical radiculopathy   (+) Lumbar radiculopathy      GI   (+) GERD (gastroesophageal reflux disease)   (+) Hiatal hernia      Hematology   (+) Anemia      Musculoskeletal   (+) Carpal tunnel syndrome of right wrist   (+) Carpal tunnel syndrome, bilateral      HEENT   (+) Bilateral high frequency sensorineural hearing loss      ID   (+) Infected hernioplasty mesh, initial encounter (CMS-HCC)       Clinical information reviewed:   Tobacco  Allergies  Meds   Med Hx  Surg Hx   Fam Hx  Soc Hx        NPO Detail:  NPO/Void Status  Date of Last Liquid: 04/25/24  Date of Last Solid: 04/23/24         Physical Exam    Airway  Mallampati: II  TM distance: >3 FB  Neck ROM: full     Cardiovascular   Rhythm: irregular  Rate: abnormal     Dental - normal exam  Comments: #8  \"patch\" to anterior surface of tooth. Has had replaced multiple times in the past per patient   Pulmonary - normal exam     Abdominal - normal exam           Anesthesia Plan    History of general anesthesia?: yes  History of complications of general anesthesia?: no    ASA 3     general     The patient is not a current smoker.    intravenous induction   Postoperative administration of opioids is intended.  Anesthetic plan and risks discussed with patient.  Use of blood products discussed with patient who consented to blood products.    Plan discussed with CRNA.      "

## 2024-04-25 NOTE — INTERVAL H&P NOTE
H&P reviewed. The patient was examined and there are no changes to the H&P other than his area of erythema is decreased from admission

## 2024-04-25 NOTE — ANESTHESIA POSTPROCEDURE EVALUATION
Patient: Rambo Daigle    Procedure Summary       Date: 04/25/24 Room / Location: [facility] OR 02 / Virtual [facility] OR    Anesthesia Start: 1304 Anesthesia Stop: 1627    Procedure: Exploration Laparotomy small bowel resection with removal of mesh Diagnosis:       Infected hernioplasty mesh, initial encounter (CMS-[facility])      (Infected hernioplasty mesh, initial encounter (CMS-HCC) [T85.79XA])    Surgeons: Ty Torres MD Responsible Provider: JAMES Hensley    Anesthesia Type: general ASA Status: 3            Anesthesia Type: general    Vitals Value Taken Time   /90 04/25/24 1700   Temp 37.1 °C (98.7 °F) 04/25/24 1622   Pulse 73 04/25/24 1700   Resp 17 04/25/24 1700   SpO2 94 % 04/25/24 1700       Anesthesia Post Evaluation    Patient location during evaluation: PACU  Patient participation: complete - patient participated  Level of consciousness: awake  Pain score: 2  Pain management: adequate  Airway patency: patent  Cardiovascular status: acceptable  Respiratory status: acceptable  Hydration status: acceptable  Postoperative Nausea and Vomiting: none    No notable events documented.

## 2024-04-25 NOTE — PROGRESS NOTES
"Vancomycin Dosing by Pharmacy- FOLLOW UP    Rambo Daigle is a 64 y.o. year old male who Pharmacy has been consulted for vancomycin dosing for Vancomycin Indications: Skin & Soft Tissue. Based on the patient's indication and renal status this patient will be dosed based on a goal AUC of 400-600.     Renal function is currently stable.    Current vancomycin dose:  1500 mg every 12 hours    Estimated vancomycin AUC on current dose: 619 mg/L.hr     Visit Vitals  /85 (BP Location: Right arm, Patient Position: Lying)   Pulse 62   Temp 36.6 °C (97.8 °F) (Temporal)   Resp 19           Lab Results   Component Value Date    CREATININE 1.05 04/25/2024    CREATININE 1.05 04/23/2024    CREATININE 1.00 01/19/2024    CREATININE 0.96 06/09/2023    CREATININE 0.99 01/25/2023    CREATININE 0.94 07/15/2022    CREATININE 1.00 03/16/2022       Patient weight is No results found for: \"PTWEIGHT\"    No results found for: \"CULTURE\"    I/O last 3 completed shifts:  In: 3075 (20.1 mL/kg) [P.O.:120; I.V.:2155 (14.1 mL/kg); IV Piggyback:800]  Out: 2800 (18.3 mL/kg) [Urine:2800 (0.5 mL/kg/hr)]  Weight: 152.8 kg     No results found for: \"PATIENTTEMP\"       Assessment/Plan     Above goal AUC. Orders placed for new vancomcyin regimen of 1250 every 12 hours to begin at 0900.    This dosing regimen is predicted by [employer] to result in the following pharmacokinetic parameters:  Loading dose: N/A  Regimen: 1250 mg IV every 12 hours.  Start time: 11:14 on 04/25/2024  Exposure target: AUC24 (range)400-600 mg/L.hr   AUC24,ss: 519 mg/L.hr  Probability of AUC24 > 400: 91 %  Ctrough,ss: 17.1 mg/L  Probability of Ctrough,ss > 20: 28 %  Probability of nephrotoxicity (Lodise COURTNEY 2009): 13 %    The next level will be obtained on 4/29 at 0500. May be obtained sooner if clinically indicated.   Will continue to monitor renal function daily while on vancomycin and order serum creatinine at least every 48 hours if not already ordered.  Follow for continued " vancomycin needs, clinical response, and signs/symptoms of toxicity.     Richard EscotoD, BCPS

## 2024-04-25 NOTE — ANESTHESIA PROCEDURE NOTES
Airway  Date/Time: 4/25/2024 1:20 PM  Urgency: elective    Airway not difficult    Staffing  Performed: CRNA   Authorized by: JAMES Hensley    Performed by: JAMES Hensley  Patient location during procedure: OR    Indications and Patient Condition  Indications for airway management: anesthesia  Spontaneous ventilation: present  Sedation level: deep  Preoxygenated: yes  Patient position: sniffing  Mask difficulty assessment: 1 - vent by mask    Final Airway Details  Final airway type: endotracheal airway      Successful airway: ETT  Cuffed: yes   Successful intubation technique: video laryngoscopy  Facilitating devices/methods: intubating stylet  Endotracheal tube insertion site: oral  Blade: Shun ([place])  Blade size: #4  ETT size (mm): 8.0  Cormack-Lehane Classification: grade IIa - partial view of glottis  Placement verified by: chest auscultation and capnometry   Cuff volume (mL): 7  Measured from: lips  ETT to lips (cm): 23  Number of attempts at approach: 1

## 2024-04-25 NOTE — PROGRESS NOTES
Rambo Daigle is a 64 y.o. male on day 2 of admission presenting with Infected hernioplasty mesh, initial encounter ([facility]).      Subjective   Rambo Daigle is a 64 y.o. male with Afib, HFpEF, HTN, venous insufficiency, COPD, NA, GERD, lumbar radiculopathy, obesity, umbilical hernia s/p repair presented to ED for abdominal pain. 1 week of swelling and pain around umbilicus. redness and bloody drainage. no f/c. no other recent sx. exercise tolerance fair, able to walk >1 mi and 1-2 flights of stairs. no orthopnea or exertional cp. VSS. umbilical erythema on exam. labs/imaging all wnl. ekg w/ afib, anterior q-waves. ct a/p w/ abscess and potential enterocutaneous fistula surrounding surgical mesh. s/p vanc, zosyn, protonix. admitted to surgery.    RCRI class IV, which carries an 11% risk of major CV complications, though patient able to achieve > 4 METS of activity routinely. Patient's other comorbid conditions are stable. Therefore there are no medical contraindications currently to surgery     04/25/24: No acute events overnight. Vitals stable. Labs largely unremarkable.  No Leukocytosis, does have slight anemia. General surgery planning to take to OR today. Patients only complaint is that he is hungry          Review of Systems   Constitutional:  Negative for appetite change, chills, diaphoresis, fatigue and fever.   HENT:  Negative for congestion, ear pain, facial swelling, hearing loss, nosebleeds, sore throat, tinnitus and trouble swallowing.    Eyes:  Negative for pain.   Respiratory:  Negative for cough, chest tightness, shortness of breath and wheezing.    Cardiovascular:  Negative for chest pain, palpitations and leg swelling.   Gastrointestinal:  Negative for abdominal pain, blood in stool, constipation, diarrhea, nausea and vomiting.   Genitourinary:  Negative for dysuria, flank pain, frequency, hematuria and urgency.   Musculoskeletal:  Negative for back pain and joint swelling.   Skin:  Positive for  rash and wound.   Neurological:  Negative for dizziness, syncope, weakness, light-headedness, numbness and headaches.   Hematological:  Does not bruise/bleed easily.   Psychiatric/Behavioral:  Negative for behavioral problems, hallucinations and suicidal ideas.           Objective     Last Recorded Vitals  BP (!) 149/91   Pulse 72   Temp 36.1 °C (97 °F) (Temporal)   Resp 13   Wt (!) 153 kg (336 lb 12.8 oz)   SpO2 99%     Image Results  CT abdomen pelvis w IV contrast    Result Date: 4/23/2024  Interpreted By:  Rambo Higginbotham, STUDY: CT ABDOMEN PELVIS W IV CONTRAST;  4/23/2024 2:01 pm   INDICATION: Signs/Symptoms:raf-umbilical redness and drainage.   COMPARISON: None.   ACCESSION NUMBER(S): [id number]   ORDERING CLINICIAN: SHELLY LOERA   TECHNIQUE: CT of the abdomen and pelvis from the lung bases through the symphysis pubis after the uneventful administration of intravenous contrast (100 mL Omnipaque 350). No oral contrast.   FINDINGS: LOWER CHEST: No acute airspace disease.   BONES: No acute skeletal findings.   LIVER: Slightly lobular in contour but not overtly cirrhotic. Not overtly fatty. Not enlarged. All vessels patent. No mass   SPLEEN: Normal. No enlargement, mass or evidence of splenic vein thrombosis.   PANCREAS: Normal. No CT evidence of acute or chronic pancreatitis. No duct dilation. No mass.   GALLBLADDER: Normal CT appearance. No dilation, calcified, or gas-containing stones.  Other types of gallstones could be occult on CT and detectable only by ultrasound.   BILE DUCTS: Normal. No biliary duct dilation.   ADRENAL GLANDS: Normal. No nodule or mass.   KIDNEYS AND URETERS: Normal except for the 16 mm simple cyst extending medially from the left kidney between the poles. No hydronephrosis on either side.  No mass.  Symmetric enhancement.  No infarct or CT evidence of acute pyelonephritis.  No substantial radiodense stone.  Tiny stones and radiolucent stones could be occult on CT. Note distal  ureters obscured on both sides due to streak artifact from hip prostheses on both sides   LYMPH NODES: No adenopathy, intraperitoneal, retroperitoneal, pelvic or otherwise   APPENDIX: Normal.  Not dilated, thick walled or in any other way inflamed in appearance.  No inflammatory change about the appendix.   COLON: Normal. No sign of acute diverticulitis or other colitis. No annular constricting mass.   SMALL BOWEL: Refer to the abdominal wall section, below   STOMACH / DUODENUM: Grossly normal by CT which has limited sensitivity and specificity for the stomach and duodenum.   RETROPERITONEUM: Normal.  No acute hemorrhage or inflammatory change. Lymph nodes in a separate dedicated section.   OMENTUM, MESENTERY AND PERITONEAL SPACES: Free intraperitoneal air: Negative Free intraperitoneal fluid: Negative Abscess: Negative Other: n/a   URINARY BLADDER: Mostly obscured by streak artifact   PELVIS: Most of prostate and seminal vesicles obscured by streak artifact   VASCULATURE: Scattered atherosclerotic calcifications on normal caliber abdominal aorta   ABDOMINAL WALL:   Mesh repair material is directly deep to the umbilical tract. No active/untreated hernia   Gas bubbles I have annotated on axial images 102 and 105 may be part of unformed extraluminal reactive fluid or less likely in part of a bowel loop that has slipped ventral/superficial to the left lateral margin of the hernia mesh   Another gas bubble on axial 107 (not annotated) is far removed from any possible small bowel involvement. It is part of an approximately 3 cm transverse, 1.7 cm anteroposterior, 1.8 cm craniocaudal fluid collection that extends from the ventral margin of the hernia mass up to the skin   Additional phlegmonous tissue suspected along the umbilical tract, leading up to the skin surface where there is extensive skin thickening along the umbilical tract with infiltration of the normal fat density of the subcutaneous fat deep to the skin all  suggestive of cellulitis       Cellulitis around the umbilicus   Along the umbilical tract deeper from the skin, probable 3 x 1.8 x 1.7 cm fluid abscess   The gas bubble I have annotated on axial image 105 and another on axial 108 are superficial enough they could theoretically have been pushed into the umbilical tract from outside   On the other hand, a cluster of gas bubbles I have annotated on axial 102 are deep enough to be immediately superficial to the mesh hernia repair material. These bubbles are more alarming that there may be a developing sinus tract from the umbilicus into the peritoneal space, even potentially a burgeoning enterocutaneous fistula. Note one or two segments of small bowel passed directly deep to and adjacent to the left lateral margin of the hernia mesh where the deeper gas bubbles extend. Perhaps the gas bubbles on axial 102 are from a partially collapsed short segment of small bowel that has passed ventral to the mesh   Note in the subcutaneous fat to the right of at, and just above the umbilical cellulitis, there are two subcutaneous foreign bodies, one approximately 18 mm long on sagittal 97, the other approximately 11 mm long on sagittal 95/96. The curvilinear shape suggests they could be suture needles, although the attenuation is fairly low. Note also the round, much higher density foreign body in the subcutaneous fat to the left of and well below the umbilicus on axial image 120; this one resembles a ballistic fragment or BB   No other acute findings   MACRO: None   Signed by: Rambo Higginbotham 4/23/2024 2:42 PM Dictation workstation:   [id number]       Lab Results  Results for orders placed or performed during the hospital encounter of 04/23/24 (from the past 24 hour(s))   Vancomycin   Result Value Ref Range    Vancomycin 20.0 5.0 - 20.0 ug/mL   CBC   Result Value Ref Range    WBC 5.4 4.4 - 11.3 x10*3/uL    nRBC 0.0 0.0 - 0.0 /100 WBCs    RBC 3.98 (L) 4.50 - 5.90 x10*6/uL     Hemoglobin 12.6 (L) 13.5 - 17.5 g/dL    Hematocrit 37.4 (L) 41.0 - 52.0 %    MCV 94 80 - 100 fL    MCH 31.7 26.0 - 34.0 pg    MCHC 33.7 32.0 - 36.0 g/dL    RDW 13.1 11.5 - 14.5 %    Platelets 241 150 - 450 x10*3/uL   Basic metabolic panel   Result Value Ref Range    Glucose 104 (H) 74 - 99 mg/dL    Sodium 139 136 - 145 mmol/L    Potassium 3.8 3.5 - 5.3 mmol/L    Chloride 105 98 - 107 mmol/L    Bicarbonate 27 21 - 32 mmol/L    Anion Gap 11 10 - 20 mmol/L    Urea Nitrogen 11 6 - 23 mg/dL    Creatinine 1.05 0.50 - 1.30 mg/dL    eGFR 78 >60 mL/min/1.73m*2    Calcium 8.7 8.6 - 10.3 mg/dL   Magnesium   Result Value Ref Range    Magnesium 1.94 1.60 - 2.40 mg/dL   Phosphorus   Result Value Ref Range    Phosphorus 3.5 2.5 - 4.9 mg/dL        Medications  Scheduled medications:  [Transfer Hold] dilTIAZem CD, 120 mg, oral, Daily  [Transfer Hold] enoxaparin, 40 mg, subcutaneous, q12h ABRIL  [Held by provider] furosemide, 20 mg, oral, Daily  [Transfer Hold] pantoprazole, 40 mg, oral, Daily before breakfast   Or  [Transfer Hold] pantoprazole, 40 mg, intravenous, Daily before breakfast  [Transfer Hold] piperacillin-tazobactam, 3.375 g, intravenous, q6h  [Transfer Hold] pneumococcal conjugate, 0.5 mL, intramuscular, During hospitalization  [Transfer Hold] polyethylene glycol, 17 g, oral, Daily  [Held by provider] rivaroxaban, 20 mg, oral, Daily  [Transfer Hold] vancomycin, 1,250 mg, intravenous, q12h      Continuous medications:  dextrose 5 % and lactated Ringer's, 125 mL/hr, Last Rate: 125 mL/hr (04/25/24 1122)  lactated Ringer's, 100 mL/hr, Last Rate: 100 mL/hr (04/25/24 1215)      PRN medications:  PRN medications: [Transfer Hold] acetaminophen **OR** [Transfer Hold] acetaminophen **OR** [Transfer Hold] acetaminophen, [Transfer Hold] benzocaine-menthol, [Transfer Hold] bisacodyl, [Transfer Hold] ipratropium-albuteroL, [Transfer Hold] ondansetron **OR** [Transfer Hold] ondansetron, [Transfer Hold] promethazine **OR** [Transfer Hold]  promethazine, [Transfer Hold] vancomycin     Physical Exam  Constitutional:       General: He is not in acute distress.     Appearance: Normal appearance. He is obese.   HENT:      Head: Normocephalic and atraumatic.      Right Ear: External ear normal.      Left Ear: External ear normal.      Nose: Nose normal.      Mouth/Throat:      Mouth: Mucous membranes are moist.      Pharynx: Oropharynx is clear.   Eyes:      Extraocular Movements: Extraocular movements intact.      Conjunctiva/sclera: Conjunctivae normal.      Pupils: Pupils are equal, round, and reactive to light.   Cardiovascular:      Rate and Rhythm: Normal rate. Rhythm irregular.      Pulses: Normal pulses.      Heart sounds: Normal heart sounds.   Pulmonary:      Effort: Pulmonary effort is normal. No respiratory distress.      Breath sounds: Normal breath sounds. No wheezing, rhonchi or rales.   Abdominal:      General: Bowel sounds are normal.      Palpations: Abdomen is soft.      Tenderness: There is no abdominal tenderness. There is no right CVA tenderness, left CVA tenderness, guarding or rebound.      Comments: Non-distended. Induration and erythema at the umbilicus with some purulent drainage, decrease in erythema from previous outline, mild tenderness in the area. Abdomen otherwise non-tender. No guarding.     Musculoskeletal:         General: No swelling. Normal range of motion.      Cervical back: Normal range of motion and neck supple.   Skin:     General: Skin is warm and dry.      Capillary Refill: Capillary refill takes less than 2 seconds.      Findings: Erythema present. No lesion or rash.   Neurological:      General: No focal deficit present.      Mental Status: He is alert and oriented to person, place, and time. Mental status is at baseline.   Psychiatric:         Mood and Affect: Mood normal.         Behavior: Behavior normal.                  Code Status  Full Code     Assessment/Plan      Hx umbilical hernia s/p repair with  concern for surgical mesh infection  By 4/23 ct a/p. w/ abscess, ?enterocutaneous fistula. no sepsis   Agree with current abx per surgery  Moderate-risk surgery, RCRI class IV, which carries an 11% risk of major CV complications, though patient able to achieve > 4 METS of activity routinely. Patient's other comorbid conditions are stable. Therefore there are no medical contraindications currently to surgery      Afib  Stable; permanent, non-valvular, cv 3  Home dilt; xarelto held (no need to bridge)    HFpEF  Compensated; diuretics being held per surgery    HTN  Stable  Home dilt; diuretics being held per surgery    Venous insufficiency  Stable    COPD  Stable off tx, no acute exacerbation   Albuterol prn    NA  Home cpap HS    GERD  Stable off tx    Lumbar radiculopathy  Stable; tylenol prn    Obesity  BMI 39. outpt pcp mgt    DVT ppx: Lovenox, resume Xarelto when safe to do so from priamry perspective       Please see orders for more complete plan    Priti Rcio PA-C

## 2024-04-25 NOTE — CARE PLAN
Problem: Pain  Goal: My pain/discomfort is manageable  Outcome: Progressing     Problem: Daily Care  Goal: Daily care needs are met  Outcome: Progressing     Problem: Discharge Barriers  Goal: My discharge needs are met  Outcome: Progressing     Problem: Safety  Goal: Patient will be injury free during hospitalization  Outcome: Progressing  Goal: I will remain free of falls  Outcome: Progressing     Problem: Psychosocial Needs  Goal: Demonstrates ability to cope with hospitalization/illness  Outcome: Progressing  Goal: Collaborate with me, my family, and caregiver to identify my specific goals  Outcome: Progressing     Problem: Pain  Goal: Takes deep breaths with improved pain control throughout the shift  Outcome: Progressing  Goal: Turns in bed with improved pain control throughout the shift  Outcome: Progressing  Goal: Walks with improved pain control throughout the shift  Outcome: Progressing  Goal: Performs ADL's with improved pain control throughout shift  Outcome: Progressing  Goal: Participates in PT with improved pain control throughout the shift  Outcome: Progressing  Goal: Free from opioid side effects throughout the shift  Outcome: Progressing  Goal: Free from acute confusion related to pain meds throughout the shift  Outcome: Progressing   The patient's goals for the shift include      The clinical goals for the shift include Pt will rate pain a 4 or less by end of this shift.

## 2024-04-26 LAB
ANION GAP SERPL CALC-SCNC: 10 MMOL/L
BUN SERPL-MCNC: 8 MG/DL (ref 6–23)
CALCIUM SERPL-MCNC: 7.6 MG/DL (ref 8.6–10.3)
CHLORIDE SERPL-SCNC: 108 MMOL/L (ref 98–107)
CO2 SERPL-SCNC: 25 MMOL/L (ref 21–32)
CREAT SERPL-MCNC: 0.91 MG/DL (ref 0.5–1.3)
EGFRCR SERPLBLD CKD-EPI 2021: >90 ML/MIN/1.73M*2
ERYTHROCYTE [DISTWIDTH] IN BLOOD BY AUTOMATED COUNT: 13.2 % (ref 11.5–14.5)
GLUCOSE SERPL-MCNC: 101 MG/DL (ref 74–99)
HCT VFR BLD AUTO: 38.5 % (ref 41–52)
HGB BLD-MCNC: 12.4 G/DL (ref 13.5–17.5)
MAGNESIUM SERPL-MCNC: 1.67 MG/DL (ref 1.6–2.4)
MCH RBC QN AUTO: 31 PG (ref 26–34)
MCHC RBC AUTO-ENTMCNC: 32.2 G/DL (ref 32–36)
MCV RBC AUTO: 96 FL (ref 80–100)
NRBC BLD-RTO: 0 /100 WBCS (ref 0–0)
PHOSPHATE SERPL-MCNC: 2.9 MG/DL (ref 2.5–4.9)
PLATELET # BLD AUTO: 265 X10*3/UL (ref 150–450)
POTASSIUM SERPL-SCNC: 3.4 MMOL/L (ref 3.5–5.3)
RBC # BLD AUTO: 4 X10*6/UL (ref 4.5–5.9)
SODIUM SERPL-SCNC: 140 MMOL/L (ref 136–145)
WBC # BLD AUTO: 8.2 X10*3/UL (ref 4.4–11.3)

## 2024-04-26 PROCEDURE — 80048 BASIC METABOLIC PNL TOTAL CA: CPT | Performed by: SURGERY

## 2024-04-26 PROCEDURE — 85027 COMPLETE CBC AUTOMATED: CPT | Performed by: SURGERY

## 2024-04-26 PROCEDURE — 2500000004 HC RX 250 GENERAL PHARMACY W/ HCPCS (ALT 636 FOR OP/ED): Performed by: SURGERY

## 2024-04-26 PROCEDURE — 5A09357 ASSISTANCE WITH RESPIRATORY VENTILATION, LESS THAN 24 CONSECUTIVE HOURS, CONTINUOUS POSITIVE AIRWAY PRESSURE: ICD-10-PCS | Performed by: SURGERY

## 2024-04-26 PROCEDURE — 83735 ASSAY OF MAGNESIUM: CPT | Performed by: SURGERY

## 2024-04-26 PROCEDURE — 36415 COLL VENOUS BLD VENIPUNCTURE: CPT | Performed by: SURGERY

## 2024-04-26 PROCEDURE — 99233 SBSQ HOSP IP/OBS HIGH 50: CPT | Performed by: PHYSICIAN ASSISTANT

## 2024-04-26 PROCEDURE — 2500000001 HC RX 250 WO HCPCS SELF ADMINISTERED DRUGS (ALT 637 FOR MEDICARE OP): Performed by: SURGERY

## 2024-04-26 PROCEDURE — 1100000001 HC PRIVATE ROOM DAILY

## 2024-04-26 PROCEDURE — 84100 ASSAY OF PHOSPHORUS: CPT | Performed by: SURGERY

## 2024-04-26 PROCEDURE — C9113 INJ PANTOPRAZOLE SODIUM, VIA: HCPCS | Performed by: SURGERY

## 2024-04-26 PROCEDURE — 2500000004 HC RX 250 GENERAL PHARMACY W/ HCPCS (ALT 636 FOR OP/ED): Performed by: STUDENT IN AN ORGANIZED HEALTH CARE EDUCATION/TRAINING PROGRAM

## 2024-04-26 PROCEDURE — 2500000006 HC RX 250 W HCPCS SELF ADMINISTERED DRUGS (ALT 637 FOR ALL PAYERS): Mod: MUE | Performed by: SURGERY

## 2024-04-26 RX ORDER — MAGNESIUM SULFATE HEPTAHYDRATE 40 MG/ML
2 INJECTION, SOLUTION INTRAVENOUS ONCE
Status: COMPLETED | OUTPATIENT
Start: 2024-04-26 | End: 2024-04-26

## 2024-04-26 RX ORDER — POTASSIUM CHLORIDE 14.9 MG/ML
20 INJECTION INTRAVENOUS
Status: COMPLETED | OUTPATIENT
Start: 2024-04-26 | End: 2024-04-26

## 2024-04-26 RX ORDER — HYDRALAZINE HYDROCHLORIDE 25 MG/1
25 TABLET, FILM COATED ORAL EVERY 8 HOURS PRN
Status: DISCONTINUED | OUTPATIENT
Start: 2024-04-26 | End: 2024-05-04 | Stop reason: HOSPADM

## 2024-04-26 RX ADMIN — PIPERACILLIN SODIUM AND TAZOBACTAM SODIUM 3.38 G: 3; .375 INJECTION, SOLUTION INTRAVENOUS at 18:49

## 2024-04-26 RX ADMIN — PIPERACILLIN SODIUM AND TAZOBACTAM SODIUM 3.38 G: 3; .375 INJECTION, SOLUTION INTRAVENOUS at 14:08

## 2024-04-26 RX ADMIN — HYDROMORPHONE HYDROCHLORIDE 0.2 MG: 0.2 INJECTION, SOLUTION INTRAMUSCULAR; INTRAVENOUS; SUBCUTANEOUS at 05:33

## 2024-04-26 RX ADMIN — POTASSIUM CHLORIDE 20 MEQ: 14.9 INJECTION, SOLUTION INTRAVENOUS at 10:43

## 2024-04-26 RX ADMIN — MAGNESIUM SULFATE HEPTAHYDRATE 2 G: 40 INJECTION, SOLUTION INTRAVENOUS at 10:27

## 2024-04-26 RX ADMIN — RIVAROXABAN 20 MG: 20 TABLET, FILM COATED ORAL at 14:12

## 2024-04-26 RX ADMIN — PIPERACILLIN SODIUM AND TAZOBACTAM SODIUM 3.38 G: 3; .375 INJECTION, SOLUTION INTRAVENOUS at 05:36

## 2024-04-26 RX ADMIN — HYDROMORPHONE HYDROCHLORIDE 0.2 MG: 0.2 INJECTION, SOLUTION INTRAMUSCULAR; INTRAVENOUS; SUBCUTANEOUS at 09:34

## 2024-04-26 RX ADMIN — DILTIAZEM HYDROCHLORIDE 120 MG: 120 CAPSULE, COATED, EXTENDED RELEASE ORAL at 08:53

## 2024-04-26 RX ADMIN — ACETAMINOPHEN 650 MG: 325 TABLET ORAL at 02:14

## 2024-04-26 RX ADMIN — PIPERACILLIN SODIUM AND TAZOBACTAM SODIUM 3.38 G: 3; .375 INJECTION, SOLUTION INTRAVENOUS at 00:15

## 2024-04-26 RX ADMIN — PANTOPRAZOLE SODIUM 40 MG: 40 INJECTION, POWDER, FOR SOLUTION INTRAVENOUS at 05:34

## 2024-04-26 RX ADMIN — HYDROMORPHONE HYDROCHLORIDE 0.2 MG: 0.2 INJECTION, SOLUTION INTRAMUSCULAR; INTRAVENOUS; SUBCUTANEOUS at 14:11

## 2024-04-26 RX ADMIN — POTASSIUM CHLORIDE 20 MEQ: 14.9 INJECTION, SOLUTION INTRAVENOUS at 13:21

## 2024-04-26 RX ADMIN — HYDROMORPHONE HYDROCHLORIDE 0.2 MG: 0.2 INJECTION, SOLUTION INTRAMUSCULAR; INTRAVENOUS; SUBCUTANEOUS at 20:15

## 2024-04-26 ASSESSMENT — ENCOUNTER SYMPTOMS
APPETITE CHANGE: 0
PALPITATIONS: 0
DIZZINESS: 0
FLANK PAIN: 0
SHORTNESS OF BREATH: 0
JOINT SWELLING: 0
TROUBLE SWALLOWING: 0
LIGHT-HEADEDNESS: 0
NAUSEA: 0
CHILLS: 0
FEVER: 0
VOMITING: 0
CONSTIPATION: 0
DIAPHORESIS: 0
BLOOD IN STOOL: 0
DYSURIA: 0
WOUND: 1
ABDOMINAL PAIN: 0
WEAKNESS: 0
ABDOMINAL PAIN: 1
WHEEZING: 0
CHEST TIGHTNESS: 0
HALLUCINATIONS: 0
FREQUENCY: 0
FACIAL SWELLING: 0
HEMATURIA: 0
DIFFICULTY URINATING: 0
BACK PAIN: 0
COUGH: 0
BRUISES/BLEEDS EASILY: 0
DIARRHEA: 0
NUMBNESS: 0
EYE PAIN: 0
HEADACHES: 0
SORE THROAT: 0
FATIGUE: 0

## 2024-04-26 ASSESSMENT — COGNITIVE AND FUNCTIONAL STATUS - GENERAL
MOBILITY SCORE: 24
DAILY ACTIVITIY SCORE: 24

## 2024-04-26 ASSESSMENT — PAIN - FUNCTIONAL ASSESSMENT
PAIN_FUNCTIONAL_ASSESSMENT: 0-10

## 2024-04-26 ASSESSMENT — PAIN SCALES - GENERAL
PAINLEVEL_OUTOF10: 4
PAINLEVEL_OUTOF10: 8
PAINLEVEL_OUTOF10: 7
PAINLEVEL_OUTOF10: 4
PAINLEVEL_OUTOF10: 4
PAINLEVEL_OUTOF10: 8

## 2024-04-26 ASSESSMENT — PAIN DESCRIPTION - ORIENTATION
ORIENTATION: MID
ORIENTATION: MID

## 2024-04-26 ASSESSMENT — PAIN DESCRIPTION - LOCATION
LOCATION: ABDOMEN
LOCATION: ABDOMEN

## 2024-04-26 ASSESSMENT — ACTIVITIES OF DAILY LIVING (ADL): LACK_OF_TRANSPORTATION: NO

## 2024-04-26 NOTE — PROGRESS NOTES
04/26/24 1130   Discharge Planning   Living Arrangements Spouse/significant other   Support Systems Spouse/significant other;Children   Assistance Needed None   Type of Residence Private residence   Number of Stairs to Enter Residence 3   Number of Stairs Within Residence 13   Do you have animals or pets at home? Yes   Type of Animals or Pets 1 dog 2 cats   Who is requesting discharge planning? Provider   Home or Post Acute Services None   Patient expects to be discharged to: Home   Does the patient need discharge transport arranged? No   Financial Resource Strain   How hard is it for you to pay for the very basics like food, housing, medical care, and heating? Not hard   Housing Stability   In the last 12 months, was there a time when you were not able to pay the mortgage or rent on time? N   In the last 12 months, how many places have you lived? 1   In the last 12 months, was there a time when you did not have a steady place to sleep or slept in a shelter (including now)? N   Transportation Needs   In the past 12 months, has lack of transportation kept you from medical appointments or from getting medications? no   In the past 12 months, has lack of transportation kept you from meetings, work, or from getting things needed for daily living? No     Spoke with pt via phone due to working remote and verified address, phone number and emergency contact information. PCP is  Dr Torres last seen in February this year. Preferred pharmacy is [facility], denies issues obtaining or affording his medications and he takes them as ordered. Pt has and wears a C-Pap. Pt is independent,  lives at home with his wife and feels safe. Discharge plan is home no needs and he has a ride. Follow for possible dressing change or IV ATB needs. Wife will drive home at discharge. [facility] 24/24. ADOD is 4/27. CT to follow. Светлана Umanzor BSN/RN-[facility]

## 2024-04-26 NOTE — PROGRESS NOTES
Social work consult placed for positive medical risk screen. Per chart review SDOH list pt as alcohol misuser, met with pt to assess needs and provide support, accompanied by CAROLINA WILSON. Pt's daughter was present and pt was agreeable to her remaining in room for conversation, Explored pt's thoughts toward drinking behaviors, awareness, and willingness for tx. Pt did not identity any concerns with his drinking behaviors and does not feel it is an issue at this time, daughter did not identify any concerns at this time as well. Both denied any other areas of support at this time, appreciative of SW checking in. No other needs identified at this time, SW signing off,  pt and care team aware of SW availability while inpt.     Marcio Sevilla, MSW, LSW ([id number])   Care Transitions

## 2024-04-26 NOTE — CARE PLAN
The patient's goals for the shift include      The clinical goals for the shift include patient will remain free of falls and injury throughout shift      Problem: Pain  Goal: My pain/discomfort is manageable  Outcome: Progressing     Problem: Daily Care  Goal: Daily care needs are met  Outcome: Progressing     Problem: Discharge Barriers  Goal: My discharge needs are met  Outcome: Progressing     Problem: Safety  Goal: Patient will be injury free during hospitalization  Outcome: Progressing  Goal: I will remain free of falls  Outcome: Progressing     Problem: Psychosocial Needs  Goal: Demonstrates ability to cope with hospitalization/illness  Outcome: Progressing  Goal: Collaborate with me, my family, and caregiver to identify my specific goals  Outcome: Progressing     Problem: Pain  Goal: Takes deep breaths with improved pain control throughout the shift  Outcome: Progressing  Goal: Turns in bed with improved pain control throughout the shift  Outcome: Progressing  Goal: Walks with improved pain control throughout the shift  Outcome: Progressing  Goal: Performs ADL's with improved pain control throughout shift  Outcome: Progressing  Goal: Participates in PT with improved pain control throughout the shift  Outcome: Progressing  Goal: Free from opioid side effects throughout the shift  Outcome: Progressing  Goal: Free from acute confusion related to pain meds throughout the shift  Outcome: Progressing     Problem: Pain - Adult  Goal: Verbalizes/displays adequate comfort level or baseline comfort level  Outcome: Progressing     Problem: Safety - Adult  Goal: Free from fall injury  Outcome: Progressing     Problem: Discharge Planning  Goal: Discharge to home or other facility with appropriate resources  Outcome: Progressing     Problem: Chronic Conditions and Co-morbidities  Goal: Patient's chronic conditions and co-morbidity symptoms are monitored and maintained or improved  Outcome: Progressing

## 2024-04-26 NOTE — CARE PLAN
Problem: Pain  Goal: My pain/discomfort is manageable  Outcome: Progressing     Problem: Discharge Barriers  Goal: My discharge needs are met  Outcome: Progressing     Problem: Psychosocial Needs  Goal: Collaborate with me, my family, and caregiver to identify my specific goals  Outcome: Progressing     Problem: Pain  Goal: Takes deep breaths with improved pain control throughout the shift  Outcome: Progressing   The patient's goals for the shift include      The clinical goals for the shift include pt will remain hemodynamically stable

## 2024-04-26 NOTE — PROGRESS NOTES
Rambo Daigle is a 64 y.o. male on day 3 of admission presenting with Infected hernioplasty mesh, initial encounter (CMS-[facility]).      Subjective   Rambo Daigle is a 64 y.o. male with Afib, HFpEF, HTN, venous insufficiency, COPD, NA, GERD, lumbar radiculopathy, obesity, umbilical hernia s/p repair presented to ED for abdominal pain. 1 week of swelling and pain around umbilicus. redness and bloody drainage. no f/c. no other recent sx. exercise tolerance fair, able to walk >1 mi and 1-2 flights of stairs. no orthopnea or exertional cp. VSS. umbilical erythema on exam. labs/imaging all wnl. ekg w/ afib, anterior q-waves. ct a/p w/ abscess and potential enterocutaneous fistula surrounding surgical mesh. s/p vanc, zosyn, protonix. admitted to surgery.    RCRI class IV, which carries an 11% risk of major CV complications, though patient able to achieve > 4 METS of activity routinely. Patient's other comorbid conditions are stable. Therefore there are no medical contraindications currently to surgery     4/26/24: Acute events overnight.  Vital stable.  Labs unremarkable.  No leukocytosis, does have slight anemia.  He is s/p Exploration Laparotomy small bowel resection with removal of mesh on 4/25/24. Remains on Zosyn. NPO at time of evaluation, not yet having flatus or Bms post-op, +burping.            Review of Systems   Constitutional:  Negative for appetite change, chills, diaphoresis, fatigue and fever.   HENT:  Negative for congestion, ear pain, facial swelling, hearing loss, nosebleeds, sore throat, tinnitus and trouble swallowing.    Eyes:  Negative for pain.   Respiratory:  Negative for cough, chest tightness, shortness of breath and wheezing.    Cardiovascular:  Negative for chest pain, palpitations and leg swelling.   Gastrointestinal:  Positive for abdominal pain. Negative for blood in stool, constipation, diarrhea, nausea and vomiting.   Genitourinary:  Negative for dysuria, flank pain, frequency, hematuria and  urgency.   Musculoskeletal:  Negative for back pain and joint swelling.   Skin:  Positive for rash and wound.   Neurological:  Negative for dizziness, syncope, weakness, light-headedness, numbness and headaches.   Hematological:  Does not bruise/bleed easily.   Psychiatric/Behavioral:  Negative for behavioral problems, hallucinations and suicidal ideas.           Objective     Last Recorded Vitals  BP (!) 160/93 (BP Location: Left arm, Patient Position: Lying) Comment: rn notified  Pulse 78   Temp 36.8 °C (98.2 °F) (Temporal)   Resp 16   Wt (!) 153 kg (336 lb 12.8 oz)   SpO2 94%     Image Results  CT abdomen pelvis w IV contrast    Result Date: 4/23/2024  Interpreted By:  Rambo Higginbotham, STUDY: CT ABDOMEN PELVIS W IV CONTRAST;  4/23/2024 2:01 pm   INDICATION: Signs/Symptoms:raf-umbilical redness and drainage.   COMPARISON: None.   ACCESSION NUMBER(S): [id number]   ORDERING CLINICIAN: SHELLY LOERA   TECHNIQUE: CT of the abdomen and pelvis from the lung bases through the symphysis pubis after the uneventful administration of intravenous contrast (100 mL Omnipaque 350). No oral contrast.   FINDINGS: LOWER CHEST: No acute airspace disease.   BONES: No acute skeletal findings.   LIVER: Slightly lobular in contour but not overtly cirrhotic. Not overtly fatty. Not enlarged. All vessels patent. No mass   SPLEEN: Normal. No enlargement, mass or evidence of splenic vein thrombosis.   PANCREAS: Normal. No CT evidence of acute or chronic pancreatitis. No duct dilation. No mass.   GALLBLADDER: Normal CT appearance. No dilation, calcified, or gas-containing stones.  Other types of gallstones could be occult on CT and detectable only by ultrasound.   BILE DUCTS: Normal. No biliary duct dilation.   ADRENAL GLANDS: Normal. No nodule or mass.   KIDNEYS AND URETERS: Normal except for the 16 mm simple cyst extending medially from the left kidney between the poles. No hydronephrosis on either side.  No mass.  Symmetric  enhancement.  No infarct or CT evidence of acute pyelonephritis.  No substantial radiodense stone.  Tiny stones and radiolucent stones could be occult on CT. Note distal ureters obscured on both sides due to streak artifact from hip prostheses on both sides   LYMPH NODES: No adenopathy, intraperitoneal, retroperitoneal, pelvic or otherwise   APPENDIX: Normal.  Not dilated, thick walled or in any other way inflamed in appearance.  No inflammatory change about the appendix.   COLON: Normal. No sign of acute diverticulitis or other colitis. No annular constricting mass.   SMALL BOWEL: Refer to the abdominal wall section, below   STOMACH / DUODENUM: Grossly normal by CT which has limited sensitivity and specificity for the stomach and duodenum.   RETROPERITONEUM: Normal.  No acute hemorrhage or inflammatory change. Lymph nodes in a separate dedicated section.   OMENTUM, MESENTERY AND PERITONEAL SPACES: Free intraperitoneal air: Negative Free intraperitoneal fluid: Negative Abscess: Negative Other: n/a   URINARY BLADDER: Mostly obscured by streak artifact   PELVIS: Most of prostate and seminal vesicles obscured by streak artifact   VASCULATURE: Scattered atherosclerotic calcifications on normal caliber abdominal aorta   ABDOMINAL WALL:   Mesh repair material is directly deep to the umbilical tract. No active/untreated hernia   Gas bubbles I have annotated on axial images 102 and 105 may be part of unformed extraluminal reactive fluid or less likely in part of a bowel loop that has slipped ventral/superficial to the left lateral margin of the hernia mesh   Another gas bubble on axial 107 (not annotated) is far removed from any possible small bowel involvement. It is part of an approximately 3 cm transverse, 1.7 cm anteroposterior, 1.8 cm craniocaudal fluid collection that extends from the ventral margin of the hernia mass up to the skin   Additional phlegmonous tissue suspected along the umbilical tract, leading up to the  skin surface where there is extensive skin thickening along the umbilical tract with infiltration of the normal fat density of the subcutaneous fat deep to the skin all suggestive of cellulitis       Cellulitis around the umbilicus   Along the umbilical tract deeper from the skin, probable 3 x 1.8 x 1.7 cm fluid abscess   The gas bubble I have annotated on axial image 105 and another on axial 108 are superficial enough they could theoretically have been pushed into the umbilical tract from outside   On the other hand, a cluster of gas bubbles I have annotated on axial 102 are deep enough to be immediately superficial to the mesh hernia repair material. These bubbles are more alarming that there may be a developing sinus tract from the umbilicus into the peritoneal space, even potentially a burgeoning enterocutaneous fistula. Note one or two segments of small bowel passed directly deep to and adjacent to the left lateral margin of the hernia mesh where the deeper gas bubbles extend. Perhaps the gas bubbles on axial 102 are from a partially collapsed short segment of small bowel that has passed ventral to the mesh   Note in the subcutaneous fat to the right of at, and just above the umbilical cellulitis, there are two subcutaneous foreign bodies, one approximately 18 mm long on sagittal 97, the other approximately 11 mm long on sagittal 95/96. The curvilinear shape suggests they could be suture needles, although the attenuation is fairly low. Note also the round, much higher density foreign body in the subcutaneous fat to the left of and well below the umbilicus on axial image 120; this one resembles a ballistic fragment or BB   No other acute findings   MACRO: None   Signed by: Rambo Higginbotham 4/23/2024 2:42 PM Dictation workstation:   [id number]       Lab Results  Results for orders placed or performed during the hospital encounter of 04/23/24 (from the past 24 hour(s))   CBC   Result Value Ref Range    WBC 8.2 4.4 -  11.3 x10*3/uL    nRBC 0.0 0.0 - 0.0 /100 WBCs    RBC 4.00 (L) 4.50 - 5.90 x10*6/uL    Hemoglobin 12.4 (L) 13.5 - 17.5 g/dL    Hematocrit 38.5 (L) 41.0 - 52.0 %    MCV 96 80 - 100 fL    MCH 31.0 26.0 - 34.0 pg    MCHC 32.2 32.0 - 36.0 g/dL    RDW 13.2 11.5 - 14.5 %    Platelets 265 150 - 450 x10*3/uL   Basic metabolic panel   Result Value Ref Range    Glucose 101 (H) 74 - 99 mg/dL    Sodium 140 136 - 145 mmol/L    Potassium 3.4 (L) 3.5 - 5.3 mmol/L    Chloride 108 (H) 98 - 107 mmol/L    Bicarbonate 25 21 - 32 mmol/L    Anion Gap 10 mmol/L    Urea Nitrogen 8 6 - 23 mg/dL    Creatinine 0.91 0.50 - 1.30 mg/dL    eGFR >90 >60 mL/min/1.73m*2    Calcium 7.6 (L) 8.6 - 10.3 mg/dL   Magnesium   Result Value Ref Range    Magnesium 1.67 1.60 - 2.40 mg/dL   Phosphorus   Result Value Ref Range    Phosphorus 2.9 2.5 - 4.9 mg/dL        Medications  Scheduled medications:  dilTIAZem CD, 120 mg, oral, Daily  [Held by provider] furosemide, 20 mg, oral, Daily  pantoprazole, 40 mg, oral, Daily before breakfast   Or  pantoprazole, 40 mg, intravenous, Daily before breakfast  piperacillin-tazobactam, 3.375 g, intravenous, q6h  pneumococcal conjugate, 0.5 mL, intramuscular, During hospitalization  potassium chloride, 20 mEq, intravenous, q2h  rivaroxaban, 20 mg, oral, Daily      Continuous medications:  dextrose 5 % and lactated Ringer's, 125 mL/hr, Last Rate: 125 mL/hr (04/25/24 1803)      PRN medications:  PRN medications: acetaminophen **OR** acetaminophen **OR** acetaminophen, benzocaine-menthol, hydrALAZINE, HYDROmorphone, ipratropium-albuteroL, ondansetron **OR** ondansetron     Physical Exam  Constitutional:       General: He is not in acute distress.     Appearance: Normal appearance. He is obese.   HENT:      Head: Normocephalic and atraumatic.      Right Ear: External ear normal.      Left Ear: External ear normal.      Nose: Nose normal.      Mouth/Throat:      Mouth: Mucous membranes are moist.      Pharynx: Oropharynx is clear.    Eyes:      Extraocular Movements: Extraocular movements intact.      Conjunctiva/sclera: Conjunctivae normal.      Pupils: Pupils are equal, round, and reactive to light.   Cardiovascular:      Rate and Rhythm: Normal rate. Rhythm irregular.      Pulses: Normal pulses.      Heart sounds: Normal heart sounds.   Pulmonary:      Effort: Pulmonary effort is normal. No respiratory distress.      Breath sounds: Normal breath sounds. No wheezing, rhonchi or rales.   Abdominal:      General: Bowel sounds are normal. There is distension.      Palpations: Abdomen is soft.      Tenderness: There is no abdominal tenderness. There is no right CVA tenderness, left CVA tenderness, guarding or rebound.      Comments: Midline incision with moderate amount of serosanguineous drainage on dressing and abdominal binder.    Musculoskeletal:         General: No swelling. Normal range of motion.      Cervical back: Normal range of motion and neck supple.   Skin:     General: Skin is warm and dry.      Capillary Refill: Capillary refill takes less than 2 seconds.      Findings: Erythema present. No lesion or rash.   Neurological:      General: No focal deficit present.      Mental Status: He is alert and oriented to person, place, and time. Mental status is at baseline.   Psychiatric:         Mood and Affect: Mood normal.         Behavior: Behavior normal.                  Code Status  Full Code     Assessment/Plan      Hx umbilical hernia s/p repair with concern for surgical mesh infection  By 4/23 ct a/p. w/ abscess, suspect arising from bowel  No sepsis   Agree with current abx per surgery- zosyn  Moderate-risk surgery, RCRI class IV, which carries an 11% risk of major CV complications, though patient able to achieve > 4 METS of activity routinely. Patient's other comorbid conditions are stable. Therefore there are no medical contraindications currently to surgery   s/p Exploration Laparotomy small bowel resection with removal of mesh  on 4/25/24  Diet per surgery  Monitor for bowel function      Afib  Stable; permanent, non-valvular, cv 3  Home dilt; xarelto held (no need to bridge)- resume when deemed safe to do so by general surgery     HFpEF  Compensated; diuretics being held per surgery    HTN  Stable  Home dilt; diuretics being held per surgery    Venous insufficiency  Stable    COPD  Stable off tx, no acute exacerbation   Albuterol prn    NA  Home cpap HS    GERD  Stable off tx    Lumbar radiculopathy  Stable; tylenol prn    Obesity  BMI 39. outpt pcp mgt    DVT ppx: Lovenox, resume Xarelto when safe to do so from priamry perspective       Please see orders for more complete plan    Priti Rico PA-C

## 2024-04-26 NOTE — PROGRESS NOTES
"GENERAL SURGERY PROGRESS NOTE    Rambo Daigle   1960   [id number]     Rambo Daigle is a 64 y.o. male on day 3 of admission presenting with Infected hernioplasty mesh, initial encounter (CMS-[facility]).    Exploration Laparotomy small bowel resection with removal of mesh on 4/25/24, 1 Day Post-Op    Subjective  No acute events overnight. Patient reports feeling sore but otherwise doing well. Denies nausea but is having some burping. Has not passed flatus. Is sitting up in a chair on rounds.     Review of Systems:  Review of Systems   Constitutional:  Negative for chills and fever.   Respiratory:  Negative for chest tightness and shortness of breath.    Cardiovascular:  Negative for chest pain and leg swelling.   Gastrointestinal:  Negative for abdominal pain and nausea.   Genitourinary:  Negative for difficulty urinating.   Musculoskeletal:  Negative for joint swelling.   Neurological:  Negative for headaches.       Objective    Last Recorded Vitals  Blood pressure 151/89, pulse 64, temperature 36.2 °C (97.1 °F), temperature source Temporal, resp. rate 16, height 1.956 m (6' 5\"), weight (!) 153 kg (336 lb 12.8 oz), SpO2 96%.    Intake/Output last 3 Shifts:  I/O last 3 completed shifts:  In: 4337.5 (28.4 mL/kg) [I.V.:3987.5 (26.1 mL/kg); IV Piggyback:350]  Out: 2850 (18.7 mL/kg) [Urine:2850 (0.5 mL/kg/hr)]  Weight: 152.8 kg     Intake/Output Summary (Last 24 hours) at 4/26/2024 1010  Last data filed at 4/26/2024 0737  Gross per 24 hour   Intake 2537.5 ml   Output 1100 ml   Net 1437.5 ml       Physical Exam  Vitals reviewed.   Constitutional:       General: He is not in acute distress.  HENT:      Head: Normocephalic and atraumatic.   Eyes:      Extraocular Movements: Extraocular movements intact.      Conjunctiva/sclera: Conjunctivae normal.   Cardiovascular:      Rate and Rhythm: Normal rate and regular rhythm.      Pulses: Normal pulses.   Pulmonary:      Effort: Pulmonary effort is normal. No respiratory distress. "   Abdominal:      Comments: Soft, mildly distended, appropriately tender near incision. Incision dressing with some moderate saturation.    Musculoskeletal:         General: Normal range of motion.      Cervical back: Neck supple.   Skin:     General: Skin is warm and dry.   Neurological:      General: No focal deficit present.      Mental Status: He is alert and oriented to person, place, and time.         Relevant Results  Labs:  Results for orders placed or performed during the hospital encounter of 04/23/24 (from the past 24 hour(s))   CBC   Result Value Ref Range    WBC 8.2 4.4 - 11.3 x10*3/uL    nRBC 0.0 0.0 - 0.0 /100 WBCs    RBC 4.00 (L) 4.50 - 5.90 x10*6/uL    Hemoglobin 12.4 (L) 13.5 - 17.5 g/dL    Hematocrit 38.5 (L) 41.0 - 52.0 %    MCV 96 80 - 100 fL    MCH 31.0 26.0 - 34.0 pg    MCHC 32.2 32.0 - 36.0 g/dL    RDW 13.2 11.5 - 14.5 %    Platelets 265 150 - 450 x10*3/uL   Basic metabolic panel   Result Value Ref Range    Glucose 101 (H) 74 - 99 mg/dL    Sodium 140 136 - 145 mmol/L    Potassium 3.4 (L) 3.5 - 5.3 mmol/L    Chloride 108 (H) 98 - 107 mmol/L    Bicarbonate 25 21 - 32 mmol/L    Anion Gap 10 mmol/L    Urea Nitrogen 8 6 - 23 mg/dL    Creatinine 0.91 0.50 - 1.30 mg/dL    eGFR >90 >60 mL/min/1.73m*2    Calcium 7.6 (L) 8.6 - 10.3 mg/dL   Magnesium   Result Value Ref Range    Magnesium 1.67 1.60 - 2.40 mg/dL   Phosphorus   Result Value Ref Range    Phosphorus 2.9 2.5 - 4.9 mg/dL       Images:  CT abdomen pelvis w IV contrast   Final Result   Cellulitis around the umbilicus        Along the umbilical tract deeper from the skin, probable 3 x 1.8 x   1.7 cm fluid abscess        The gas bubble I have annotated on axial image 105 and another on   axial 108 are superficial enough they could theoretically have been   pushed into the umbilical tract from outside        On the other hand, a cluster of gas bubbles I have annotated on axial   102 are deep enough to be immediately superficial to the mesh hernia    repair material. These bubbles are more alarming that there may be a   developing sinus tract from the umbilicus into the peritoneal space,   even potentially a burgeoning enterocutaneous fistula. Note one or   two segments of small bowel passed directly deep to and adjacent to   the left lateral margin of the hernia mesh where the deeper gas   bubbles extend. Perhaps the gas bubbles on axial 102 are from a   partially collapsed short segment of small bowel that has passed   ventral to the mesh        Note in the subcutaneous fat to the right of at, and just above the   umbilical cellulitis, there are two subcutaneous foreign bodies, one   approximately 18 mm long on sagittal 97, the other approximately 11   mm long on sagittal 95/96. The curvilinear shape suggests they could   be suture needles, although the attenuation is fairly low. Note also   the round, much higher density foreign body in the subcutaneous fat   to the left of and well below the umbilicus on axial image 120; this   one resembles a ballistic fragment or BB        No other acute findings        MACRO:   None        Signed by: Rambo Higginbotham 4/23/2024 2:42 PM   Dictation workstation:   [id number]          Assessment and Plan  Principal Problem:    Infected hernioplasty mesh, initial encounter (CMS-[facility])  Active Problems:    NA (obstructive sleep apnea)    64 y.o. male with history of Afib on Xarelto presenting with umbilical erythema and drainage due to infected prior hernia repair mesh now status post exploratory laparotomy with explantation of mesh and small bowel resection on 4/25/24. Doing expectantly.    Plan:  -Continue pain control and anti-emetics  -Hgb stable this AM, will resume Xarelto  -NPO with IVF, await return of bowel function, ok for sips and ice chips  -Midline dressing down and abelino removed on POD2  -Encourage ambulation and IS use  -SCDs, Xarelto    Discussed with attending Dr. Melissa Anderson, DO - PGY3  General  Surgery

## 2024-04-27 ENCOUNTER — APPOINTMENT (OUTPATIENT)
Dept: RADIOLOGY | Facility: HOSPITAL | Age: 64
DRG: 908 | End: 2024-04-27
Payer: MEDICARE

## 2024-04-27 LAB
ANION GAP SERPL CALC-SCNC: 17 MMOL/L (ref 10–20)
BUN SERPL-MCNC: 9 MG/DL (ref 6–23)
CALCIUM SERPL-MCNC: 9.3 MG/DL (ref 8.6–10.3)
CHLORIDE SERPL-SCNC: 102 MMOL/L (ref 98–107)
CO2 SERPL-SCNC: 21 MMOL/L (ref 21–32)
CREAT SERPL-MCNC: 1.06 MG/DL (ref 0.5–1.3)
EGFRCR SERPLBLD CKD-EPI 2021: 77 ML/MIN/1.73M*2
ERYTHROCYTE [DISTWIDTH] IN BLOOD BY AUTOMATED COUNT: 13.2 % (ref 11.5–14.5)
GLUCOSE SERPL-MCNC: 108 MG/DL (ref 74–99)
HCT VFR BLD AUTO: 43.8 % (ref 41–52)
HGB BLD-MCNC: 14.7 G/DL (ref 13.5–17.5)
MAGNESIUM SERPL-MCNC: 2.17 MG/DL (ref 1.6–2.4)
MCH RBC QN AUTO: 31.7 PG (ref 26–34)
MCHC RBC AUTO-ENTMCNC: 33.6 G/DL (ref 32–36)
MCV RBC AUTO: 94 FL (ref 80–100)
NRBC BLD-RTO: 0 /100 WBCS (ref 0–0)
PHOSPHATE SERPL-MCNC: 2.4 MG/DL (ref 2.5–4.9)
PLATELET # BLD AUTO: 315 X10*3/UL (ref 150–450)
POTASSIUM SERPL-SCNC: 5.1 MMOL/L (ref 3.5–5.3)
RBC # BLD AUTO: 4.64 X10*6/UL (ref 4.5–5.9)
SODIUM SERPL-SCNC: 135 MMOL/L (ref 136–145)
WBC # BLD AUTO: 10.1 X10*3/UL (ref 4.4–11.3)

## 2024-04-27 PROCEDURE — 2500000001 HC RX 250 WO HCPCS SELF ADMINISTERED DRUGS (ALT 637 FOR MEDICARE OP): Performed by: PHYSICIAN ASSISTANT

## 2024-04-27 PROCEDURE — 0D9670Z DRAINAGE OF STOMACH WITH DRAINAGE DEVICE, VIA NATURAL OR ARTIFICIAL OPENING: ICD-10-PCS | Performed by: SURGERY

## 2024-04-27 PROCEDURE — 84100 ASSAY OF PHOSPHORUS: CPT | Performed by: SURGERY

## 2024-04-27 PROCEDURE — 2500000004 HC RX 250 GENERAL PHARMACY W/ HCPCS (ALT 636 FOR OP/ED): Performed by: SURGERY

## 2024-04-27 PROCEDURE — 2500000004 HC RX 250 GENERAL PHARMACY W/ HCPCS (ALT 636 FOR OP/ED): Performed by: STUDENT IN AN ORGANIZED HEALTH CARE EDUCATION/TRAINING PROGRAM

## 2024-04-27 PROCEDURE — 36415 COLL VENOUS BLD VENIPUNCTURE: CPT | Performed by: SURGERY

## 2024-04-27 PROCEDURE — 1100000001 HC PRIVATE ROOM DAILY

## 2024-04-27 PROCEDURE — 2500000005 HC RX 250 GENERAL PHARMACY W/O HCPCS: Performed by: SURGERY

## 2024-04-27 PROCEDURE — 74018 RADEX ABDOMEN 1 VIEW: CPT | Performed by: RADIOLOGY

## 2024-04-27 PROCEDURE — 83735 ASSAY OF MAGNESIUM: CPT | Performed by: SURGERY

## 2024-04-27 PROCEDURE — 2500000001 HC RX 250 WO HCPCS SELF ADMINISTERED DRUGS (ALT 637 FOR MEDICARE OP): Performed by: SURGERY

## 2024-04-27 PROCEDURE — 74018 RADEX ABDOMEN 1 VIEW: CPT

## 2024-04-27 PROCEDURE — 85027 COMPLETE CBC AUTOMATED: CPT | Performed by: SURGERY

## 2024-04-27 PROCEDURE — 2500000002 HC RX 250 W HCPCS SELF ADMINISTERED DRUGS (ALT 637 FOR MEDICARE OP, ALT 636 FOR OP/ED): Performed by: SURGERY

## 2024-04-27 PROCEDURE — 2500000006 HC RX 250 W HCPCS SELF ADMINISTERED DRUGS (ALT 637 FOR ALL PAYERS): Mod: MUE | Performed by: SURGERY

## 2024-04-27 PROCEDURE — 80048 BASIC METABOLIC PNL TOTAL CA: CPT | Performed by: SURGERY

## 2024-04-27 PROCEDURE — 99233 SBSQ HOSP IP/OBS HIGH 50: CPT | Performed by: INTERNAL MEDICINE

## 2024-04-27 RX ORDER — ENOXAPARIN SODIUM 300 MG/3ML
1 INJECTION INTRAVENOUS; SUBCUTANEOUS 2 TIMES DAILY
Status: DISCONTINUED | OUTPATIENT
Start: 2024-04-28 | End: 2024-05-04 | Stop reason: HOSPADM

## 2024-04-27 RX ORDER — POTASSIUM CHLORIDE 14.9 MG/ML
20 INJECTION INTRAVENOUS
Status: DISCONTINUED | OUTPATIENT
Start: 2024-04-27 | End: 2024-04-27

## 2024-04-27 RX ADMIN — Medication 1 SPRAY: at 14:37

## 2024-04-27 RX ADMIN — HYDROMORPHONE HYDROCHLORIDE 0.2 MG: 0.2 INJECTION, SOLUTION INTRAMUSCULAR; INTRAVENOUS; SUBCUTANEOUS at 09:12

## 2024-04-27 RX ADMIN — PIPERACILLIN SODIUM AND TAZOBACTAM SODIUM 3.38 G: 3; .375 INJECTION, SOLUTION INTRAVENOUS at 01:16

## 2024-04-27 RX ADMIN — SODIUM CHLORIDE, SODIUM LACTATE, POTASSIUM CHLORIDE, CALCIUM CHLORIDE AND DEXTROSE MONOHYDRATE 125 ML/HR: 5; 600; 310; 30; 20 INJECTION, SOLUTION INTRAVENOUS at 21:52

## 2024-04-27 RX ADMIN — ONDANSETRON HYDROCHLORIDE 4 MG: 4 TABLET, FILM COATED ORAL at 03:30

## 2024-04-27 RX ADMIN — SODIUM CHLORIDE, SODIUM LACTATE, POTASSIUM CHLORIDE, CALCIUM CHLORIDE AND DEXTROSE MONOHYDRATE 125 ML/HR: 5; 600; 310; 30; 20 INJECTION, SOLUTION INTRAVENOUS at 11:12

## 2024-04-27 RX ADMIN — BENZOCAINE AND MENTHOL, UNSPECIFIED FORM 1 LOZENGE: 15; 3.6 LOZENGE ORAL at 14:34

## 2024-04-27 RX ADMIN — HYDRALAZINE HYDROCHLORIDE 25 MG: 25 TABLET ORAL at 04:43

## 2024-04-27 RX ADMIN — SODIUM CHLORIDE, POTASSIUM CHLORIDE, SODIUM LACTATE AND CALCIUM CHLORIDE 1000 ML: 600; 310; 30; 20 INJECTION, SOLUTION INTRAVENOUS at 09:04

## 2024-04-27 RX ADMIN — PIPERACILLIN SODIUM AND TAZOBACTAM SODIUM 3.38 G: 3; .375 INJECTION, SOLUTION INTRAVENOUS at 06:35

## 2024-04-27 RX ADMIN — PIPERACILLIN SODIUM AND TAZOBACTAM SODIUM 3.38 G: 3; .375 INJECTION, SOLUTION INTRAVENOUS at 11:13

## 2024-04-27 RX ADMIN — RIVAROXABAN 20 MG: 20 TABLET, FILM COATED ORAL at 08:54

## 2024-04-27 RX ADMIN — POTASSIUM CHLORIDE 20 MEQ: 14.9 INJECTION, SOLUTION INTRAVENOUS at 08:55

## 2024-04-27 RX ADMIN — PIPERACILLIN SODIUM AND TAZOBACTAM SODIUM 3.38 G: 3; .375 INJECTION, SOLUTION INTRAVENOUS at 17:40

## 2024-04-27 RX ADMIN — DILTIAZEM HYDROCHLORIDE 120 MG: 120 CAPSULE, COATED, EXTENDED RELEASE ORAL at 08:55

## 2024-04-27 RX ADMIN — IPRATROPIUM BROMIDE AND ALBUTEROL SULFATE 3 ML: .5; 3 SOLUTION RESPIRATORY (INHALATION) at 05:01

## 2024-04-27 RX ADMIN — PANTOPRAZOLE SODIUM 40 MG: 40 TABLET, DELAYED RELEASE ORAL at 06:07

## 2024-04-27 RX ADMIN — HYDROMORPHONE HYDROCHLORIDE 0.2 MG: 0.2 INJECTION, SOLUTION INTRAMUSCULAR; INTRAVENOUS; SUBCUTANEOUS at 01:16

## 2024-04-27 ASSESSMENT — COGNITIVE AND FUNCTIONAL STATUS - GENERAL
DAILY ACTIVITIY SCORE: 24
DAILY ACTIVITIY SCORE: 24
MOBILITY SCORE: 24
MOBILITY SCORE: 24

## 2024-04-27 ASSESSMENT — ENCOUNTER SYMPTOMS
HEADACHES: 0
FEVER: 0
DIFFICULTY URINATING: 0
ABDOMINAL PAIN: 0
SHORTNESS OF BREATH: 0
CHEST TIGHTNESS: 0
NAUSEA: 1
JOINT SWELLING: 0
CHILLS: 0

## 2024-04-27 ASSESSMENT — PAIN DESCRIPTION - ORIENTATION: ORIENTATION: MID

## 2024-04-27 ASSESSMENT — PAIN SCALES - GENERAL
PAINLEVEL_OUTOF10: 8
PAINLEVEL_OUTOF10: 4
PAINLEVEL_OUTOF10: 0 - NO PAIN
PAINLEVEL_OUTOF10: 7

## 2024-04-27 ASSESSMENT — PAIN DESCRIPTION - LOCATION: LOCATION: ABDOMEN

## 2024-04-27 ASSESSMENT — PAIN - FUNCTIONAL ASSESSMENT: PAIN_FUNCTIONAL_ASSESSMENT: 0-10

## 2024-04-27 NOTE — CARE PLAN
The patient's goals for the shift include      The clinical goals for the shift include safety maintained

## 2024-04-27 NOTE — PROGRESS NOTES
Rambo Daigle is a 64 y.o. male on day 4 of admission presenting with Infected hernioplasty mesh, initial encounter (CMS-[facility]).      Subjective   Rambo Daigle is a 64 y.o. male with Afib, HFpEF, HTN, venous insufficiency, COPD, NA, GERD, lumbar radiculopathy, obesity, umbilical hernia s/p repair presented to ED for abdominal pain. 1 week of swelling and pain around umbilicus. redness and bloody drainage. no f/c. no other recent sx. exercise tolerance fair, able to walk >1 mi and 1-2 flights of stairs. no orthopnea or exertional cp. VSS. umbilical erythema on exam. labs/imaging all wnl. ekg w/ afib, anterior q-waves. ct a/p w/ abscess and potential enterocutaneous fistula surrounding surgical mesh. s/p vanc, zosyn, protonix. admitted to surgery.     RCRI class IV, which carries an 11% risk of major CV complications, though patient able to achieve > 4 METS of activity routinely. Patient's other comorbid conditions are stable. Therefore there are no medical contraindications currently to surgery      4/26/24: Acute events overnight.  Vital stable.  Labs unremarkable.  No leukocytosis, does have slight anemia.  He is s/p Exploration Laparotomy small bowel resection with removal of mesh on 4/25/24. Remains on Zosyn. NPO at time of evaluation, not yet having flatus or Bms post-op, +burping.     4/27/2024: Patient has no passing gas. Surgery team concerned about ileus and placed NGT with suction. Patient has no acute complains of abdominal pain or vomiting. Start lovenox 1 mg/kg for Patient is unable to take oral Xarelto.       Objective     Last Recorded Vitals  /83 (BP Location: Right arm, Patient Position: Sitting)   Pulse 83   Temp 36.7 °C (98 °F) (Temporal)   Resp 20   Wt (!) 153 kg (336 lb 12.8 oz)   SpO2 94%   Intake/Output last 3 Shifts:    Intake/Output Summary (Last 24 hours) at 4/27/2024 1306  Last data filed at 4/27/2024 1116  Gross per 24 hour   Intake 1768.75 ml   Output 2025 ml   Net -256.25 ml        Admission Weight  Weight: (!) 154 kg (340 lb) (04/23/24 1058)    Daily Weight  04/23/24 : (!) 153 kg (336 lb 12.8 oz)    Image Results  XR abdomen 1 view  Narrative: Interpreted By:  Cheryle Jackson,   STUDY:  XR ABDOMEN 1 VIEW;  4/27/2024 9:43 am      INDICATION:  Signs/Symptoms:NG tube placement.      COMPARISON:  None.      ACCESSION NUMBER(S):  [id number]      ORDERING CLINICIAN:  COLIN NAJERA      FINDINGS:  Supine views of the abdomen show a nasogastric tube overlying the  left upper abdomen with the side hole possibly in the distal  esophagus. Recommend advancing the nasogastric tube and repeating the  x-ray centered on the abdomen.      There is mild gaseous distention of small-bowel loops. Skin staples  overlie the lower abdomen. There are bilateral hip joint replacements  noted. Degenerative changes are seen in the spine.      Impression: 1.  Mild gaseous distention of small bowel loops  2. Nasogastric tube overlies the left upper abdomen with the side  hole possibly in the esophagus. Recommend advancing the nasogastric  tube approximately 8 cm and repeating the x-ray centered on the upper  abdomen/lung bases to better evaluate tube placement.      MACRO:  None      Signed by: Cheryle Jackson 4/27/2024 10:39 AM  Dictation workstation:   [id number]      Physical Exam  Constitutional:       General: He is not in acute distress.     Appearance: Normal appearance.   HENT:      Head: Normocephalic.      Mouth/Throat:      Mouth: Mucous membranes are moist.      Pharynx: Oropharynx is clear.   Eyes:      Pupils: Pupils are equal, round, and reactive to light.   Cardiovascular:      Rate and Rhythm: Normal rate. Rhythm irregular.      Heart sounds: Normal heart sounds. No murmur heard.     No gallop.   Pulmonary:      Effort: Pulmonary effort is normal.      Breath sounds: Normal breath sounds.   Abdominal:      General: There is distension.      Palpations: Abdomen is soft.      Tenderness: There is no  abdominal tenderness.      Comments: Bowel sounds decreased   Musculoskeletal:         General: No swelling. Normal range of motion.      Cervical back: Neck supple. No rigidity.   Skin:     General: Skin is warm and dry.   Neurological:      General: No focal deficit present.      Mental Status: He is alert.      Cranial Nerves: No cranial nerve deficit.      Sensory: No sensory deficit.   Psychiatric:         Mood and Affect: Mood normal.         Behavior: Behavior normal.         Relevant Results             Scheduled medications  dilTIAZem CD, 120 mg, oral, Daily  enoxaparin, 1 mg/kg, subcutaneous, BID  [Held by provider] furosemide, 20 mg, oral, Daily  pantoprazole, 40 mg, oral, Daily before breakfast   Or  pantoprazole, 40 mg, intravenous, Daily before breakfast  piperacillin-tazobactam, 3.375 g, intravenous, q6h  pneumococcal conjugate, 0.5 mL, intramuscular, During hospitalization  [Held by provider] rivaroxaban, 20 mg, oral, Daily      Continuous medications  dextrose 5 % and lactated Ringer's, 125 mL/hr, Last Rate: 125 mL/hr (04/27/24 1116)      PRN medications  PRN medications: acetaminophen **OR** acetaminophen **OR** acetaminophen, benzocaine-menthol, hydrALAZINE, HYDROmorphone, ipratropium-albuteroL, ondansetron **OR** ondansetron, phenoL  Results for orders placed or performed during the hospital encounter of 04/23/24 (from the past 96 hour(s))   CBC   Result Value Ref Range    WBC 7.6 4.4 - 11.3 x10*3/uL    nRBC 0.0 0.0 - 0.0 /100 WBCs    RBC 4.22 (L) 4.50 - 5.90 x10*6/uL    Hemoglobin 13.4 (L) 13.5 - 17.5 g/dL    Hematocrit 39.5 (L) 41.0 - 52.0 %    MCV 94 80 - 100 fL    MCH 31.8 26.0 - 34.0 pg    MCHC 33.9 32.0 - 36.0 g/dL    RDW 13.1 11.5 - 14.5 %    Platelets 274 150 - 450 x10*3/uL   Magnesium   Result Value Ref Range    Magnesium 1.93 1.60 - 2.40 mg/dL   Vancomycin   Result Value Ref Range    Vancomycin 20.0 5.0 - 20.0 ug/mL   CBC   Result Value Ref Range    WBC 5.4 4.4 - 11.3 x10*3/uL    nRBC  0.0 0.0 - 0.0 /100 WBCs    RBC 3.98 (L) 4.50 - 5.90 x10*6/uL    Hemoglobin 12.6 (L) 13.5 - 17.5 g/dL    Hematocrit 37.4 (L) 41.0 - 52.0 %    MCV 94 80 - 100 fL    MCH 31.7 26.0 - 34.0 pg    MCHC 33.7 32.0 - 36.0 g/dL    RDW 13.1 11.5 - 14.5 %    Platelets 241 150 - 450 x10*3/uL   Basic metabolic panel   Result Value Ref Range    Glucose 104 (H) 74 - 99 mg/dL    Sodium 139 136 - 145 mmol/L    Potassium 3.8 3.5 - 5.3 mmol/L    Chloride 105 98 - 107 mmol/L    Bicarbonate 27 21 - 32 mmol/L    Anion Gap 11 10 - 20 mmol/L    Urea Nitrogen 11 6 - 23 mg/dL    Creatinine 1.05 0.50 - 1.30 mg/dL    eGFR 78 >60 mL/min/1.73m*2    Calcium 8.7 8.6 - 10.3 mg/dL   Magnesium   Result Value Ref Range    Magnesium 1.94 1.60 - 2.40 mg/dL   Phosphorus   Result Value Ref Range    Phosphorus 3.5 2.5 - 4.9 mg/dL   CBC   Result Value Ref Range    WBC 8.2 4.4 - 11.3 x10*3/uL    nRBC 0.0 0.0 - 0.0 /100 WBCs    RBC 4.00 (L) 4.50 - 5.90 x10*6/uL    Hemoglobin 12.4 (L) 13.5 - 17.5 g/dL    Hematocrit 38.5 (L) 41.0 - 52.0 %    MCV 96 80 - 100 fL    MCH 31.0 26.0 - 34.0 pg    MCHC 32.2 32.0 - 36.0 g/dL    RDW 13.2 11.5 - 14.5 %    Platelets 265 150 - 450 x10*3/uL   Basic metabolic panel   Result Value Ref Range    Glucose 101 (H) 74 - 99 mg/dL    Sodium 140 136 - 145 mmol/L    Potassium 3.4 (L) 3.5 - 5.3 mmol/L    Chloride 108 (H) 98 - 107 mmol/L    Bicarbonate 25 21 - 32 mmol/L    Anion Gap 10 mmol/L    Urea Nitrogen 8 6 - 23 mg/dL    Creatinine 0.91 0.50 - 1.30 mg/dL    eGFR >90 >60 mL/min/1.73m*2    Calcium 7.6 (L) 8.6 - 10.3 mg/dL   Magnesium   Result Value Ref Range    Magnesium 1.67 1.60 - 2.40 mg/dL   Phosphorus   Result Value Ref Range    Phosphorus 2.9 2.5 - 4.9 mg/dL   CBC   Result Value Ref Range    WBC 10.1 4.4 - 11.3 x10*3/uL    nRBC 0.0 0.0 - 0.0 /100 WBCs    RBC 4.64 4.50 - 5.90 x10*6/uL    Hemoglobin 14.7 13.5 - 17.5 g/dL    Hematocrit 43.8 41.0 - 52.0 %    MCV 94 80 - 100 fL    MCH 31.7 26.0 - 34.0 pg    MCHC 33.6 32.0 - 36.0 g/dL     RDW 13.2 11.5 - 14.5 %    Platelets 315 150 - 450 x10*3/uL   Basic metabolic panel   Result Value Ref Range    Glucose 108 (H) 74 - 99 mg/dL    Sodium 135 (L) 136 - 145 mmol/L    Potassium 5.1 3.5 - 5.3 mmol/L    Chloride 102 98 - 107 mmol/L    Bicarbonate 21 21 - 32 mmol/L    Anion Gap 17 10 - 20 mmol/L    Urea Nitrogen 9 6 - 23 mg/dL    Creatinine 1.06 0.50 - 1.30 mg/dL    eGFR 77 >60 mL/min/1.73m*2    Calcium 9.3 8.6 - 10.3 mg/dL   Magnesium   Result Value Ref Range    Magnesium 2.17 1.60 - 2.40 mg/dL   Phosphorus   Result Value Ref Range    Phosphorus 2.4 (L) 2.5 - 4.9 mg/dL     XR abdomen 1 view    Result Date: 4/27/2024  Interpreted By:  Cheryle Jackson, STUDY: XR ABDOMEN 1 VIEW;  4/27/2024 9:43 am   INDICATION: Signs/Symptoms:NG tube placement.   COMPARISON: None.   ACCESSION NUMBER(S): [id number]   ORDERING CLINICIAN: COLIN NAJERA   FINDINGS: Supine views of the abdomen show a nasogastric tube overlying the left upper abdomen with the side hole possibly in the distal esophagus. Recommend advancing the nasogastric tube and repeating the x-ray centered on the abdomen.   There is mild gaseous distention of small-bowel loops. Skin staples overlie the lower abdomen. There are bilateral hip joint replacements noted. Degenerative changes are seen in the spine.       1.  Mild gaseous distention of small bowel loops 2. Nasogastric tube overlies the left upper abdomen with the side hole possibly in the esophagus. Recommend advancing the nasogastric tube approximately 8 cm and repeating the x-ray centered on the upper abdomen/lung bases to better evaluate tube placement.   MACRO: None   Signed by: Cheryle Jackson 4/27/2024 10:39 AM Dictation workstation:   [id number]    CT abdomen pelvis w IV contrast    Result Date: 4/23/2024  Interpreted By:  Rambo Higginbotham, STUDY: CT ABDOMEN PELVIS W IV CONTRAST;  4/23/2024 2:01 pm   INDICATION: Signs/Symptoms:raf-umbilical redness and drainage.   COMPARISON: None.   ACCESSION  NUMBER(S): [id number]   ORDERING CLINICIAN: SHELLY LOERA   TECHNIQUE: CT of the abdomen and pelvis from the lung bases through the symphysis pubis after the uneventful administration of intravenous contrast (100 mL Omnipaque 350). No oral contrast.   FINDINGS: LOWER CHEST: No acute airspace disease.   BONES: No acute skeletal findings.   LIVER: Slightly lobular in contour but not overtly cirrhotic. Not overtly fatty. Not enlarged. All vessels patent. No mass   SPLEEN: Normal. No enlargement, mass or evidence of splenic vein thrombosis.   PANCREAS: Normal. No CT evidence of acute or chronic pancreatitis. No duct dilation. No mass.   GALLBLADDER: Normal CT appearance. No dilation, calcified, or gas-containing stones.  Other types of gallstones could be occult on CT and detectable only by ultrasound.   BILE DUCTS: Normal. No biliary duct dilation.   ADRENAL GLANDS: Normal. No nodule or mass.   KIDNEYS AND URETERS: Normal except for the 16 mm simple cyst extending medially from the left kidney between the poles. No hydronephrosis on either side.  No mass.  Symmetric enhancement.  No infarct or CT evidence of acute pyelonephritis.  No substantial radiodense stone.  Tiny stones and radiolucent stones could be occult on CT. Note distal ureters obscured on both sides due to streak artifact from hip prostheses on both sides   LYMPH NODES: No adenopathy, intraperitoneal, retroperitoneal, pelvic or otherwise   APPENDIX: Normal.  Not dilated, thick walled or in any other way inflamed in appearance.  No inflammatory change about the appendix.   COLON: Normal. No sign of acute diverticulitis or other colitis. No annular constricting mass.   SMALL BOWEL: Refer to the abdominal wall section, below   STOMACH / DUODENUM: Grossly normal by CT which has limited sensitivity and specificity for the stomach and duodenum.   RETROPERITONEUM: Normal.  No acute hemorrhage or inflammatory change. Lymph nodes in a separate dedicated  section.   OMENTUM, MESENTERY AND PERITONEAL SPACES: Free intraperitoneal air: Negative Free intraperitoneal fluid: Negative Abscess: Negative Other: n/a   URINARY BLADDER: Mostly obscured by streak artifact   PELVIS: Most of prostate and seminal vesicles obscured by streak artifact   VASCULATURE: Scattered atherosclerotic calcifications on normal caliber abdominal aorta   ABDOMINAL WALL:   Mesh repair material is directly deep to the umbilical tract. No active/untreated hernia   Gas bubbles I have annotated on axial images 102 and 105 may be part of unformed extraluminal reactive fluid or less likely in part of a bowel loop that has slipped ventral/superficial to the left lateral margin of the hernia mesh   Another gas bubble on axial 107 (not annotated) is far removed from any possible small bowel involvement. It is part of an approximately 3 cm transverse, 1.7 cm anteroposterior, 1.8 cm craniocaudal fluid collection that extends from the ventral margin of the hernia mass up to the skin   Additional phlegmonous tissue suspected along the umbilical tract, leading up to the skin surface where there is extensive skin thickening along the umbilical tract with infiltration of the normal fat density of the subcutaneous fat deep to the skin all suggestive of cellulitis       Cellulitis around the umbilicus   Along the umbilical tract deeper from the skin, probable 3 x 1.8 x 1.7 cm fluid abscess   The gas bubble I have annotated on axial image 105 and another on axial 108 are superficial enough they could theoretically have been pushed into the umbilical tract from outside   On the other hand, a cluster of gas bubbles I have annotated on axial 102 are deep enough to be immediately superficial to the mesh hernia repair material. These bubbles are more alarming that there may be a developing sinus tract from the umbilicus into the peritoneal space, even potentially a burgeoning enterocutaneous fistula. Note one or two  segments of small bowel passed directly deep to and adjacent to the left lateral margin of the hernia mesh where the deeper gas bubbles extend. Perhaps the gas bubbles on axial 102 are from a partially collapsed short segment of small bowel that has passed ventral to the mesh   Note in the subcutaneous fat to the right of at, and just above the umbilical cellulitis, there are two subcutaneous foreign bodies, one approximately 18 mm long on sagittal 97, the other approximately 11 mm long on sagittal 95/96. The curvilinear shape suggests they could be suture needles, although the attenuation is fairly low. Note also the round, much higher density foreign body in the subcutaneous fat to the left of and well below the umbilicus on axial image 120; this one resembles a ballistic fragment or BB   No other acute findings   MACRO: None   Signed by: Rambo Higginbotham 4/23/2024 2:42 PM Dictation workstation:   [id number]     Assessment/Plan                  Principal Problem:    Infected hernioplasty mesh, initial encounter (CMS-HCC)  Active Problems:    NA (obstructive sleep apnea)    1. Infected hernioplasty mesh, initial encounter (CMS-HCC)  Case Request Operating Room: Exploration Laparotomy    Case Request Operating Room: Exploration Laparotomy    Surgical Pathology Exam    Surgical Pathology Exam    s/p surgery, manage per surgery team      2. Ileus (Multi)      NPO, NGT with suction, supportive care      3. Permanent atrial fibrillation (Multi)      HR controlled, switch to lovenox for unable to take oral Xarelto      4. Benign essential hypertension      monitor      5. Chronic heart failure with preserved ejection fraction (Multi)        6. NA (obstructive sleep apnea)      CPAP      7. Morbid obesity with BMI of 40.0-44.9, adult (Multi)                        Jon Thomson MD

## 2024-04-27 NOTE — CARE PLAN
Problem: Pain  Goal: My pain/discomfort is manageable  Outcome: Progressing     Problem: Daily Care  Goal: Daily care needs are met  Outcome: Progressing     Problem: Discharge Barriers  Goal: My discharge needs are met  Outcome: Progressing     Problem: Safety  Goal: Patient will be injury free during hospitalization  Outcome: Progressing  Goal: I will remain free of falls  Outcome: Progressing     Problem: Psychosocial Needs  Goal: Demonstrates ability to cope with hospitalization/illness  Outcome: Progressing  Goal: Collaborate with me, my family, and caregiver to identify my specific goals  Outcome: Progressing     Problem: Pain  Goal: Takes deep breaths with improved pain control throughout the shift  Outcome: Progressing  Goal: Turns in bed with improved pain control throughout the shift  Outcome: Progressing  Goal: Walks with improved pain control throughout the shift  Outcome: Progressing  Goal: Performs ADL's with improved pain control throughout shift  Outcome: Progressing  Goal: Participates in PT with improved pain control throughout the shift  Outcome: Progressing  Goal: Free from opioid side effects throughout the shift  Outcome: Progressing  Goal: Free from acute confusion related to pain meds throughout the shift  Outcome: Progressing     Problem: Pain - Adult  Goal: Verbalizes/displays adequate comfort level or baseline comfort level  Outcome: Progressing     Problem: Safety - Adult  Goal: Free from fall injury  Outcome: Progressing     Problem: Discharge Planning  Goal: Discharge to home or other facility with appropriate resources  Outcome: Progressing     Problem: Chronic Conditions and Co-morbidities  Goal: Patient's chronic conditions and co-morbidity symptoms are monitored and maintained or improved  Outcome: Progressing   The patient's goals for the shift include      The clinical goals for the shift include ptws BP will maintain below 170, systolically, this shift.

## 2024-04-27 NOTE — PROGRESS NOTES
"GENERAL SURGERY PROGRESS NOTE    Rambo Daigle   1960   [id number]     Rambo Daigle is a 64 y.o. male on day 4 of admission presenting with Infected hernioplasty mesh, initial encounter (CMS-[facility]).    Exploration Laparotomy small bowel resection with removal of mesh on 4/25/24, 2 Days Post-Op    Subjective  No acute events overnight. Patient reports feeling unwell this AM. Felt short of breath and distended. Having some nausea and burping. Is not passing flatus.     Review of Systems:  Review of Systems   Constitutional:  Negative for chills and fever.   Respiratory:  Negative for chest tightness and shortness of breath.    Cardiovascular:  Negative for chest pain and leg swelling.   Gastrointestinal:  Positive for nausea. Negative for abdominal pain.   Genitourinary:  Negative for difficulty urinating.   Musculoskeletal:  Negative for joint swelling.   Neurological:  Negative for headaches.       Objective    Last Recorded Vitals  Blood pressure 149/83, pulse 83, temperature 36.7 °C (98 °F), temperature source Temporal, resp. rate 20, height 1.956 m (6' 5\"), weight (!) 153 kg (336 lb 12.8 oz), SpO2 94%.    Intake/Output last 3 Shifts:  I/O last 3 completed shifts:  In: 300 (2 mL/kg) [I.V.:50 (0.3 mL/kg); IV Piggyback:250]  Out: 1050 (6.9 mL/kg) [Urine:1050 (0.2 mL/kg/hr)]  Weight: 152.8 kg     Intake/Output Summary (Last 24 hours) at 4/27/2024 1343  Last data filed at 4/27/2024 1116  Gross per 24 hour   Intake 1768.75 ml   Output 2025 ml   Net -256.25 ml       Physical Exam  Vitals reviewed.   Constitutional:       General: He is not in acute distress.  HENT:      Head: Normocephalic and atraumatic.   Eyes:      Extraocular Movements: Extraocular movements intact.      Conjunctiva/sclera: Conjunctivae normal.   Cardiovascular:      Rate and Rhythm: Normal rate and regular rhythm.      Pulses: Normal pulses.   Pulmonary:      Effort: Pulmonary effort is normal. No respiratory distress.   Abdominal:      Comments: " Soft, moderately distended. Incision dressing removed, abelino removed, incision with minimal erythema, improving, no drainage. Appropriately tender.    Musculoskeletal:         General: Normal range of motion.      Cervical back: Neck supple.   Skin:     General: Skin is warm and dry.   Neurological:      General: No focal deficit present.      Mental Status: He is alert and oriented to person, place, and time.         Relevant Results  Labs:  Results for orders placed or performed during the hospital encounter of 04/23/24 (from the past 24 hour(s))   CBC   Result Value Ref Range    WBC 10.1 4.4 - 11.3 x10*3/uL    nRBC 0.0 0.0 - 0.0 /100 WBCs    RBC 4.64 4.50 - 5.90 x10*6/uL    Hemoglobin 14.7 13.5 - 17.5 g/dL    Hematocrit 43.8 41.0 - 52.0 %    MCV 94 80 - 100 fL    MCH 31.7 26.0 - 34.0 pg    MCHC 33.6 32.0 - 36.0 g/dL    RDW 13.2 11.5 - 14.5 %    Platelets 315 150 - 450 x10*3/uL   Basic metabolic panel   Result Value Ref Range    Glucose 108 (H) 74 - 99 mg/dL    Sodium 135 (L) 136 - 145 mmol/L    Potassium 5.1 3.5 - 5.3 mmol/L    Chloride 102 98 - 107 mmol/L    Bicarbonate 21 21 - 32 mmol/L    Anion Gap 17 10 - 20 mmol/L    Urea Nitrogen 9 6 - 23 mg/dL    Creatinine 1.06 0.50 - 1.30 mg/dL    eGFR 77 >60 mL/min/1.73m*2    Calcium 9.3 8.6 - 10.3 mg/dL   Magnesium   Result Value Ref Range    Magnesium 2.17 1.60 - 2.40 mg/dL   Phosphorus   Result Value Ref Range    Phosphorus 2.4 (L) 2.5 - 4.9 mg/dL       Images:  XR abdomen 1 view   Final Result   1.  Mild gaseous distention of small bowel loops   2. Nasogastric tube overlies the left upper abdomen with the side   hole possibly in the esophagus. Recommend advancing the nasogastric   tube approximately 8 cm and repeating the x-ray centered on the upper   abdomen/lung bases to better evaluate tube placement.        MACRO:   None        Signed by: Cheryle Jackson 4/27/2024 10:39 AM   Dictation workstation:   [id number]      CT abdomen pelvis w IV contrast   Final Result    Cellulitis around the umbilicus        Along the umbilical tract deeper from the skin, probable 3 x 1.8 x   1.7 cm fluid abscess        The gas bubble I have annotated on axial image 105 and another on   axial 108 are superficial enough they could theoretically have been   pushed into the umbilical tract from outside        On the other hand, a cluster of gas bubbles I have annotated on axial   102 are deep enough to be immediately superficial to the mesh hernia   repair material. These bubbles are more alarming that there may be a   developing sinus tract from the umbilicus into the peritoneal space,   even potentially a burgeoning enterocutaneous fistula. Note one or   two segments of small bowel passed directly deep to and adjacent to   the left lateral margin of the hernia mesh where the deeper gas   bubbles extend. Perhaps the gas bubbles on axial 102 are from a   partially collapsed short segment of small bowel that has passed   ventral to the mesh        Note in the subcutaneous fat to the right of at, and just above the   umbilical cellulitis, there are two subcutaneous foreign bodies, one   approximately 18 mm long on sagittal 97, the other approximately 11   mm long on sagittal 95/96. The curvilinear shape suggests they could   be suture needles, although the attenuation is fairly low. Note also   the round, much higher density foreign body in the subcutaneous fat   to the left of and well below the umbilicus on axial image 120; this   one resembles a ballistic fragment or BB        No other acute findings        MACRO:   None        Signed by: Rambo Higginbotham 4/23/2024 2:42 PM   Dictation workstation:   [id number]          Assessment and Plan  Principal Problem:    Infected hernioplasty mesh, initial encounter (CMS-HCC)  Active Problems:    NA (obstructive sleep apnea)    64 y.o. male with history of Afib on Xarelto presenting with umbilical erythema and drainage due to infected prior hernia repair mesh  now status post exploratory laparotomy with explantation of mesh and small bowel resection on 4/25/24 with ileus.    Plan:  -Continue pain control and anti-emetics  -NG tube placed at bedside this AM with 1.5L of bilious output at placment. Continue to LIS, monitor output  -NPO with IVF, await return of bowel function  -Xarelto held d/t NPO, bridges to LVX per IMS  -Continue IV abx: zosyn  -Encourage ambulation and IS use  -SCDs, LVX    Discussed with attending Dr. Melissa Anderson,  - PGY3  General Surgery

## 2024-04-28 LAB
ANION GAP SERPL CALC-SCNC: 14 MMOL/L (ref 10–20)
BUN SERPL-MCNC: 12 MG/DL (ref 6–23)
CALCIUM SERPL-MCNC: 8.8 MG/DL (ref 8.6–10.3)
CHLORIDE SERPL-SCNC: 103 MMOL/L (ref 98–107)
CO2 SERPL-SCNC: 26 MMOL/L (ref 21–32)
CREAT SERPL-MCNC: 1.01 MG/DL (ref 0.5–1.3)
EGFRCR SERPLBLD CKD-EPI 2021: 82 ML/MIN/1.73M*2
ERYTHROCYTE [DISTWIDTH] IN BLOOD BY AUTOMATED COUNT: 13.3 % (ref 11.5–14.5)
GLUCOSE SERPL-MCNC: 110 MG/DL (ref 74–99)
HCT VFR BLD AUTO: 41.4 % (ref 41–52)
HGB BLD-MCNC: 13.7 G/DL (ref 13.5–17.5)
MAGNESIUM SERPL-MCNC: 2.13 MG/DL (ref 1.6–2.4)
MCH RBC QN AUTO: 31.6 PG (ref 26–34)
MCHC RBC AUTO-ENTMCNC: 33.1 G/DL (ref 32–36)
MCV RBC AUTO: 96 FL (ref 80–100)
NRBC BLD-RTO: 0 /100 WBCS (ref 0–0)
PHOSPHATE SERPL-MCNC: 2.6 MG/DL (ref 2.5–4.9)
PLATELET # BLD AUTO: 326 X10*3/UL (ref 150–450)
POTASSIUM SERPL-SCNC: 3.8 MMOL/L (ref 3.5–5.3)
RBC # BLD AUTO: 4.33 X10*6/UL (ref 4.5–5.9)
SODIUM SERPL-SCNC: 139 MMOL/L (ref 136–145)
WBC # BLD AUTO: 8.6 X10*3/UL (ref 4.4–11.3)

## 2024-04-28 PROCEDURE — 2500000004 HC RX 250 GENERAL PHARMACY W/ HCPCS (ALT 636 FOR OP/ED): Performed by: STUDENT IN AN ORGANIZED HEALTH CARE EDUCATION/TRAINING PROGRAM

## 2024-04-28 PROCEDURE — 99024 POSTOP FOLLOW-UP VISIT: CPT | Performed by: SURGERY

## 2024-04-28 PROCEDURE — 83735 ASSAY OF MAGNESIUM: CPT | Performed by: SURGERY

## 2024-04-28 PROCEDURE — 1100000001 HC PRIVATE ROOM DAILY

## 2024-04-28 PROCEDURE — 82374 ASSAY BLOOD CARBON DIOXIDE: CPT | Performed by: INTERNAL MEDICINE

## 2024-04-28 PROCEDURE — 84100 ASSAY OF PHOSPHORUS: CPT | Performed by: SURGERY

## 2024-04-28 PROCEDURE — 99233 SBSQ HOSP IP/OBS HIGH 50: CPT | Performed by: INTERNAL MEDICINE

## 2024-04-28 PROCEDURE — 2500000004 HC RX 250 GENERAL PHARMACY W/ HCPCS (ALT 636 FOR OP/ED): Mod: MUE | Performed by: INTERNAL MEDICINE

## 2024-04-28 PROCEDURE — 85027 COMPLETE CBC AUTOMATED: CPT | Performed by: INTERNAL MEDICINE

## 2024-04-28 PROCEDURE — 2500000004 HC RX 250 GENERAL PHARMACY W/ HCPCS (ALT 636 FOR OP/ED): Performed by: SURGERY

## 2024-04-28 PROCEDURE — 36415 COLL VENOUS BLD VENIPUNCTURE: CPT | Performed by: INTERNAL MEDICINE

## 2024-04-28 PROCEDURE — 2500000001 HC RX 250 WO HCPCS SELF ADMINISTERED DRUGS (ALT 637 FOR MEDICARE OP): Performed by: SURGERY

## 2024-04-28 PROCEDURE — C9113 INJ PANTOPRAZOLE SODIUM, VIA: HCPCS | Performed by: SURGERY

## 2024-04-28 RX ADMIN — ENOXAPARIN SODIUM 150 MG: 300 INJECTION INTRAVENOUS; SUBCUTANEOUS at 20:53

## 2024-04-28 RX ADMIN — DILTIAZEM HYDROCHLORIDE 120 MG: 120 CAPSULE, COATED, EXTENDED RELEASE ORAL at 10:08

## 2024-04-28 RX ADMIN — SODIUM CHLORIDE, SODIUM LACTATE, POTASSIUM CHLORIDE, CALCIUM CHLORIDE AND DEXTROSE MONOHYDRATE 125 ML/HR: 5; 600; 310; 30; 20 INJECTION, SOLUTION INTRAVENOUS at 18:20

## 2024-04-28 RX ADMIN — ENOXAPARIN SODIUM 150 MG: 300 INJECTION INTRAVENOUS; SUBCUTANEOUS at 10:08

## 2024-04-28 RX ADMIN — SODIUM CHLORIDE, SODIUM LACTATE, POTASSIUM CHLORIDE, CALCIUM CHLORIDE AND DEXTROSE MONOHYDRATE 125 ML/HR: 5; 600; 310; 30; 20 INJECTION, SOLUTION INTRAVENOUS at 06:42

## 2024-04-28 RX ADMIN — PIPERACILLIN SODIUM AND TAZOBACTAM SODIUM 3.38 G: 3; .375 INJECTION, SOLUTION INTRAVENOUS at 06:48

## 2024-04-28 RX ADMIN — PIPERACILLIN SODIUM AND TAZOBACTAM SODIUM 3.38 G: 3; .375 INJECTION, SOLUTION INTRAVENOUS at 00:53

## 2024-04-28 RX ADMIN — PIPERACILLIN SODIUM AND TAZOBACTAM SODIUM 3.38 G: 3; .375 INJECTION, SOLUTION INTRAVENOUS at 12:39

## 2024-04-28 RX ADMIN — PANTOPRAZOLE SODIUM 40 MG: 40 INJECTION, POWDER, FOR SOLUTION INTRAVENOUS at 06:56

## 2024-04-28 RX ADMIN — HYDROMORPHONE HYDROCHLORIDE 0.2 MG: 0.2 INJECTION, SOLUTION INTRAMUSCULAR; INTRAVENOUS; SUBCUTANEOUS at 13:29

## 2024-04-28 RX ADMIN — PIPERACILLIN SODIUM AND TAZOBACTAM SODIUM 3.38 G: 3; .375 INJECTION, SOLUTION INTRAVENOUS at 18:20

## 2024-04-28 ASSESSMENT — ENCOUNTER SYMPTOMS
FEVER: 0
CHEST TIGHTNESS: 0
CHILLS: 0
SHORTNESS OF BREATH: 0
DIFFICULTY URINATING: 0
NAUSEA: 0
HEADACHES: 0
JOINT SWELLING: 0
ABDOMINAL PAIN: 0

## 2024-04-28 ASSESSMENT — PAIN SCALES - GENERAL
PAINLEVEL_OUTOF10: 2
PAINLEVEL_OUTOF10: 1
PAINLEVEL_OUTOF10: 7

## 2024-04-28 ASSESSMENT — COGNITIVE AND FUNCTIONAL STATUS - GENERAL
MOBILITY SCORE: 24
DAILY ACTIVITIY SCORE: 24
MOBILITY SCORE: 24
DAILY ACTIVITIY SCORE: 24

## 2024-04-28 ASSESSMENT — PAIN - FUNCTIONAL ASSESSMENT
PAIN_FUNCTIONAL_ASSESSMENT: 0-10

## 2024-04-28 ASSESSMENT — PAIN DESCRIPTION - LOCATION: LOCATION: ABDOMEN

## 2024-04-28 NOTE — PROGRESS NOTES
Rambo Daigle is a 64 y.o. male on day 5 of admission presenting with Infected hernioplasty mesh, initial encounter (CMS-[facility]).      Subjective   Rambo Daigle is a 64 y.o. male with Afib, HFpEF, HTN, venous insufficiency, COPD, NA, GERD, lumbar radiculopathy, obesity, umbilical hernia s/p repair presented to ED for abdominal pain. 1 week of swelling and pain around umbilicus. redness and bloody drainage. no f/c. no other recent sx. exercise tolerance fair, able to walk >1 mi and 1-2 flights of stairs. no orthopnea or exertional cp. VSS. umbilical erythema on exam. labs/imaging all wnl. ekg w/ afib, anterior q-waves. ct a/p w/ abscess and potential enterocutaneous fistula surrounding surgical mesh. s/p vanc, zosyn, protonix. admitted to surgery.     RCRI class IV, which carries an 11% risk of major CV complications, though patient able to achieve > 4 METS of activity routinely. Patient's other comorbid conditions are stable. Therefore there are no medical contraindications currently to surgery      4/26/24: Acute events overnight.  Vital stable.  Labs unremarkable.  No leukocytosis, does have slight anemia.  He is s/p Exploration Laparotomy small bowel resection with removal of mesh on 4/25/24. Remains on Zosyn. NPO at time of evaluation, not yet having flatus or Bms post-op, +burping.      4/27/2024: Patient has no passing gas. Surgery team concerned about ileus and placed NGT with suction. Patient has no acute complains of abdominal pain or vomiting. Start lovenox 1 mg/kg for Patient is unable to take oral Xarelto.     4/28/2024: Exploration Laparotomy small bowel resection with removal of mesh on 4/25/24 POD #3. Patient remained on NGT with low suction with 2 L dark green fluid. Patient has no nausea or vomiting but abdomen remained distended with decreased bowel sound.       Objective     Last Recorded Vitals  /71 (BP Location: Left arm, Patient Position: Lying)   Pulse 79   Temp 36.7 °C (98.1 °F)  (Temporal)   Resp 22   Wt (!) 153 kg (336 lb 12.8 oz)   SpO2 96%   Intake/Output last 3 Shifts:    Intake/Output Summary (Last 24 hours) at 4/28/2024 1319  Last data filed at 4/28/2024 1239  Gross per 24 hour   Intake 1901.24 ml   Output 3390 ml   Net -1488.76 ml       Admission Weight  Weight: (!) 154 kg (340 lb) (04/23/24 1058)    Daily Weight  04/23/24 : (!) 153 kg (336 lb 12.8 oz)    Image Results  XR abdomen 1 view  Narrative: Interpreted By:  Cheryle Jackson,   STUDY:  XR ABDOMEN 1 VIEW;  4/27/2024 9:43 am      INDICATION:  Signs/Symptoms:NG tube placement.      COMPARISON:  None.      ACCESSION NUMBER(S):  [id number]      ORDERING CLINICIAN:  COLIN NAJERA      FINDINGS:  Supine views of the abdomen show a nasogastric tube overlying the  left upper abdomen with the side hole possibly in the distal  esophagus. Recommend advancing the nasogastric tube and repeating the  x-ray centered on the abdomen.      There is mild gaseous distention of small-bowel loops. Skin staples  overlie the lower abdomen. There are bilateral hip joint replacements  noted. Degenerative changes are seen in the spine.      Impression: 1.  Mild gaseous distention of small bowel loops  2. Nasogastric tube overlies the left upper abdomen with the side  hole possibly in the esophagus. Recommend advancing the nasogastric  tube approximately 8 cm and repeating the x-ray centered on the upper  abdomen/lung bases to better evaluate tube placement.      MACRO:  None      Signed by: Cheryle Jackson 4/27/2024 10:39 AM  Dictation workstation:   [id number]      Physical Exam  Constitutional:       General: He is not in acute distress.     Appearance: Normal appearance.   HENT:      Head: Normocephalic.      Mouth/Throat:      Mouth: Mucous membranes are moist.      Pharynx: Oropharynx is clear.   Eyes:      Pupils: Pupils are equal, round, and reactive to light.   Cardiovascular:      Rate and Rhythm: Normal rate. Rhythm irregular.      Heart  sounds: Normal heart sounds. No murmur heard.     No gallop.   Pulmonary:      Effort: Pulmonary effort is normal.      Breath sounds: Normal breath sounds.   Abdominal:      General: There is distension.      Palpations: Abdomen is soft.      Tenderness: There is no abdominal tenderness. There is no guarding.      Comments: Bowel sounds decreased   Musculoskeletal:         General: No swelling. Normal range of motion.      Cervical back: Neck supple. No rigidity.   Skin:     General: Skin is warm and dry.   Neurological:      General: No focal deficit present.      Mental Status: He is alert.      Cranial Nerves: No cranial nerve deficit.      Sensory: No sensory deficit.   Psychiatric:         Mood and Affect: Mood normal.         Behavior: Behavior normal.         Relevant Results             Scheduled medications  dilTIAZem CD, 120 mg, oral, Daily  enoxaparin, 1 mg/kg, subcutaneous, BID  [Held by provider] furosemide, 20 mg, oral, Daily  pantoprazole, 40 mg, oral, Daily before breakfast   Or  pantoprazole, 40 mg, intravenous, Daily before breakfast  piperacillin-tazobactam, 3.375 g, intravenous, q6h  pneumococcal conjugate, 0.5 mL, intramuscular, During hospitalization  [Held by provider] rivaroxaban, 20 mg, oral, Daily      Continuous medications  dextrose 5 % and lactated Ringer's, 125 mL/hr, Last Rate: 125 mL/hr (04/28/24 0642)      PRN medications  PRN medications: acetaminophen **OR** acetaminophen **OR** acetaminophen, benzocaine-menthol, hydrALAZINE, HYDROmorphone, ipratropium-albuteroL, ondansetron **OR** ondansetron, phenoL  Results for orders placed or performed during the hospital encounter of 04/23/24 (from the past 96 hour(s))   Vancomycin   Result Value Ref Range    Vancomycin 20.0 5.0 - 20.0 ug/mL   CBC   Result Value Ref Range    WBC 5.4 4.4 - 11.3 x10*3/uL    nRBC 0.0 0.0 - 0.0 /100 WBCs    RBC 3.98 (L) 4.50 - 5.90 x10*6/uL    Hemoglobin 12.6 (L) 13.5 - 17.5 g/dL    Hematocrit 37.4 (L) 41.0 -  52.0 %    MCV 94 80 - 100 fL    MCH 31.7 26.0 - 34.0 pg    MCHC 33.7 32.0 - 36.0 g/dL    RDW 13.1 11.5 - 14.5 %    Platelets 241 150 - 450 x10*3/uL   Basic metabolic panel   Result Value Ref Range    Glucose 104 (H) 74 - 99 mg/dL    Sodium 139 136 - 145 mmol/L    Potassium 3.8 3.5 - 5.3 mmol/L    Chloride 105 98 - 107 mmol/L    Bicarbonate 27 21 - 32 mmol/L    Anion Gap 11 10 - 20 mmol/L    Urea Nitrogen 11 6 - 23 mg/dL    Creatinine 1.05 0.50 - 1.30 mg/dL    eGFR 78 >60 mL/min/1.73m*2    Calcium 8.7 8.6 - 10.3 mg/dL   Magnesium   Result Value Ref Range    Magnesium 1.94 1.60 - 2.40 mg/dL   Phosphorus   Result Value Ref Range    Phosphorus 3.5 2.5 - 4.9 mg/dL   CBC   Result Value Ref Range    WBC 8.2 4.4 - 11.3 x10*3/uL    nRBC 0.0 0.0 - 0.0 /100 WBCs    RBC 4.00 (L) 4.50 - 5.90 x10*6/uL    Hemoglobin 12.4 (L) 13.5 - 17.5 g/dL    Hematocrit 38.5 (L) 41.0 - 52.0 %    MCV 96 80 - 100 fL    MCH 31.0 26.0 - 34.0 pg    MCHC 32.2 32.0 - 36.0 g/dL    RDW 13.2 11.5 - 14.5 %    Platelets 265 150 - 450 x10*3/uL   Basic metabolic panel   Result Value Ref Range    Glucose 101 (H) 74 - 99 mg/dL    Sodium 140 136 - 145 mmol/L    Potassium 3.4 (L) 3.5 - 5.3 mmol/L    Chloride 108 (H) 98 - 107 mmol/L    Bicarbonate 25 21 - 32 mmol/L    Anion Gap 10 mmol/L    Urea Nitrogen 8 6 - 23 mg/dL    Creatinine 0.91 0.50 - 1.30 mg/dL    eGFR >90 >60 mL/min/1.73m*2    Calcium 7.6 (L) 8.6 - 10.3 mg/dL   Magnesium   Result Value Ref Range    Magnesium 1.67 1.60 - 2.40 mg/dL   Phosphorus   Result Value Ref Range    Phosphorus 2.9 2.5 - 4.9 mg/dL   CBC   Result Value Ref Range    WBC 10.1 4.4 - 11.3 x10*3/uL    nRBC 0.0 0.0 - 0.0 /100 WBCs    RBC 4.64 4.50 - 5.90 x10*6/uL    Hemoglobin 14.7 13.5 - 17.5 g/dL    Hematocrit 43.8 41.0 - 52.0 %    MCV 94 80 - 100 fL    MCH 31.7 26.0 - 34.0 pg    MCHC 33.6 32.0 - 36.0 g/dL    RDW 13.2 11.5 - 14.5 %    Platelets 315 150 - 450 x10*3/uL   Basic metabolic panel   Result Value Ref Range    Glucose 108 (H) 74  - 99 mg/dL    Sodium 135 (L) 136 - 145 mmol/L    Potassium 5.1 3.5 - 5.3 mmol/L    Chloride 102 98 - 107 mmol/L    Bicarbonate 21 21 - 32 mmol/L    Anion Gap 17 10 - 20 mmol/L    Urea Nitrogen 9 6 - 23 mg/dL    Creatinine 1.06 0.50 - 1.30 mg/dL    eGFR 77 >60 mL/min/1.73m*2    Calcium 9.3 8.6 - 10.3 mg/dL   Magnesium   Result Value Ref Range    Magnesium 2.17 1.60 - 2.40 mg/dL   Phosphorus   Result Value Ref Range    Phosphorus 2.4 (L) 2.5 - 4.9 mg/dL   CBC   Result Value Ref Range    WBC 8.6 4.4 - 11.3 x10*3/uL    nRBC 0.0 0.0 - 0.0 /100 WBCs    RBC 4.33 (L) 4.50 - 5.90 x10*6/uL    Hemoglobin 13.7 13.5 - 17.5 g/dL    Hematocrit 41.4 41.0 - 52.0 %    MCV 96 80 - 100 fL    MCH 31.6 26.0 - 34.0 pg    MCHC 33.1 32.0 - 36.0 g/dL    RDW 13.3 11.5 - 14.5 %    Platelets 326 150 - 450 x10*3/uL   Basic Metabolic Panel   Result Value Ref Range    Glucose 110 (H) 74 - 99 mg/dL    Sodium 139 136 - 145 mmol/L    Potassium 3.8 3.5 - 5.3 mmol/L    Chloride 103 98 - 107 mmol/L    Bicarbonate 26 21 - 32 mmol/L    Anion Gap 14 10 - 20 mmol/L    Urea Nitrogen 12 6 - 23 mg/dL    Creatinine 1.01 0.50 - 1.30 mg/dL    eGFR 82 >60 mL/min/1.73m*2    Calcium 8.8 8.6 - 10.3 mg/dL   Magnesium   Result Value Ref Range    Magnesium 2.13 1.60 - 2.40 mg/dL   Phosphorus   Result Value Ref Range    Phosphorus 2.6 2.5 - 4.9 mg/dL       Assessment/Plan                  Principal Problem:    Infected hernioplasty mesh, initial encounter (CMS-HCC)  Active Problems:    NA (obstructive sleep apnea)    1. Infected hernioplasty mesh, initial encounter (CMS-HCC)  Case Request Operating Room: Exploration Laparotomy    Case Request Operating Room: Exploration Laparotomy    Surgical Pathology Exam    Surgical Pathology Exam    POD #3, s/p surgery, manage per surgery team      2. Ileus (Multi)      NPO, NGT with suction, supportive care      3. Permanent atrial fibrillation (Multi)      HR controlled, switch to lovenox for unable to take oral Xarelto      4.  Benign essential hypertension      monitor      5. Chronic heart failure with preserved ejection fraction (Multi)      stable      6. NA (obstructive sleep apnea)      CPAP      7. Morbid obesity with BMI of 40.0-44.9, adult (Multi)                        Jon Thomson MD

## 2024-04-28 NOTE — PROGRESS NOTES
"GENERAL SURGERY PROGRESS NOTE    Rambo Daigle   1960   [id number]     Rambo Daigle is a 64 y.o. male on day 5 of admission presenting with Infected hernioplasty mesh, initial encounter (CMS-[facility]).    Exploration Laparotomy small bowel resection with removal of mesh on 4/25/24, 3 Days Post-Op    Subjective  No acute events overnight. NG tube placed yesterday with over 2 L out since placement. Patient reports feeling improved. Denies nausea. Has not passed flatus.     Review of Systems:  Review of Systems   Constitutional:  Negative for chills and fever.   Respiratory:  Negative for chest tightness and shortness of breath.    Cardiovascular:  Negative for chest pain and leg swelling.   Gastrointestinal:  Negative for abdominal pain and nausea.   Genitourinary:  Negative for difficulty urinating.   Musculoskeletal:  Negative for joint swelling.   Neurological:  Negative for headaches.       Objective    Last Recorded Vitals  Blood pressure 155/89, pulse 75, temperature 36.6 °C (97.8 °F), temperature source Temporal, resp. rate 22, height 1.956 m (6' 5\"), weight (!) 153 kg (336 lb 12.8 oz), SpO2 93%.    Intake/Output last 3 Shifts:  I/O last 3 completed shifts:  In: 3220 (21.1 mL/kg) [P.O.:120; I.V.:1850 (12.1 mL/kg); IV Piggyback:1250]  Out: 5415 (35.4 mL/kg) [Urine:1565 (0.3 mL/kg/hr); Emesis/NG output:3850]  Weight: 152.8 kg     Intake/Output Summary (Last 24 hours) at 4/28/2024 0819  Last data filed at 4/28/2024 0656  Gross per 24 hour   Intake 3169.99 ml   Output 3815 ml   Net -645.01 ml       Physical Exam  Vitals reviewed.   Constitutional:       General: He is not in acute distress.  HENT:      Head: Normocephalic and atraumatic.   Eyes:      Extraocular Movements: Extraocular movements intact.      Conjunctiva/sclera: Conjunctivae normal.   Cardiovascular:      Rate and Rhythm: Normal rate and regular rhythm.      Pulses: Normal pulses.   Pulmonary:      Effort: Pulmonary effort is normal. No respiratory " distress.   Abdominal:      Comments: Soft, moderately distended, improved from yesterday. Incision with mild erythema, improved, minimal drainage.   Musculoskeletal:         General: Normal range of motion.      Cervical back: Neck supple.   Skin:     General: Skin is warm and dry.   Neurological:      General: No focal deficit present.      Mental Status: He is alert and oriented to person, place, and time.         Relevant Results  Labs:  Results for orders placed or performed during the hospital encounter of 04/23/24 (from the past 24 hour(s))   CBC   Result Value Ref Range    WBC 8.6 4.4 - 11.3 x10*3/uL    nRBC 0.0 0.0 - 0.0 /100 WBCs    RBC 4.33 (L) 4.50 - 5.90 x10*6/uL    Hemoglobin 13.7 13.5 - 17.5 g/dL    Hematocrit 41.4 41.0 - 52.0 %    MCV 96 80 - 100 fL    MCH 31.6 26.0 - 34.0 pg    MCHC 33.1 32.0 - 36.0 g/dL    RDW 13.3 11.5 - 14.5 %    Platelets 326 150 - 450 x10*3/uL       Images:  XR abdomen 1 view   Final Result   1.  Mild gaseous distention of small bowel loops   2. Nasogastric tube overlies the left upper abdomen with the side   hole possibly in the esophagus. Recommend advancing the nasogastric   tube approximately 8 cm and repeating the x-ray centered on the upper   abdomen/lung bases to better evaluate tube placement.        MACRO:   None        Signed by: Cheryle Jackson 4/27/2024 10:39 AM   Dictation workstation:   [id number]      CT abdomen pelvis w IV contrast   Final Result   Cellulitis around the umbilicus        Along the umbilical tract deeper from the skin, probable 3 x 1.8 x   1.7 cm fluid abscess        The gas bubble I have annotated on axial image 105 and another on   axial 108 are superficial enough they could theoretically have been   pushed into the umbilical tract from outside        On the other hand, a cluster of gas bubbles I have annotated on axial   102 are deep enough to be immediately superficial to the mesh hernia   repair material. These bubbles are more alarming that  there may be a   developing sinus tract from the umbilicus into the peritoneal space,   even potentially a burgeoning enterocutaneous fistula. Note one or   two segments of small bowel passed directly deep to and adjacent to   the left lateral margin of the hernia mesh where the deeper gas   bubbles extend. Perhaps the gas bubbles on axial 102 are from a   partially collapsed short segment of small bowel that has passed   ventral to the mesh        Note in the subcutaneous fat to the right of at, and just above the   umbilical cellulitis, there are two subcutaneous foreign bodies, one   approximately 18 mm long on sagittal 97, the other approximately 11   mm long on sagittal 95/96. The curvilinear shape suggests they could   be suture needles, although the attenuation is fairly low. Note also   the round, much higher density foreign body in the subcutaneous fat   to the left of and well below the umbilicus on axial image 120; this   one resembles a ballistic fragment or BB        No other acute findings        MACRO:   None        Signed by: Rambo Higginbotham 4/23/2024 2:42 PM   Dictation workstation:   [id number]          Assessment and Plan  Principal Problem:    Infected hernioplasty mesh, initial encounter (CMS-HCC)  Active Problems:    NA (obstructive sleep apnea)    64 y.o. male with history of Afib on Xarelto presenting with umbilical erythema and drainage due to infected prior hernia repair mesh now status post exploratory laparotomy with explantation of mesh and small bowel resection on 4/25/24 with ileus.    Plan:  -Continue pain control and anti-emetics  -NG tube to LIS, monitor output  -NPO with IVF, await return of bowel function  -Xarelto held d/t NPO, LVX per IMS  -Continue IV abx: zosyn  -Encourage ambulation and IS use  -SCDs, LVX    Discussed with attending Dr. Gonzalez Anderson, DO - PGY3  General Surgery

## 2024-04-28 NOTE — CARE PLAN
The patient's goals for the shift include      Problem: Pain  Goal: My pain/discomfort is manageable  Outcome: Progressing     Problem: Daily Care  Goal: Daily care needs are met  Outcome: Progressing     Problem: Discharge Barriers  Goal: My discharge needs are met  Outcome: Progressing     Problem: Safety  Goal: Patient will be injury free during hospitalization  Outcome: Progressing  Goal: I will remain free of falls  Outcome: Progressing     Problem: Psychosocial Needs  Goal: Demonstrates ability to cope with hospitalization/illness  Outcome: Progressing  Goal: Collaborate with me, my family, and caregiver to identify my specific goals  Outcome: Progressing     Problem: Pain  Goal: Takes deep breaths with improved pain control throughout the shift  Outcome: Progressing  Goal: Turns in bed with improved pain control throughout the shift  Outcome: Progressing  Goal: Walks with improved pain control throughout the shift  Outcome: Progressing  Goal: Performs ADL's with improved pain control throughout shift  Outcome: Progressing  Goal: Participates in PT with improved pain control throughout the shift  Outcome: Progressing  Goal: Free from opioid side effects throughout the shift  Outcome: Progressing  Goal: Free from acute confusion related to pain meds throughout the shift  Outcome: Progressing     Problem: Pain - Adult  Goal: Verbalizes/displays adequate comfort level or baseline comfort level  Outcome: Progressing     Problem: Safety - Adult  Goal: Free from fall injury  Outcome: Progressing     Problem: Discharge Planning  Goal: Discharge to home or other facility with appropriate resources  Outcome: Progressing     Problem: Chronic Conditions and Co-morbidities  Goal: Patient's chronic conditions and co-morbidity symptoms are monitored and maintained or improved  Outcome: Progressing     Problem: Chronic Conditions and Co-morbidities  Goal: Patient's chronic conditions and co-morbidity symptoms are monitored  and maintained or improved  Outcome: Progressing       The clinical goals for the shift include progressing    See assessment and mar. Remains on room air. AM labs ordered. BM noted this shift. IVF as ordered. NG intact.

## 2024-04-29 ENCOUNTER — TELEMEDICINE (OUTPATIENT)
Dept: PHARMACY | Facility: HOSPITAL | Age: 64
End: 2024-04-29
Payer: MEDICARE

## 2024-04-29 DIAGNOSIS — I50.9 HEART FAILURE, UNSPECIFIED HF CHRONICITY, UNSPECIFIED HEART FAILURE TYPE (MULTI): Primary | ICD-10-CM

## 2024-04-29 LAB
ANION GAP SERPL CALC-SCNC: 12 MMOL/L (ref 10–20)
BUN SERPL-MCNC: 14 MG/DL (ref 6–23)
CALCIUM SERPL-MCNC: 8.4 MG/DL (ref 8.6–10.3)
CHLORIDE SERPL-SCNC: 103 MMOL/L (ref 98–107)
CO2 SERPL-SCNC: 28 MMOL/L (ref 21–32)
CREAT SERPL-MCNC: 1.01 MG/DL (ref 0.5–1.3)
EGFRCR SERPLBLD CKD-EPI 2021: 82 ML/MIN/1.73M*2
ERYTHROCYTE [DISTWIDTH] IN BLOOD BY AUTOMATED COUNT: 13.2 % (ref 11.5–14.5)
GLUCOSE SERPL-MCNC: 105 MG/DL (ref 74–99)
HCT VFR BLD AUTO: 39.7 % (ref 41–52)
HGB BLD-MCNC: 12.9 G/DL (ref 13.5–17.5)
MAGNESIUM SERPL-MCNC: 2.06 MG/DL (ref 1.6–2.4)
MCH RBC QN AUTO: 31.3 PG (ref 26–34)
MCHC RBC AUTO-ENTMCNC: 32.5 G/DL (ref 32–36)
MCV RBC AUTO: 96 FL (ref 80–100)
NRBC BLD-RTO: 0 /100 WBCS (ref 0–0)
PHOSPHATE SERPL-MCNC: 3 MG/DL (ref 2.5–4.9)
PLATELET # BLD AUTO: 302 X10*3/UL (ref 150–450)
POTASSIUM SERPL-SCNC: 3.6 MMOL/L (ref 3.5–5.3)
RBC # BLD AUTO: 4.12 X10*6/UL (ref 4.5–5.9)
SODIUM SERPL-SCNC: 139 MMOL/L (ref 136–145)
WBC # BLD AUTO: 7.6 X10*3/UL (ref 4.4–11.3)

## 2024-04-29 PROCEDURE — 2500000004 HC RX 250 GENERAL PHARMACY W/ HCPCS (ALT 636 FOR OP/ED)

## 2024-04-29 PROCEDURE — 85027 COMPLETE CBC AUTOMATED: CPT | Performed by: SURGERY

## 2024-04-29 PROCEDURE — 36415 COLL VENOUS BLD VENIPUNCTURE: CPT | Performed by: SURGERY

## 2024-04-29 PROCEDURE — 80048 BASIC METABOLIC PNL TOTAL CA: CPT | Performed by: SURGERY

## 2024-04-29 PROCEDURE — 84100 ASSAY OF PHOSPHORUS: CPT | Performed by: SURGERY

## 2024-04-29 PROCEDURE — 2500000004 HC RX 250 GENERAL PHARMACY W/ HCPCS (ALT 636 FOR OP/ED): Mod: MUE | Performed by: INTERNAL MEDICINE

## 2024-04-29 PROCEDURE — 1100000001 HC PRIVATE ROOM DAILY

## 2024-04-29 PROCEDURE — 2500000004 HC RX 250 GENERAL PHARMACY W/ HCPCS (ALT 636 FOR OP/ED): Performed by: SURGERY

## 2024-04-29 PROCEDURE — C9113 INJ PANTOPRAZOLE SODIUM, VIA: HCPCS | Performed by: SURGERY

## 2024-04-29 PROCEDURE — 99232 SBSQ HOSP IP/OBS MODERATE 35: CPT | Performed by: NURSE PRACTITIONER

## 2024-04-29 PROCEDURE — 83735 ASSAY OF MAGNESIUM: CPT | Performed by: SURGERY

## 2024-04-29 RX ADMIN — PIPERACILLIN SODIUM AND TAZOBACTAM SODIUM 3.38 G: 3; .375 INJECTION, SOLUTION INTRAVENOUS at 17:57

## 2024-04-29 RX ADMIN — PANTOPRAZOLE SODIUM 40 MG: 40 INJECTION, POWDER, FOR SOLUTION INTRAVENOUS at 05:12

## 2024-04-29 RX ADMIN — SODIUM CHLORIDE, SODIUM LACTATE, POTASSIUM CHLORIDE, CALCIUM CHLORIDE AND DEXTROSE MONOHYDRATE 125 ML/HR: 5; 600; 310; 30; 20 INJECTION, SOLUTION INTRAVENOUS at 11:50

## 2024-04-29 RX ADMIN — ENOXAPARIN SODIUM 150 MG: 300 INJECTION INTRAVENOUS; SUBCUTANEOUS at 21:12

## 2024-04-29 RX ADMIN — SODIUM CHLORIDE, SODIUM LACTATE, POTASSIUM CHLORIDE, CALCIUM CHLORIDE AND DEXTROSE MONOHYDRATE 125 ML/HR: 5; 600; 310; 30; 20 INJECTION, SOLUTION INTRAVENOUS at 03:25

## 2024-04-29 RX ADMIN — PIPERACILLIN SODIUM AND TAZOBACTAM SODIUM 3.38 G: 3; .375 INJECTION, SOLUTION INTRAVENOUS at 11:48

## 2024-04-29 RX ADMIN — ENOXAPARIN SODIUM 150 MG: 300 INJECTION INTRAVENOUS; SUBCUTANEOUS at 09:03

## 2024-04-29 RX ADMIN — SODIUM CHLORIDE, POTASSIUM CHLORIDE, SODIUM LACTATE AND CALCIUM CHLORIDE 500 ML: 600; 310; 30; 20 INJECTION, SOLUTION INTRAVENOUS at 13:47

## 2024-04-29 RX ADMIN — PIPERACILLIN SODIUM AND TAZOBACTAM SODIUM 3.38 G: 3; .375 INJECTION, SOLUTION INTRAVENOUS at 00:00

## 2024-04-29 RX ADMIN — PIPERACILLIN SODIUM AND TAZOBACTAM SODIUM 3.38 G: 3; .375 INJECTION, SOLUTION INTRAVENOUS at 05:12

## 2024-04-29 RX ADMIN — PIPERACILLIN SODIUM AND TAZOBACTAM SODIUM 3.38 G: 3; .375 INJECTION, SOLUTION INTRAVENOUS at 23:55

## 2024-04-29 ASSESSMENT — ENCOUNTER SYMPTOMS
CHEST TIGHTNESS: 0
DIFFICULTY URINATING: 0
CHILLS: 0
ABDOMINAL PAIN: 0
SHORTNESS OF BREATH: 0
FEVER: 0
NAUSEA: 0
HEADACHES: 0
JOINT SWELLING: 0

## 2024-04-29 ASSESSMENT — PAIN SCALES - GENERAL
PAINLEVEL_OUTOF10: 0 - NO PAIN
PAINLEVEL_OUTOF10: 2
PAINLEVEL_OUTOF10: 0 - NO PAIN

## 2024-04-29 ASSESSMENT — COGNITIVE AND FUNCTIONAL STATUS - GENERAL
DAILY ACTIVITIY SCORE: 24
MOBILITY SCORE: 24

## 2024-04-29 ASSESSMENT — PAIN - FUNCTIONAL ASSESSMENT
PAIN_FUNCTIONAL_ASSESSMENT: 0-10
PAIN_FUNCTIONAL_ASSESSMENT: 0-10

## 2024-04-29 NOTE — PROGRESS NOTES
Discussed discharge planning during interdisciplinary rounds with Dr. Easton. Patient continues to have NG tube to suction and is receiving IV fluids. Patient plans to return home with no needs when medically ready.

## 2024-04-29 NOTE — PROGRESS NOTES
Rambo Daigle is a 64 y.o. male on day 6 of admission presenting with Infected hernioplasty mesh, initial encounter (CMS-[facility]).      Subjective   Rambo Daigle is a 64 y.o. male with Afib, HFpEF, HTN, venous insufficiency, COPD, NA, GERD, lumbar radiculopathy, obesity, umbilical hernia s/p repair presented to ED for abdominal pain. 1 week of swelling and pain around umbilicus. redness and bloody drainage. no f/c. no other recent sx. exercise tolerance fair, able to walk >1 mi and 1-2 flights of stairs. no orthopnea or exertional cp. VSS. umbilical erythema on exam. labs/imaging all wnl. ekg w/ afib, anterior q-waves. ct a/p w/ abscess and potential enterocutaneous fistula surrounding surgical mesh. s/p vanc, zosyn, protonix. admitted to surgery.     RCRI class IV, which carries an 11% risk of major CV complications, though patient able to achieve > 4 METS of activity routinely. Patient's other comorbid conditions are stable. Therefore there are no medical contraindications currently to surgery      4/26/24: Acute events overnight.  Vital stable.  Labs unremarkable.  No leukocytosis, does have slight anemia.  He is s/p Exploration Laparotomy small bowel resection with removal of mesh on 4/25/24. Remains on Zosyn. NPO at time of evaluation, not yet having flatus or Bms post-op, +burping.      4/27/2024: Patient has no passing gas. Surgery team concerned about ileus and placed NGT with suction. Patient has no acute complains of abdominal pain or vomiting. Start lovenox 1 mg/kg for Patient is unable to take oral Xarelto.     4/28/2024: Exploration Laparotomy small bowel resection with removal of mesh on 4/25/24 POD #3. Patient remained on NGT with low suction with 2 L dark green fluid. Patient has no nausea or vomiting but abdomen remained distended with decreased bowel sound.    4/29/2024 No significant events overnight, now PD#4 pt had Bm x 2 and continues to have  Flatus, Pain is controlled, occasional cough, no cp or  palpitations. Anticipate clamping of NGT today, pt encouraged to ambulate.        Objective     Last Recorded Vitals  BP (!) 155/92 (BP Location: Left arm)   Pulse 80   Temp 36.8 °C (98.2 °F) (Temporal)   Resp 21   Wt (!) 153 kg (336 lb 12.8 oz)   SpO2 95%   Intake/Output last 3 Shifts:    Intake/Output Summary (Last 24 hours) at 4/29/2024 0829  Last data filed at 4/29/2024 0711  Gross per 24 hour   Intake 2219.59 ml   Output 2350 ml   Net -130.41 ml       Admission Weight  Weight: (!) 154 kg (340 lb) (04/23/24 1058)    Daily Weight  04/23/24 : (!) 153 kg (336 lb 12.8 oz)    Image Results  XR abdomen 1 view  Narrative: Interpreted By:  Cheryle Jackson,   STUDY:  XR ABDOMEN 1 VIEW;  4/27/2024 9:43 am      INDICATION:  Signs/Symptoms:NG tube placement.      COMPARISON:  None.      ACCESSION NUMBER(S):  [id number]      ORDERING CLINICIAN:  COLIN NAJERA      FINDINGS:  Supine views of the abdomen show a nasogastric tube overlying the  left upper abdomen with the side hole possibly in the distal  esophagus. Recommend advancing the nasogastric tube and repeating the  x-ray centered on the abdomen.      There is mild gaseous distention of small-bowel loops. Skin staples  overlie the lower abdomen. There are bilateral hip joint replacements  noted. Degenerative changes are seen in the spine.      Impression: 1.  Mild gaseous distention of small bowel loops  2. Nasogastric tube overlies the left upper abdomen with the side  hole possibly in the esophagus. Recommend advancing the nasogastric  tube approximately 8 cm and repeating the x-ray centered on the upper  abdomen/lung bases to better evaluate tube placement.      MACRO:  None      Signed by: Cheryle Jackson 4/27/2024 10:39 AM  Dictation workstation:   [id number]      Physical Exam  Constitutional:       General: He is not in acute distress.     Appearance: Normal appearance.   HENT:      Head: Normocephalic.      Mouth/Throat:      Mouth: Mucous membranes are  moist.      Pharynx: Oropharynx is clear.   Eyes:      Pupils: Pupils are equal, round, and reactive to light.   Cardiovascular:      Rate and Rhythm: Normal rate and regular rhythm.      Heart sounds: Normal heart sounds. No murmur heard.     No gallop.   Pulmonary:      Effort: Pulmonary effort is normal.      Breath sounds: Normal breath sounds.   Abdominal:      General: There is distension.      Palpations: Abdomen is soft.      Tenderness: There is no abdominal tenderness. There is no guarding.      Comments: Bowel sounds decreased   Musculoskeletal:         General: No swelling. Normal range of motion.      Cervical back: Neck supple. No rigidity.   Skin:     General: Skin is warm and dry.   Neurological:      General: No focal deficit present.      Mental Status: He is alert.      Cranial Nerves: No cranial nerve deficit.      Sensory: No sensory deficit.   Psychiatric:         Mood and Affect: Mood normal.         Behavior: Behavior normal.         Relevant Results             Scheduled medications  dilTIAZem CD, 120 mg, oral, Daily  enoxaparin, 1 mg/kg, subcutaneous, BID  [Held by provider] furosemide, 20 mg, oral, Daily  pantoprazole, 40 mg, oral, Daily before breakfast   Or  pantoprazole, 40 mg, intravenous, Daily before breakfast  piperacillin-tazobactam, 3.375 g, intravenous, q6h  pneumococcal conjugate, 0.5 mL, intramuscular, During hospitalization  [Held by provider] rivaroxaban, 20 mg, oral, Daily      Continuous medications  dextrose 5 % and lactated Ringer's, 125 mL/hr, Last Rate: 125 mL/hr (04/29/24 0325)      PRN medications  PRN medications: acetaminophen **OR** acetaminophen **OR** acetaminophen, benzocaine-menthol, hydrALAZINE, HYDROmorphone, ipratropium-albuteroL, ondansetron **OR** ondansetron, phenoL  Results for orders placed or performed during the hospital encounter of 04/23/24 (from the past 96 hour(s))   CBC   Result Value Ref Range    WBC 8.2 4.4 - 11.3 x10*3/uL    nRBC 0.0 0.0 - 0.0  /100 WBCs    RBC 4.00 (L) 4.50 - 5.90 x10*6/uL    Hemoglobin 12.4 (L) 13.5 - 17.5 g/dL    Hematocrit 38.5 (L) 41.0 - 52.0 %    MCV 96 80 - 100 fL    MCH 31.0 26.0 - 34.0 pg    MCHC 32.2 32.0 - 36.0 g/dL    RDW 13.2 11.5 - 14.5 %    Platelets 265 150 - 450 x10*3/uL   Basic metabolic panel   Result Value Ref Range    Glucose 101 (H) 74 - 99 mg/dL    Sodium 140 136 - 145 mmol/L    Potassium 3.4 (L) 3.5 - 5.3 mmol/L    Chloride 108 (H) 98 - 107 mmol/L    Bicarbonate 25 21 - 32 mmol/L    Anion Gap 10 mmol/L    Urea Nitrogen 8 6 - 23 mg/dL    Creatinine 0.91 0.50 - 1.30 mg/dL    eGFR >90 >60 mL/min/1.73m*2    Calcium 7.6 (L) 8.6 - 10.3 mg/dL   Magnesium   Result Value Ref Range    Magnesium 1.67 1.60 - 2.40 mg/dL   Phosphorus   Result Value Ref Range    Phosphorus 2.9 2.5 - 4.9 mg/dL   CBC   Result Value Ref Range    WBC 10.1 4.4 - 11.3 x10*3/uL    nRBC 0.0 0.0 - 0.0 /100 WBCs    RBC 4.64 4.50 - 5.90 x10*6/uL    Hemoglobin 14.7 13.5 - 17.5 g/dL    Hematocrit 43.8 41.0 - 52.0 %    MCV 94 80 - 100 fL    MCH 31.7 26.0 - 34.0 pg    MCHC 33.6 32.0 - 36.0 g/dL    RDW 13.2 11.5 - 14.5 %    Platelets 315 150 - 450 x10*3/uL   Basic metabolic panel   Result Value Ref Range    Glucose 108 (H) 74 - 99 mg/dL    Sodium 135 (L) 136 - 145 mmol/L    Potassium 5.1 3.5 - 5.3 mmol/L    Chloride 102 98 - 107 mmol/L    Bicarbonate 21 21 - 32 mmol/L    Anion Gap 17 10 - 20 mmol/L    Urea Nitrogen 9 6 - 23 mg/dL    Creatinine 1.06 0.50 - 1.30 mg/dL    eGFR 77 >60 mL/min/1.73m*2    Calcium 9.3 8.6 - 10.3 mg/dL   Magnesium   Result Value Ref Range    Magnesium 2.17 1.60 - 2.40 mg/dL   Phosphorus   Result Value Ref Range    Phosphorus 2.4 (L) 2.5 - 4.9 mg/dL   CBC   Result Value Ref Range    WBC 8.6 4.4 - 11.3 x10*3/uL    nRBC 0.0 0.0 - 0.0 /100 WBCs    RBC 4.33 (L) 4.50 - 5.90 x10*6/uL    Hemoglobin 13.7 13.5 - 17.5 g/dL    Hematocrit 41.4 41.0 - 52.0 %    MCV 96 80 - 100 fL    MCH 31.6 26.0 - 34.0 pg    MCHC 33.1 32.0 - 36.0 g/dL    RDW 13.3 11.5  - 14.5 %    Platelets 326 150 - 450 x10*3/uL   Basic Metabolic Panel   Result Value Ref Range    Glucose 110 (H) 74 - 99 mg/dL    Sodium 139 136 - 145 mmol/L    Potassium 3.8 3.5 - 5.3 mmol/L    Chloride 103 98 - 107 mmol/L    Bicarbonate 26 21 - 32 mmol/L    Anion Gap 14 10 - 20 mmol/L    Urea Nitrogen 12 6 - 23 mg/dL    Creatinine 1.01 0.50 - 1.30 mg/dL    eGFR 82 >60 mL/min/1.73m*2    Calcium 8.8 8.6 - 10.3 mg/dL   Magnesium   Result Value Ref Range    Magnesium 2.13 1.60 - 2.40 mg/dL   Phosphorus   Result Value Ref Range    Phosphorus 2.6 2.5 - 4.9 mg/dL   CBC   Result Value Ref Range    WBC 7.6 4.4 - 11.3 x10*3/uL    nRBC 0.0 0.0 - 0.0 /100 WBCs    RBC 4.12 (L) 4.50 - 5.90 x10*6/uL    Hemoglobin 12.9 (L) 13.5 - 17.5 g/dL    Hematocrit 39.7 (L) 41.0 - 52.0 %    MCV 96 80 - 100 fL    MCH 31.3 26.0 - 34.0 pg    MCHC 32.5 32.0 - 36.0 g/dL    RDW 13.2 11.5 - 14.5 %    Platelets 302 150 - 450 x10*3/uL   Basic metabolic panel   Result Value Ref Range    Glucose 105 (H) 74 - 99 mg/dL    Sodium 139 136 - 145 mmol/L    Potassium 3.6 3.5 - 5.3 mmol/L    Chloride 103 98 - 107 mmol/L    Bicarbonate 28 21 - 32 mmol/L    Anion Gap 12 10 - 20 mmol/L    Urea Nitrogen 14 6 - 23 mg/dL    Creatinine 1.01 0.50 - 1.30 mg/dL    eGFR 82 >60 mL/min/1.73m*2    Calcium 8.4 (L) 8.6 - 10.3 mg/dL   Magnesium   Result Value Ref Range    Magnesium 2.06 1.60 - 2.40 mg/dL   Phosphorus   Result Value Ref Range    Phosphorus 3.0 2.5 - 4.9 mg/dL       Assessment/Plan                  Principal Problem:    Infected hernioplasty mesh, initial encounter (CMS-[facility])  Active Problems:    Atrial fibrillation (Multi)    Benign essential hypertension    Chronic heart failure with preserved ejection fraction (Multi)    NA (obstructive sleep apnea)      Infected hernioplasty mesh  S/p removal of mesh and lap sb resection w post op ileus    Pt appears well hydrated on IVF, lytes are stable    Bowel function trending to improvement    NGT management per  surgery    Permanent Afib  Rate is controlled  Pt on lovenox for now, was xarelto at home    Benign essential hypertension  Stable continue to monitor    Chronic diastolic CHF  Appears compensated at this time    NA  Hs CPAP                    Michel Vargas, APRN-CNP

## 2024-04-29 NOTE — PROGRESS NOTES
Rambo Daigle was referred to the Clinical Pharmacy Team to complete a virtual Transitions of Care encounter for discharge medication optimization. The patient was referred for their [disease state(s)]. The primary goal of this encounter is to ensure the patient's medications are both clinically appropriate and financially feasible for the patient. Pharmacy clinical interventions were provided as noted below.     Attending: Ty Torres  _______________________________________________________________________  PHARMACY ASSESSMENT    Meds to beds? [yes]   Home Pharmacy: [id number] - [place] [id number]   Allergies Reviewed? [yes]    No issues reported in regards to [accessibility, affordability, adherence, adverse effects, or organization]  _______________________________________________________________________  CHRONIC HEART FAILURE ASSESSMENT  -ASCVD 10 year risk score: approx. 13% (LDL is too low to properly calculate)  -HTN present/diagnosed: [yes]    LVEF: not identified in chart  eGFR: 82  Beta blocker: no  ACEi/ARB: no  Entresto: no   MRA: no à (furosemide 20mg daily)  SGLT2i: no  _______________________________________________________________________   PATIENT EDUCATION/GOALS  - Fasting B - 130 mg/dL  - Postprandial BG: less than 180 mg/dL  - A1c: less than 7%  - LDL Goal: < 70mg/dL  - Answered all patient questions and concerns    - Your blood pressure goal is less than 130/80 mmHg  - Try to integrate exercise into your weekly routine. The recommended exercise regimen is 30-40 minutes of moderate-intensity exercise (such as jogging, biking, swimming, etc.) for 5 days a week.  - Try to continue eating healthy, balanced, appropriately portioned meals 3 times a day. The DASH diet is the recommended diet plan for patients with high blood pressure.  -Limit salt intake and avoid foods high in sodium such as processed meats, salty snacks, and fast food. The recommended daily maximum sodium  intake is less than 2,300mg (2 teaspoons) per day.  -Limit cream/sugar additions to coffee and avoid unhealthy caffeinated drinks such as energy drinks or soda.   _______________________________________________________________________  RECOMMENDATIONS/PLAN  Please send prescriptions to [facility] pharmacy for assistance on insurance prior authorization and copay. Prescriptions will be delivered to the patient's bedside prior to discharge with the Meds to Beds program.   Recommend starting [Insert med and dosing strategy]  Daily aspirin 81mg therapy may be appropriate  Determine is ACE/ARB or Entresto would benefit patient more at this time  Continuation of care will be provided by the outpatient clinical pharmacy team in collaboration with the patient's  Primary Care Provider     Verbal consent to manage patient's drug therapy was obtained from the patient. They were informed they may decline to participate or withdraw from participation in pharmacy services at any time.

## 2024-04-29 NOTE — PROGRESS NOTES
"GENERAL SURGERY PROGRESS NOTE    Rambo Daigle   1960   [id number]     Rambo Daigle is a 64 y.o. male on day 6 of admission presenting with Infected hernioplasty mesh, initial encounter (CMS-[facility]).    Exploration Laparotomy small bowel resection with removal of mesh on 4/25/24, 4 Days Post-Op    Subjective  PT MICHAEL, had 2.3 L NG output recorded with decreased output over the last 8 hours. Patient reports no bloating, is passing gas, and had two large BMs    Review of Systems:  Review of Systems   Constitutional:  Negative for chills and fever.   Respiratory:  Negative for chest tightness and shortness of breath.    Cardiovascular:  Negative for chest pain and leg swelling.   Gastrointestinal:  Negative for abdominal pain and nausea.   Genitourinary:  Negative for difficulty urinating.   Musculoskeletal:  Negative for joint swelling.   Neurological:  Negative for headaches.       Objective    Last Recorded Vitals  Blood pressure 129/83, pulse 75, temperature 36.1 °C (97 °F), temperature source Temporal, resp. rate 17, height 1.956 m (6' 5\"), weight (!) 153 kg (336 lb 12.8 oz), SpO2 97%.    Intake/Output last 3 Shifts:  I/O last 3 completed shifts:  In: 3085.4 (20.2 mL/kg) [P.O.:150; I.V.:2555.4 (16.7 mL/kg); NG/GT:80; IV Piggyback:300]  Out: 3950 (25.9 mL/kg) [Urine:200 (0 mL/kg/hr); Emesis/NG output:3750]  Weight: 152.8 kg     Intake/Output Summary (Last 24 hours) at 4/29/2024 1108  Last data filed at 4/29/2024 0903  Gross per 24 hour   Intake 3068.76 ml   Output 2950 ml   Net 118.76 ml       Physical Exam  Vitals reviewed.   Constitutional:       General: He is not in acute distress.  HENT:      Head: Normocephalic and atraumatic.   Eyes:      Extraocular Movements: Extraocular movements intact.      Conjunctiva/sclera: Conjunctivae normal.   Cardiovascular:      Rate and Rhythm: Normal rate and regular rhythm.      Pulses: Normal pulses.   Pulmonary:      Effort: Pulmonary effort is normal. No respiratory distress. "   Abdominal:      Comments: Soft, reports distension at baseline which is mild, non tympanic, non tender, does have minimal serous drainage along midline incision.    Musculoskeletal:         General: Normal range of motion.      Cervical back: Neck supple.   Skin:     General: Skin is warm and dry.   Neurological:      General: No focal deficit present.      Mental Status: He is alert and oriented to person, place, and time.         Relevant Results  Labs:  Results for orders placed or performed during the hospital encounter of 04/23/24 (from the past 24 hour(s))   CBC   Result Value Ref Range    WBC 7.6 4.4 - 11.3 x10*3/uL    nRBC 0.0 0.0 - 0.0 /100 WBCs    RBC 4.12 (L) 4.50 - 5.90 x10*6/uL    Hemoglobin 12.9 (L) 13.5 - 17.5 g/dL    Hematocrit 39.7 (L) 41.0 - 52.0 %    MCV 96 80 - 100 fL    MCH 31.3 26.0 - 34.0 pg    MCHC 32.5 32.0 - 36.0 g/dL    RDW 13.2 11.5 - 14.5 %    Platelets 302 150 - 450 x10*3/uL   Basic metabolic panel   Result Value Ref Range    Glucose 105 (H) 74 - 99 mg/dL    Sodium 139 136 - 145 mmol/L    Potassium 3.6 3.5 - 5.3 mmol/L    Chloride 103 98 - 107 mmol/L    Bicarbonate 28 21 - 32 mmol/L    Anion Gap 12 10 - 20 mmol/L    Urea Nitrogen 14 6 - 23 mg/dL    Creatinine 1.01 0.50 - 1.30 mg/dL    eGFR 82 >60 mL/min/1.73m*2    Calcium 8.4 (L) 8.6 - 10.3 mg/dL   Magnesium   Result Value Ref Range    Magnesium 2.06 1.60 - 2.40 mg/dL   Phosphorus   Result Value Ref Range    Phosphorus 3.0 2.5 - 4.9 mg/dL       Images:  XR abdomen 1 view   Final Result   1.  Mild gaseous distention of small bowel loops   2. Nasogastric tube overlies the left upper abdomen with the side   hole possibly in the esophagus. Recommend advancing the nasogastric   tube approximately 8 cm and repeating the x-ray centered on the upper   abdomen/lung bases to better evaluate tube placement.        MACRO:   None        Signed by: Cheryle Jackson 4/27/2024 10:39 AM   Dictation workstation:   [id number]      CT abdomen pelvis w IV  contrast   Final Result   Cellulitis around the umbilicus        Along the umbilical tract deeper from the skin, probable 3 x 1.8 x   1.7 cm fluid abscess        The gas bubble I have annotated on axial image 105 and another on   axial 108 are superficial enough they could theoretically have been   pushed into the umbilical tract from outside        On the other hand, a cluster of gas bubbles I have annotated on axial   102 are deep enough to be immediately superficial to the mesh hernia   repair material. These bubbles are more alarming that there may be a   developing sinus tract from the umbilicus into the peritoneal space,   even potentially a burgeoning enterocutaneous fistula. Note one or   two segments of small bowel passed directly deep to and adjacent to   the left lateral margin of the hernia mesh where the deeper gas   bubbles extend. Perhaps the gas bubbles on axial 102 are from a   partially collapsed short segment of small bowel that has passed   ventral to the mesh        Note in the subcutaneous fat to the right of at, and just above the   umbilical cellulitis, there are two subcutaneous foreign bodies, one   approximately 18 mm long on sagittal 97, the other approximately 11   mm long on sagittal 95/96. The curvilinear shape suggests they could   be suture needles, although the attenuation is fairly low. Note also   the round, much higher density foreign body in the subcutaneous fat   to the left of and well below the umbilicus on axial image 120; this   one resembles a ballistic fragment or BB        No other acute findings        MACRO:   None        Signed by: Rambo Higginbotham 4/23/2024 2:42 PM   Dictation workstation:   [id number]          Assessment and Plan  Principal Problem:    Infected hernioplasty mesh, initial encounter (CMS-[facility])  Active Problems:    Atrial fibrillation (Multi)    Benign essential hypertension    Chronic heart failure with preserved ejection fraction (Multi)    NA  (obstructive sleep apnea)    64 y.o. male with history of Afib on Xarelto presenting with umbilical erythema and drainage due to infected prior hernia repair mesh now status post exploratory laparotomy with explantation of mesh and small bowel resection on 4/25/24 with ileus.    Plan:  -Continue pain control and anti-emetics  -NG tube to LIS, monitor output  -NPO with IVF  -Xarelto held d/t NPO, LVX per IMS  -Continue IV abx: zosyn  -Encourage ambulation and IS use  -SCDs, LVX    Discussed with attending Dr. Melissa Keller DO, PGY-2  General Surgery

## 2024-04-29 NOTE — DOCUMENTATION CLARIFICATION NOTE
"    PATIENT:               GERSON SUÁREZ  ACCT #:                  [id number]  MRN:                       [id number]  :                       1960  ADMIT DATE:       2024 11:29 AM  DISCH DATE:  RESPONDING PROVIDER #:        [id number]          PROVIDER RESPONSE TEXT:    Bleeding around naval due to or enhanced by Xarelto    CDI QUERY TEXT:    Clarification        Instruction:    Based on your assessment of the patient and the clinical information, please provide the requested documentation by clicking on the appropriate radio button and enter any additional information if prompted.    Question: Please further clarify if a relationship exists between Xarelto and bleeding    When answering this query, please exercise your independent professional judgment. The fact that a question is being asked, does not imply that any particular answer is desired or expected.    The patient's clinical indicators include:  Clinical Information: 64 y.o. male presenting with Infected hernioplasty mesh s/p small bowel resection and removal of mesh. Patient with a history of a fib on Xarelto.    Clinical Indicators:  ED note: \"  Patient presents with hernia with bleeding around navel.\"  Patient on Xarelto 1 tablet (20 mg) daily.    Treatment: Xarelto held    Risk Factors: On Xarelto  Options provided:  -- Bleeding around naval due to or enhanced by Xarelto  -- Bleeding around naval not related to Xarelto  -- Other, Please specify additional information below  -- Other - I will add my own diagnosis  -- Refer to Clinical Documentation Reviewer    Query created by: Cheryle Vargas on 2024 8:28 AM      Electronically signed by:  KIMBERLY RENNER PA-C 2024 1:44 PM          "

## 2024-04-29 NOTE — CARE PLAN
Problem: Pain  Goal: My pain/discomfort is manageable  Outcome: Progressing     Problem: Daily Care  Goal: Daily care needs are met  Outcome: Progressing     Problem: Discharge Barriers  Goal: My discharge needs are met  Outcome: Progressing     Problem: Safety  Goal: Patient will be injury free during hospitalization  Outcome: Progressing  Goal: I will remain free of falls  Outcome: Progressing     Problem: Psychosocial Needs  Goal: Demonstrates ability to cope with hospitalization/illness  Outcome: Progressing  Goal: Collaborate with me, my family, and caregiver to identify my specific goals  Outcome: Progressing     Problem: Pain  Goal: Takes deep breaths with improved pain control throughout the shift  Outcome: Progressing  Goal: Turns in bed with improved pain control throughout the shift  Outcome: Progressing  Goal: Walks with improved pain control throughout the shift  Outcome: Progressing  Goal: Performs ADL's with improved pain control throughout shift  Outcome: Progressing  Goal: Participates in PT with improved pain control throughout the shift  Outcome: Progressing  Goal: Free from opioid side effects throughout the shift  Outcome: Progressing  Goal: Free from acute confusion related to pain meds throughout the shift  Outcome: Progressing     Problem: Pain - Adult  Goal: Verbalizes/displays adequate comfort level or baseline comfort level  Outcome: Progressing     Problem: Safety - Adult  Goal: Free from fall injury  Outcome: Progressing     Problem: Discharge Planning  Goal: Discharge to home or other facility with appropriate resources  Outcome: Progressing     Problem: Chronic Conditions and Co-morbidities  Goal: Patient's chronic conditions and co-morbidity symptoms are monitored and maintained or improved  Outcome: Progressing   The patient's goals for the shift include      The clinical goals for the shift include pt safety maintained       ----- Message from [facility] sent at 1/3/2019 10:03 AM CST -----  Please call patient. PAP did show atypical squamous cells of undetermined significance however she was negative for HPV. According to the [employer] patient will need to repeat testing in 3 years.

## 2024-04-30 LAB
ANION GAP SERPL CALC-SCNC: 12 MMOL/L (ref 10–20)
BASOPHILS # BLD AUTO: 0.06 X10*3/UL (ref 0–0.1)
BASOPHILS NFR BLD AUTO: 0.9 %
BUN SERPL-MCNC: 15 MG/DL (ref 6–23)
CALCIUM SERPL-MCNC: 8.7 MG/DL (ref 8.6–10.3)
CHLORIDE SERPL-SCNC: 105 MMOL/L (ref 98–107)
CO2 SERPL-SCNC: 26 MMOL/L (ref 21–32)
CREAT SERPL-MCNC: 0.99 MG/DL (ref 0.5–1.3)
EGFRCR SERPLBLD CKD-EPI 2021: 84 ML/MIN/1.73M*2
EOSINOPHIL # BLD AUTO: 0.34 X10*3/UL (ref 0–0.7)
EOSINOPHIL NFR BLD AUTO: 5.4 %
ERYTHROCYTE [DISTWIDTH] IN BLOOD BY AUTOMATED COUNT: 12.9 % (ref 11.5–14.5)
GLUCOSE SERPL-MCNC: 114 MG/DL (ref 74–99)
HCT VFR BLD AUTO: 40.7 % (ref 41–52)
HGB BLD-MCNC: 13.9 G/DL (ref 13.5–17.5)
IMM GRANULOCYTES # BLD AUTO: 0.05 X10*3/UL (ref 0–0.7)
IMM GRANULOCYTES NFR BLD AUTO: 0.8 % (ref 0–0.9)
LYMPHOCYTES # BLD AUTO: 1.07 X10*3/UL (ref 1.2–4.8)
LYMPHOCYTES NFR BLD AUTO: 16.9 %
MAGNESIUM SERPL-MCNC: 2 MG/DL (ref 1.6–2.4)
MCH RBC QN AUTO: 32.2 PG (ref 26–34)
MCHC RBC AUTO-ENTMCNC: 34.2 G/DL (ref 32–36)
MCV RBC AUTO: 94 FL (ref 80–100)
MONOCYTES # BLD AUTO: 0.57 X10*3/UL (ref 0.1–1)
MONOCYTES NFR BLD AUTO: 9 %
NEUTROPHILS # BLD AUTO: 4.25 X10*3/UL (ref 1.2–7.7)
NEUTROPHILS NFR BLD AUTO: 67 %
NRBC BLD-RTO: 0 /100 WBCS (ref 0–0)
PLATELET # BLD AUTO: 327 X10*3/UL (ref 150–450)
POTASSIUM SERPL-SCNC: 3.7 MMOL/L (ref 3.5–5.3)
RBC # BLD AUTO: 4.32 X10*6/UL (ref 4.5–5.9)
SODIUM SERPL-SCNC: 139 MMOL/L (ref 136–145)
WBC # BLD AUTO: 6.3 X10*3/UL (ref 4.4–11.3)

## 2024-04-30 PROCEDURE — 80048 BASIC METABOLIC PNL TOTAL CA: CPT | Performed by: NURSE PRACTITIONER

## 2024-04-30 PROCEDURE — 2500000004 HC RX 250 GENERAL PHARMACY W/ HCPCS (ALT 636 FOR OP/ED)

## 2024-04-30 PROCEDURE — 1100000001 HC PRIVATE ROOM DAILY

## 2024-04-30 PROCEDURE — 2500000004 HC RX 250 GENERAL PHARMACY W/ HCPCS (ALT 636 FOR OP/ED): Performed by: SURGERY

## 2024-04-30 PROCEDURE — 83735 ASSAY OF MAGNESIUM: CPT | Performed by: NURSE PRACTITIONER

## 2024-04-30 PROCEDURE — 85025 COMPLETE CBC W/AUTO DIFF WBC: CPT | Performed by: NURSE PRACTITIONER

## 2024-04-30 PROCEDURE — 2500000004 HC RX 250 GENERAL PHARMACY W/ HCPCS (ALT 636 FOR OP/ED): Performed by: STUDENT IN AN ORGANIZED HEALTH CARE EDUCATION/TRAINING PROGRAM

## 2024-04-30 PROCEDURE — 99232 SBSQ HOSP IP/OBS MODERATE 35: CPT | Performed by: NURSE PRACTITIONER

## 2024-04-30 PROCEDURE — 36415 COLL VENOUS BLD VENIPUNCTURE: CPT | Performed by: NURSE PRACTITIONER

## 2024-04-30 PROCEDURE — 2500000004 HC RX 250 GENERAL PHARMACY W/ HCPCS (ALT 636 FOR OP/ED): Performed by: INTERNAL MEDICINE

## 2024-04-30 PROCEDURE — C9113 INJ PANTOPRAZOLE SODIUM, VIA: HCPCS | Performed by: SURGERY

## 2024-04-30 RX ADMIN — SODIUM CHLORIDE, SODIUM LACTATE, POTASSIUM CHLORIDE, CALCIUM CHLORIDE AND DEXTROSE MONOHYDRATE 125 ML/HR: 5; 600; 310; 30; 20 INJECTION, SOLUTION INTRAVENOUS at 11:27

## 2024-04-30 RX ADMIN — ENOXAPARIN SODIUM 150 MG: 300 INJECTION INTRAVENOUS; SUBCUTANEOUS at 20:32

## 2024-04-30 RX ADMIN — PANTOPRAZOLE SODIUM 40 MG: 40 INJECTION, POWDER, FOR SOLUTION INTRAVENOUS at 06:39

## 2024-04-30 RX ADMIN — HYDROMORPHONE HYDROCHLORIDE 0.2 MG: 0.2 INJECTION, SOLUTION INTRAMUSCULAR; INTRAVENOUS; SUBCUTANEOUS at 23:24

## 2024-04-30 RX ADMIN — PIPERACILLIN SODIUM AND TAZOBACTAM SODIUM 3.38 G: 3; .375 INJECTION, SOLUTION INTRAVENOUS at 12:35

## 2024-04-30 RX ADMIN — PIPERACILLIN SODIUM AND TAZOBACTAM SODIUM 3.38 G: 3; .375 INJECTION, SOLUTION INTRAVENOUS at 06:38

## 2024-04-30 RX ADMIN — ENOXAPARIN SODIUM 150 MG: 300 INJECTION INTRAVENOUS; SUBCUTANEOUS at 09:08

## 2024-04-30 RX ADMIN — SODIUM CHLORIDE, POTASSIUM CHLORIDE, SODIUM LACTATE AND CALCIUM CHLORIDE 500 ML: 600; 310; 30; 20 INJECTION, SOLUTION INTRAVENOUS at 12:35

## 2024-04-30 RX ADMIN — PIPERACILLIN SODIUM AND TAZOBACTAM SODIUM 3.38 G: 3; .375 INJECTION, SOLUTION INTRAVENOUS at 17:42

## 2024-04-30 ASSESSMENT — COGNITIVE AND FUNCTIONAL STATUS - GENERAL
MOBILITY SCORE: 24
DAILY ACTIVITIY SCORE: 24

## 2024-04-30 ASSESSMENT — ENCOUNTER SYMPTOMS
CHILLS: 0
DIFFICULTY URINATING: 0
JOINT SWELLING: 0
NAUSEA: 0
CHEST TIGHTNESS: 0
HEADACHES: 0
SHORTNESS OF BREATH: 0
FEVER: 0
ABDOMINAL PAIN: 0

## 2024-04-30 ASSESSMENT — PAIN SCALES - GENERAL
PAINLEVEL_OUTOF10: 0 - NO PAIN
PAINLEVEL_OUTOF10: 5 - MODERATE PAIN
PAINLEVEL_OUTOF10: 0 - NO PAIN

## 2024-04-30 ASSESSMENT — PAIN - FUNCTIONAL ASSESSMENT
PAIN_FUNCTIONAL_ASSESSMENT: 0-10
PAIN_FUNCTIONAL_ASSESSMENT: 0-10

## 2024-04-30 NOTE — PROGRESS NOTES
"GENERAL SURGERY PROGRESS NOTE    Rambo Daigle   1960   [id number]     Rambo Daigle is a 64 y.o. male on day 7 of admission presenting with Infected hernioplasty mesh, initial encounter (CMS-[facility]).    Exploration Laparotomy small bowel resection with removal of mesh on 4/25/24, 5 Days Post-Op    Subjective  PT MICHAEL, had 2.7 L NG output recorded. Patient reports no bloating, is passing gas, and had multiple BM. Imaging reviewed and NG in adequate position. Patient denies any nausea or emesis, will start with clamp trial    Review of Systems:  Review of Systems   Constitutional:  Negative for chills and fever.   Respiratory:  Negative for chest tightness and shortness of breath.    Cardiovascular:  Negative for chest pain and leg swelling.   Gastrointestinal:  Negative for abdominal pain and nausea.   Genitourinary:  Negative for difficulty urinating.   Musculoskeletal:  Negative for joint swelling.   Neurological:  Negative for headaches.       Objective    Last Recorded Vitals  Blood pressure 126/88, pulse 81, temperature 36.1 °C (97 °F), temperature source Temporal, resp. rate 18, height 1.956 m (6' 5\"), weight (!) 153 kg (336 lb 12.8 oz), SpO2 96%.    Intake/Output last 3 Shifts:  I/O last 3 completed shifts:  In: 5674.6 (37.1 mL/kg) [P.O.:50; I.V.:4584.6 (30 mL/kg); NG/GT:290; IV Piggyback:750]  Out: 5850 (38.3 mL/kg) [Urine:1600 (0.3 mL/kg/hr); Emesis/NG output:4250]  Weight: 152.8 kg     Intake/Output Summary (Last 24 hours) at 4/30/2024 1526  Last data filed at 4/30/2024 1451  Gross per 24 hour   Intake 3608.34 ml   Output 2450 ml   Net 1158.34 ml       Physical Exam  Vitals reviewed.   Constitutional:       General: He is not in acute distress.  HENT:      Head: Normocephalic and atraumatic.   Eyes:      Extraocular Movements: Extraocular movements intact.      Conjunctiva/sclera: Conjunctivae normal.   Cardiovascular:      Rate and Rhythm: Normal rate and regular rhythm.      Pulses: Normal pulses. "   Pulmonary:      Effort: Pulmonary effort is normal. No respiratory distress.   Abdominal:      Comments: Soft, reports distension at baseline which is mild, non tympanic, non tender, does have minimal serous drainage along midline incision. NG with 2.7 L bilious output in canister   Musculoskeletal:         General: Normal range of motion.      Cervical back: Neck supple.   Skin:     General: Skin is warm and dry.   Neurological:      General: No focal deficit present.      Mental Status: He is alert and oriented to person, place, and time.         Relevant Results  Labs:  Results for orders placed or performed during the hospital encounter of 04/23/24 (from the past 24 hour(s))   Basic Metabolic Panel   Result Value Ref Range    Glucose 114 (H) 74 - 99 mg/dL    Sodium 139 136 - 145 mmol/L    Potassium 3.7 3.5 - 5.3 mmol/L    Chloride 105 98 - 107 mmol/L    Bicarbonate 26 21 - 32 mmol/L    Anion Gap 12 10 - 20 mmol/L    Urea Nitrogen 15 6 - 23 mg/dL    Creatinine 0.99 0.50 - 1.30 mg/dL    eGFR 84 >60 mL/min/1.73m*2    Calcium 8.7 8.6 - 10.3 mg/dL   CBC and Auto Differential   Result Value Ref Range    WBC 6.3 4.4 - 11.3 x10*3/uL    nRBC 0.0 0.0 - 0.0 /100 WBCs    RBC 4.32 (L) 4.50 - 5.90 x10*6/uL    Hemoglobin 13.9 13.5 - 17.5 g/dL    Hematocrit 40.7 (L) 41.0 - 52.0 %    MCV 94 80 - 100 fL    MCH 32.2 26.0 - 34.0 pg    MCHC 34.2 32.0 - 36.0 g/dL    RDW 12.9 11.5 - 14.5 %    Platelets 327 150 - 450 x10*3/uL    Neutrophils % 67.0 40.0 - 80.0 %    Immature Granulocytes %, Automated 0.8 0.0 - 0.9 %    Lymphocytes % 16.9 13.0 - 44.0 %    Monocytes % 9.0 2.0 - 10.0 %    Eosinophils % 5.4 0.0 - 6.0 %    Basophils % 0.9 0.0 - 2.0 %    Neutrophils Absolute 4.25 1.20 - 7.70 x10*3/uL    Immature Granulocytes Absolute, Automated 0.05 0.00 - 0.70 x10*3/uL    Lymphocytes Absolute 1.07 (L) 1.20 - 4.80 x10*3/uL    Monocytes Absolute 0.57 0.10 - 1.00 x10*3/uL    Eosinophils Absolute 0.34 0.00 - 0.70 x10*3/uL    Basophils Absolute  0.06 0.00 - 0.10 x10*3/uL   Magnesium   Result Value Ref Range    Magnesium 2.00 1.60 - 2.40 mg/dL       Images:  XR abdomen 1 view   Final Result   1.  Mild gaseous distention of small bowel loops   2. Nasogastric tube overlies the left upper abdomen with the side   hole possibly in the esophagus. Recommend advancing the nasogastric   tube approximately 8 cm and repeating the x-ray centered on the upper   abdomen/lung bases to better evaluate tube placement.        MACRO:   None        Signed by: Cheryle Jackson 4/27/2024 10:39 AM   Dictation workstation:   [id number]      CT abdomen pelvis w IV contrast   Final Result   Cellulitis around the umbilicus        Along the umbilical tract deeper from the skin, probable 3 x 1.8 x   1.7 cm fluid abscess        The gas bubble I have annotated on axial image 105 and another on   axial 108 are superficial enough they could theoretically have been   pushed into the umbilical tract from outside        On the other hand, a cluster of gas bubbles I have annotated on axial   102 are deep enough to be immediately superficial to the mesh hernia   repair material. These bubbles are more alarming that there may be a   developing sinus tract from the umbilicus into the peritoneal space,   even potentially a burgeoning enterocutaneous fistula. Note one or   two segments of small bowel passed directly deep to and adjacent to   the left lateral margin of the hernia mesh where the deeper gas   bubbles extend. Perhaps the gas bubbles on axial 102 are from a   partially collapsed short segment of small bowel that has passed   ventral to the mesh        Note in the subcutaneous fat to the right of at, and just above the   umbilical cellulitis, there are two subcutaneous foreign bodies, one   approximately 18 mm long on sagittal 97, the other approximately 11   mm long on sagittal 95/96. The curvilinear shape suggests they could   be suture needles, although the attenuation is fairly low. Note  also   the round, much higher density foreign body in the subcutaneous fat   to the left of and well below the umbilicus on axial image 120; this   one resembles a ballistic fragment or BB        No other acute findings        MACRO:   None        Signed by: Rambo Higginbotham 4/23/2024 2:42 PM   Dictation workstation:   [id number]          Assessment and Plan  Principal Problem:    Infected hernioplasty mesh, initial encounter (CMS-HCC)  Active Problems:    Atrial fibrillation (Multi)    Benign essential hypertension    Chronic heart failure with preserved ejection fraction (Multi)    NA (obstructive sleep apnea)    64 y.o. male with history of Afib on Xarelto presenting with umbilical erythema and drainage due to infected prior hernia repair mesh now status post exploratory laparotomy with explantation of mesh and small bowel resection on 4/25/24 with ileus.    Plan:  -Continue pain control and anti-emetics  -Start clamp trials with NG  -NPO with IVF  -Xarelto held d/t NPO, LVX per IMS  -Continue IV abx: zosyn  -Encourage ambulation and IS use  -SCDs, LVX    Discussed with attending Dr. Melissa Keller DO, PGY-2  General Surgery

## 2024-04-30 NOTE — CARE PLAN
The patient's goals for the shift include  pt will remain free of injury       The clinical goals for the shift include pt will remain hemodynamically stable

## 2024-04-30 NOTE — PROGRESS NOTES
Rambo Daigle is a 64 y.o. male on day 7 of admission presenting with Infected hernioplasty mesh, initial encounter (CMS-[facility]).      Subjective   Rambo Daigle is a 64 y.o. male with Afib, HFpEF, HTN, venous insufficiency, COPD, NA, GERD, lumbar radiculopathy, obesity, umbilical hernia s/p repair presented to ED for abdominal pain. 1 week of swelling and pain around umbilicus. redness and bloody drainage. no f/c. no other recent sx. exercise tolerance fair, able to walk >1 mi and 1-2 flights of stairs. no orthopnea or exertional cp. VSS. umbilical erythema on exam. labs/imaging all wnl. ekg w/ afib, anterior q-waves. ct a/p w/ abscess and potential enterocutaneous fistula surrounding surgical mesh. s/p vanc, zosyn, protonix. admitted to surgery.     RCRI class IV, which carries an 11% risk of major CV complications, though patient able to achieve > 4 METS of activity routinely. Patient's other comorbid conditions are stable. Therefore there are no medical contraindications currently to surgery      4/26/24: Acute events overnight.  Vital stable.  Labs unremarkable.  No leukocytosis, does have slight anemia.  He is s/p Exploration Laparotomy small bowel resection with removal of mesh on 4/25/24. Remains on Zosyn. NPO at time of evaluation, not yet having flatus or Bms post-op, +burping.      4/27/2024: Patient has no passing gas. Surgery team concerned about ileus and placed NGT with suction. Patient has no acute complains of abdominal pain or vomiting. Start lovenox 1 mg/kg for Patient is unable to take oral Xarelto.     4/28/2024: Exploration Laparotomy small bowel resection with removal of mesh on 4/25/24 POD #3. Patient remained on NGT with low suction with 2 L dark green fluid. Patient has no nausea or vomiting but abdomen remained distended with decreased bowel sound.    4/29/2024 No significant events overnight, now PD#4 pt had Bm x 2 and continues to have  Flatus, Pain is controlled, occasional cough, no cp or  palpitations. Anticipate clamping of NGT today, pt encouraged to ambulate.     4.30.2024 pt with continued flatus no N/V or abdominal pain.  excessive NGT output per surgery. He continues to ambulate, taking in Ice chips no cp, sob or orthopnea.        Objective     Last Recorded Vitals  /86 (BP Location: Left arm, Patient Position: Lying)   Pulse 84   Temp 36.4 °C (97.6 °F) (Temporal)   Resp 18   Wt (!) 153 kg (336 lb 12.8 oz)   SpO2 94%   Intake/Output last 3 Shifts:    Intake/Output Summary (Last 24 hours) at 4/30/2024 0951  Last data filed at 4/30/2024 0904  Gross per 24 hour   Intake 2925.83 ml   Output 3950 ml   Net -1024.17 ml       Admission Weight  Weight: (!) 154 kg (340 lb) (04/23/24 1058)    Daily Weight  04/23/24 : (!) 153 kg (336 lb 12.8 oz)    Image Results  XR abdomen 1 view  Narrative: Interpreted By:  Cheryle Jackson,   STUDY:  XR ABDOMEN 1 VIEW;  4/27/2024 9:43 am      INDICATION:  Signs/Symptoms:NG tube placement.      COMPARISON:  None.      ACCESSION NUMBER(S):  [id number]      ORDERING CLINICIAN:  COLIN NAJERA      FINDINGS:  Supine views of the abdomen show a nasogastric tube overlying the  left upper abdomen with the side hole possibly in the distal  esophagus. Recommend advancing the nasogastric tube and repeating the  x-ray centered on the abdomen.      There is mild gaseous distention of small-bowel loops. Skin staples  overlie the lower abdomen. There are bilateral hip joint replacements  noted. Degenerative changes are seen in the spine.      Impression: 1.  Mild gaseous distention of small bowel loops  2. Nasogastric tube overlies the left upper abdomen with the side  hole possibly in the esophagus. Recommend advancing the nasogastric  tube approximately 8 cm and repeating the x-ray centered on the upper  abdomen/lung bases to better evaluate tube placement.      MACRO:  None      Signed by: Cheryle Jackson 4/27/2024 10:39 AM  Dictation workstation:    [id number]      Physical Exam  Constitutional:       General: He is not in acute distress.     Appearance: Normal appearance.   HENT:      Head: Normocephalic.      Mouth/Throat:      Mouth: Mucous membranes are moist.      Pharynx: Oropharynx is clear.   Eyes:      Pupils: Pupils are equal, round, and reactive to light.   Cardiovascular:      Rate and Rhythm: Normal rate. Rhythm irregular.      Heart sounds: Normal heart sounds. No murmur heard.     No gallop.   Pulmonary:      Effort: Pulmonary effort is normal.      Breath sounds: Normal breath sounds.   Abdominal:      General: There is distension.      Palpations: Abdomen is soft.      Tenderness: There is no abdominal tenderness. There is no guarding.   Musculoskeletal:         General: No swelling. Normal range of motion.      Cervical back: Neck supple. No rigidity.   Skin:     General: Skin is warm and dry.   Neurological:      General: No focal deficit present.      Mental Status: He is alert.      Cranial Nerves: No cranial nerve deficit.      Sensory: No sensory deficit.   Psychiatric:         Mood and Affect: Mood normal.         Behavior: Behavior normal.         Relevant Results             Scheduled medications  dilTIAZem CD, 120 mg, oral, Daily  enoxaparin, 1 mg/kg, subcutaneous, BID  [Held by provider] furosemide, 20 mg, oral, Daily  pantoprazole, 40 mg, oral, Daily before breakfast   Or  pantoprazole, 40 mg, intravenous, Daily before breakfast  piperacillin-tazobactam, 3.375 g, intravenous, q6h  pneumococcal conjugate, 0.5 mL, intramuscular, During hospitalization  [Held by provider] rivaroxaban, 20 mg, oral, Daily      Continuous medications  dextrose 5 % and lactated Ringer's, 125 mL/hr, Last Rate: 125 mL/hr (04/30/24 0904)      PRN medications  PRN medications: acetaminophen **OR** acetaminophen **OR** acetaminophen, benzocaine-menthol, hydrALAZINE, HYDROmorphone, ipratropium-albuteroL, ondansetron **OR** ondansetron, phenoL  Results for  orders placed or performed during the hospital encounter of 04/23/24 (from the past 96 hour(s))   CBC   Result Value Ref Range    WBC 10.1 4.4 - 11.3 x10*3/uL    nRBC 0.0 0.0 - 0.0 /100 WBCs    RBC 4.64 4.50 - 5.90 x10*6/uL    Hemoglobin 14.7 13.5 - 17.5 g/dL    Hematocrit 43.8 41.0 - 52.0 %    MCV 94 80 - 100 fL    MCH 31.7 26.0 - 34.0 pg    MCHC 33.6 32.0 - 36.0 g/dL    RDW 13.2 11.5 - 14.5 %    Platelets 315 150 - 450 x10*3/uL   Basic metabolic panel   Result Value Ref Range    Glucose 108 (H) 74 - 99 mg/dL    Sodium 135 (L) 136 - 145 mmol/L    Potassium 5.1 3.5 - 5.3 mmol/L    Chloride 102 98 - 107 mmol/L    Bicarbonate 21 21 - 32 mmol/L    Anion Gap 17 10 - 20 mmol/L    Urea Nitrogen 9 6 - 23 mg/dL    Creatinine 1.06 0.50 - 1.30 mg/dL    eGFR 77 >60 mL/min/1.73m*2    Calcium 9.3 8.6 - 10.3 mg/dL   Magnesium   Result Value Ref Range    Magnesium 2.17 1.60 - 2.40 mg/dL   Phosphorus   Result Value Ref Range    Phosphorus 2.4 (L) 2.5 - 4.9 mg/dL   CBC   Result Value Ref Range    WBC 8.6 4.4 - 11.3 x10*3/uL    nRBC 0.0 0.0 - 0.0 /100 WBCs    RBC 4.33 (L) 4.50 - 5.90 x10*6/uL    Hemoglobin 13.7 13.5 - 17.5 g/dL    Hematocrit 41.4 41.0 - 52.0 %    MCV 96 80 - 100 fL    MCH 31.6 26.0 - 34.0 pg    MCHC 33.1 32.0 - 36.0 g/dL    RDW 13.3 11.5 - 14.5 %    Platelets 326 150 - 450 x10*3/uL   Basic Metabolic Panel   Result Value Ref Range    Glucose 110 (H) 74 - 99 mg/dL    Sodium 139 136 - 145 mmol/L    Potassium 3.8 3.5 - 5.3 mmol/L    Chloride 103 98 - 107 mmol/L    Bicarbonate 26 21 - 32 mmol/L    Anion Gap 14 10 - 20 mmol/L    Urea Nitrogen 12 6 - 23 mg/dL    Creatinine 1.01 0.50 - 1.30 mg/dL    eGFR 82 >60 mL/min/1.73m*2    Calcium 8.8 8.6 - 10.3 mg/dL   Magnesium   Result Value Ref Range    Magnesium 2.13 1.60 - 2.40 mg/dL   Phosphorus   Result Value Ref Range    Phosphorus 2.6 2.5 - 4.9 mg/dL   CBC   Result Value Ref Range    WBC 7.6 4.4 - 11.3 x10*3/uL    nRBC 0.0 0.0 - 0.0 /100 WBCs    RBC 4.12 (L) 4.50 - 5.90  x10*6/uL    Hemoglobin 12.9 (L) 13.5 - 17.5 g/dL    Hematocrit 39.7 (L) 41.0 - 52.0 %    MCV 96 80 - 100 fL    MCH 31.3 26.0 - 34.0 pg    MCHC 32.5 32.0 - 36.0 g/dL    RDW 13.2 11.5 - 14.5 %    Platelets 302 150 - 450 x10*3/uL   Basic metabolic panel   Result Value Ref Range    Glucose 105 (H) 74 - 99 mg/dL    Sodium 139 136 - 145 mmol/L    Potassium 3.6 3.5 - 5.3 mmol/L    Chloride 103 98 - 107 mmol/L    Bicarbonate 28 21 - 32 mmol/L    Anion Gap 12 10 - 20 mmol/L    Urea Nitrogen 14 6 - 23 mg/dL    Creatinine 1.01 0.50 - 1.30 mg/dL    eGFR 82 >60 mL/min/1.73m*2    Calcium 8.4 (L) 8.6 - 10.3 mg/dL   Magnesium   Result Value Ref Range    Magnesium 2.06 1.60 - 2.40 mg/dL   Phosphorus   Result Value Ref Range    Phosphorus 3.0 2.5 - 4.9 mg/dL   Basic Metabolic Panel   Result Value Ref Range    Glucose 114 (H) 74 - 99 mg/dL    Sodium 139 136 - 145 mmol/L    Potassium 3.7 3.5 - 5.3 mmol/L    Chloride 105 98 - 107 mmol/L    Bicarbonate 26 21 - 32 mmol/L    Anion Gap 12 10 - 20 mmol/L    Urea Nitrogen 15 6 - 23 mg/dL    Creatinine 0.99 0.50 - 1.30 mg/dL    eGFR 84 >60 mL/min/1.73m*2    Calcium 8.7 8.6 - 10.3 mg/dL   CBC and Auto Differential   Result Value Ref Range    WBC 6.3 4.4 - 11.3 x10*3/uL    nRBC 0.0 0.0 - 0.0 /100 WBCs    RBC 4.32 (L) 4.50 - 5.90 x10*6/uL    Hemoglobin 13.9 13.5 - 17.5 g/dL    Hematocrit 40.7 (L) 41.0 - 52.0 %    MCV 94 80 - 100 fL    MCH 32.2 26.0 - 34.0 pg    MCHC 34.2 32.0 - 36.0 g/dL    RDW 12.9 11.5 - 14.5 %    Platelets 327 150 - 450 x10*3/uL    Neutrophils % 67.0 40.0 - 80.0 %    Immature Granulocytes %, Automated 0.8 0.0 - 0.9 %    Lymphocytes % 16.9 13.0 - 44.0 %    Monocytes % 9.0 2.0 - 10.0 %    Eosinophils % 5.4 0.0 - 6.0 %    Basophils % 0.9 0.0 - 2.0 %    Neutrophils Absolute 4.25 1.20 - 7.70 x10*3/uL    Immature Granulocytes Absolute, Automated 0.05 0.00 - 0.70 x10*3/uL    Lymphocytes Absolute 1.07 (L) 1.20 - 4.80 x10*3/uL    Monocytes Absolute 0.57 0.10 - 1.00 x10*3/uL     Eosinophils Absolute 0.34 0.00 - 0.70 x10*3/uL    Basophils Absolute 0.06 0.00 - 0.10 x10*3/uL   Magnesium   Result Value Ref Range    Magnesium 2.00 1.60 - 2.40 mg/dL       Assessment/Plan                  Principal Problem:    Infected hernioplasty mesh, initial encounter (CMS-[facility])  Active Problems:    Atrial fibrillation (Multi)    Benign essential hypertension    Chronic heart failure with preserved ejection fraction (Multi)    NA (obstructive sleep apnea)      Infected hernioplasty mesh  S/p removal of mesh and lap sb resection w post op ileus    Pt appears well hydrated on IVF, lytes are stable    Bowel function trending to improvement    NGT management per surgery    Permanent Afib  Rate is controlled  Pt on lovenox for now, was xarelto at home    Benign essential hypertension  Stable continue to monitor    Chronic diastolic CHF  Appears compensated at this time    NA  Hs CPAP                    Michel Vargas, APRN-CNP

## 2024-04-30 NOTE — CARE PLAN
Problem: Pain  Goal: My pain/discomfort is manageable  Outcome: Progressing     Problem: Daily Care  Goal: Daily care needs are met  Outcome: Progressing     Problem: Discharge Barriers  Goal: My discharge needs are met  Outcome: Progressing     Problem: Safety  Goal: Patient will be injury free during hospitalization  Outcome: Progressing  Goal: I will remain free of falls  Outcome: Progressing     Problem: Psychosocial Needs  Goal: Demonstrates ability to cope with hospitalization/illness  Outcome: Progressing  Goal: Collaborate with me, my family, and caregiver to identify my specific goals  Outcome: Progressing     Problem: Pain  Goal: Takes deep breaths with improved pain control throughout the shift  Outcome: Progressing  Goal: Turns in bed with improved pain control throughout the shift  Outcome: Progressing  Goal: Walks with improved pain control throughout the shift  Outcome: Progressing  Goal: Performs ADL's with improved pain control throughout shift  Outcome: Progressing  Goal: Participates in PT with improved pain control throughout the shift  Outcome: Progressing  Goal: Free from opioid side effects throughout the shift  Outcome: Progressing  Goal: Free from acute confusion related to pain meds throughout the shift  Outcome: Progressing     Problem: Pain - Adult  Goal: Verbalizes/displays adequate comfort level or baseline comfort level  Outcome: Progressing     Problem: Safety - Adult  Goal: Free from fall injury  Outcome: Progressing     Problem: Discharge Planning  Goal: Discharge to home or other facility with appropriate resources  Outcome: Progressing     Problem: Chronic Conditions and Co-morbidities  Goal: Patient's chronic conditions and co-morbidity symptoms are monitored and maintained or improved  Outcome: Progressing     Problem: Skin  Goal: Decreased wound size/increased tissue granulation at next dressing change  Outcome: Progressing  Flowsheets (Taken 4/30/2024 8858)  Decreased wound  size/increased tissue granulation at next dressing change:   Promote sleep for wound healing   Protective dressings over bony prominences  Goal: Participates in plan/prevention/treatment measures  Outcome: Progressing  Flowsheets (Taken 4/30/2024 0820)  Participates in plan/prevention/treatment measures:   Discuss with provider PT/OT consult   Elevate heels   Increase activity/out of bed for meals  Goal: Prevent/manage excess moisture  Outcome: Progressing  Flowsheets (Taken 4/30/2024 0820)  Prevent/manage excess moisture:   Cleanse incontinence/protect with barrier cream   Follow provider orders for dressing changes   Moisturize dry skin   Monitor for/manage infection if present  Goal: Prevent/minimize sheer/friction injuries  Outcome: Progressing  Flowsheets (Taken 4/30/2024 0820)  Prevent/minimize sheer/friction injuries:   Increase activity/out of bed for meals   Turn/reposition every 2 hours/use positioning/transfer devices   Use pull sheet  Goal: Promote/optimize nutrition  Outcome: Progressing  Flowsheets (Taken 4/30/2024 0820)  Promote/optimize nutrition: Discuss with provider if NPO > 2 days  Goal: Promote skin healing  Outcome: Progressing  Flowsheets (Taken 4/30/2024 0820)  Promote skin healing:   Assess skin/pad under line(s)/device(s)   Protective dressings over bony prominences   Turn/reposition every 2 hours/use positioning/transfer devices     Problem: Nutrition  Goal: Less than 5 days NPO/clear liquids  Outcome: Progressing  Goal: Promote healing  Outcome: Progressing   The patient's goals for the shift include      The clinical goals for the shift include pt will remain free of injury

## 2024-05-01 LAB
ALBUMIN SERPL BCP-MCNC: 3.2 G/DL (ref 3.4–5)
ALP SERPL-CCNC: 76 U/L (ref 33–136)
ALT SERPL W P-5'-P-CCNC: 49 U/L (ref 10–52)
ANION GAP SERPL CALC-SCNC: 12 MMOL/L (ref 10–20)
AST SERPL W P-5'-P-CCNC: 51 U/L (ref 9–39)
BASOPHILS # BLD AUTO: 0.04 X10*3/UL (ref 0–0.1)
BASOPHILS NFR BLD AUTO: 0.6 %
BILIRUB SERPL-MCNC: 1.1 MG/DL (ref 0–1.2)
BUN SERPL-MCNC: 14 MG/DL (ref 6–23)
CALCIUM SERPL-MCNC: 8.2 MG/DL (ref 8.6–10.3)
CHLORIDE SERPL-SCNC: 106 MMOL/L (ref 98–107)
CO2 SERPL-SCNC: 25 MMOL/L (ref 21–32)
CREAT SERPL-MCNC: 0.96 MG/DL (ref 0.5–1.3)
EGFRCR SERPLBLD CKD-EPI 2021: 87 ML/MIN/1.73M*2
EOSINOPHIL # BLD AUTO: 0.3 X10*3/UL (ref 0–0.7)
EOSINOPHIL NFR BLD AUTO: 4.8 %
ERYTHROCYTE [DISTWIDTH] IN BLOOD BY AUTOMATED COUNT: 13 % (ref 11.5–14.5)
GLUCOSE SERPL-MCNC: 120 MG/DL (ref 74–99)
HCT VFR BLD AUTO: 35.1 % (ref 41–52)
HGB BLD-MCNC: 11.5 G/DL (ref 13.5–17.5)
IMM GRANULOCYTES # BLD AUTO: 0.03 X10*3/UL (ref 0–0.7)
IMM GRANULOCYTES NFR BLD AUTO: 0.5 % (ref 0–0.9)
LYMPHOCYTES # BLD AUTO: 0.89 X10*3/UL (ref 1.2–4.8)
LYMPHOCYTES NFR BLD AUTO: 14.2 %
MCH RBC QN AUTO: 31 PG (ref 26–34)
MCHC RBC AUTO-ENTMCNC: 32.8 G/DL (ref 32–36)
MCV RBC AUTO: 95 FL (ref 80–100)
MONOCYTES # BLD AUTO: 0.48 X10*3/UL (ref 0.1–1)
MONOCYTES NFR BLD AUTO: 7.6 %
NEUTROPHILS # BLD AUTO: 4.54 X10*3/UL (ref 1.2–7.7)
NEUTROPHILS NFR BLD AUTO: 72.3 %
NRBC BLD-RTO: 0 /100 WBCS (ref 0–0)
PLATELET # BLD AUTO: 309 X10*3/UL (ref 150–450)
POTASSIUM SERPL-SCNC: 3.5 MMOL/L (ref 3.5–5.3)
PROT SERPL-MCNC: 6.1 G/DL (ref 6.4–8.2)
RBC # BLD AUTO: 3.71 X10*6/UL (ref 4.5–5.9)
SODIUM SERPL-SCNC: 139 MMOL/L (ref 136–145)
WBC # BLD AUTO: 6.3 X10*3/UL (ref 4.4–11.3)

## 2024-05-01 PROCEDURE — 1100000001 HC PRIVATE ROOM DAILY

## 2024-05-01 PROCEDURE — 84075 ASSAY ALKALINE PHOSPHATASE: CPT | Performed by: NURSE PRACTITIONER

## 2024-05-01 PROCEDURE — 99232 SBSQ HOSP IP/OBS MODERATE 35: CPT | Performed by: NURSE PRACTITIONER

## 2024-05-01 PROCEDURE — 85025 COMPLETE CBC W/AUTO DIFF WBC: CPT

## 2024-05-01 PROCEDURE — 2500000004 HC RX 250 GENERAL PHARMACY W/ HCPCS (ALT 636 FOR OP/ED): Performed by: NURSE PRACTITIONER

## 2024-05-01 PROCEDURE — 2500000001 HC RX 250 WO HCPCS SELF ADMINISTERED DRUGS (ALT 637 FOR MEDICARE OP): Performed by: SURGERY

## 2024-05-01 PROCEDURE — 2500000001 HC RX 250 WO HCPCS SELF ADMINISTERED DRUGS (ALT 637 FOR MEDICARE OP): Performed by: NURSE PRACTITIONER

## 2024-05-01 PROCEDURE — 2500000004 HC RX 250 GENERAL PHARMACY W/ HCPCS (ALT 636 FOR OP/ED): Performed by: INTERNAL MEDICINE

## 2024-05-01 PROCEDURE — 36415 COLL VENOUS BLD VENIPUNCTURE: CPT

## 2024-05-01 PROCEDURE — 2500000001 HC RX 250 WO HCPCS SELF ADMINISTERED DRUGS (ALT 637 FOR MEDICARE OP): Performed by: PHYSICIAN ASSISTANT

## 2024-05-01 PROCEDURE — C9113 INJ PANTOPRAZOLE SODIUM, VIA: HCPCS | Performed by: SURGERY

## 2024-05-01 PROCEDURE — 2500000004 HC RX 250 GENERAL PHARMACY W/ HCPCS (ALT 636 FOR OP/ED): Performed by: SURGERY

## 2024-05-01 RX ORDER — POTASSIUM CHLORIDE 1.5 G/1.58G
20 POWDER, FOR SOLUTION ORAL ONCE
Status: COMPLETED | OUTPATIENT
Start: 2024-05-01 | End: 2024-05-01

## 2024-05-01 RX ADMIN — ENOXAPARIN SODIUM 150 MG: 300 INJECTION INTRAVENOUS; SUBCUTANEOUS at 21:29

## 2024-05-01 RX ADMIN — FUROSEMIDE 20 MG: 20 TABLET ORAL at 09:17

## 2024-05-01 RX ADMIN — PIPERACILLIN SODIUM AND TAZOBACTAM SODIUM 3.38 G: 3; .375 INJECTION, SOLUTION INTRAVENOUS at 00:34

## 2024-05-01 RX ADMIN — ENOXAPARIN SODIUM 150 MG: 300 INJECTION INTRAVENOUS; SUBCUTANEOUS at 09:17

## 2024-05-01 RX ADMIN — PIPERACILLIN SODIUM AND TAZOBACTAM SODIUM 3.38 G: 3; .375 INJECTION, SOLUTION INTRAVENOUS at 23:49

## 2024-05-01 RX ADMIN — PIPERACILLIN SODIUM AND TAZOBACTAM SODIUM 3.38 G: 3; .375 INJECTION, SOLUTION INTRAVENOUS at 05:38

## 2024-05-01 RX ADMIN — PANTOPRAZOLE SODIUM 40 MG: 40 INJECTION, POWDER, FOR SOLUTION INTRAVENOUS at 05:43

## 2024-05-01 RX ADMIN — PIPERACILLIN SODIUM AND TAZOBACTAM SODIUM 3.38 G: 3; .375 INJECTION, SOLUTION INTRAVENOUS at 12:54

## 2024-05-01 RX ADMIN — PIPERACILLIN SODIUM AND TAZOBACTAM SODIUM 3.38 G: 3; .375 INJECTION, SOLUTION INTRAVENOUS at 18:20

## 2024-05-01 RX ADMIN — HYDRALAZINE HYDROCHLORIDE 25 MG: 25 TABLET ORAL at 21:28

## 2024-05-01 RX ADMIN — DILTIAZEM HYDROCHLORIDE 120 MG: 120 CAPSULE, COATED, EXTENDED RELEASE ORAL at 09:16

## 2024-05-01 RX ADMIN — SODIUM CHLORIDE, SODIUM LACTATE, POTASSIUM CHLORIDE, CALCIUM CHLORIDE AND DEXTROSE MONOHYDRATE 75 ML/HR: 5; 600; 310; 30; 20 INJECTION, SOLUTION INTRAVENOUS at 18:24

## 2024-05-01 RX ADMIN — POTASSIUM CHLORIDE 20 MEQ: 1.5 POWDER, FOR SOLUTION ORAL at 09:16

## 2024-05-01 ASSESSMENT — COGNITIVE AND FUNCTIONAL STATUS - GENERAL
DRESSING REGULAR LOWER BODY CLOTHING: A LITTLE
DAILY ACTIVITIY SCORE: 23
MOBILITY SCORE: 24

## 2024-05-01 ASSESSMENT — ENCOUNTER SYMPTOMS
ABDOMINAL PAIN: 0
FEVER: 0
JOINT SWELLING: 0
HEADACHES: 0
CHILLS: 0
CHEST TIGHTNESS: 0
SHORTNESS OF BREATH: 0
NAUSEA: 0
DIFFICULTY URINATING: 0

## 2024-05-01 ASSESSMENT — PAIN SCALES - GENERAL
PAINLEVEL_OUTOF10: 0 - NO PAIN
PAINLEVEL_OUTOF10: 3

## 2024-05-01 ASSESSMENT — PAIN - FUNCTIONAL ASSESSMENT: PAIN_FUNCTIONAL_ASSESSMENT: 0-10

## 2024-05-01 ASSESSMENT — PAIN DESCRIPTION - DESCRIPTORS: DESCRIPTORS: SORE

## 2024-05-01 NOTE — PROGRESS NOTES
Patient had NG removed and advancement of diet. Patient plans to return home with no needs upon discharge.

## 2024-05-01 NOTE — PROGRESS NOTES
"GENERAL SURGERY PROGRESS NOTE    Rambo Daigle   1960   [id number]     Rambo Daigle is a 64 y.o. male on day 8 of admission presenting with Infected hernioplasty mesh, initial encounter (CMS-[facility]).    Exploration Laparotomy small bowel resection with removal of mesh on 4/25/24, 6 Days Post-Op    Subjective  PT MICHAEL, Passed 2, 4 and 8 hour clamp trials. Patient reports no bloating, is passing gas, and had multiple BM. Patient denies any nausea or emesis, will remove NG and start on CLD    Review of Systems:  Review of Systems   Constitutional:  Negative for chills and fever.   Respiratory:  Negative for chest tightness and shortness of breath.    Cardiovascular:  Negative for chest pain and leg swelling.   Gastrointestinal:  Negative for abdominal pain and nausea.   Genitourinary:  Negative for difficulty urinating.   Musculoskeletal:  Negative for joint swelling.   Neurological:  Negative for headaches.       Objective    Last Recorded Vitals  Blood pressure 157/90, pulse 77, temperature 36.4 °C (97.5 °F), temperature source Temporal, resp. rate 18, height 1.956 m (6' 5\"), weight (!) 153 kg (336 lb 12.8 oz), SpO2 96%.    Intake/Output last 3 Shifts:  I/O last 3 completed shifts:  In: 2760.4 (18.1 mL/kg) [P.O.:150; I.V.:1760.4 (11.5 mL/kg); NG/GT:150; IV Piggyback:700]  Out: 2745 (18 mL/kg) [Urine:1225 (0.2 mL/kg/hr); Emesis/NG output:1520]  Weight: 152.8 kg     Intake/Output Summary (Last 24 hours) at 5/1/2024 0711  Last data filed at 5/1/2024 0552  Gross per 24 hour   Intake 1743.75 ml   Output 1495 ml   Net 248.75 ml       Physical Exam  Vitals reviewed.   Constitutional:       General: He is not in acute distress.  HENT:      Head: Normocephalic and atraumatic.   Eyes:      Extraocular Movements: Extraocular movements intact.      Conjunctiva/sclera: Conjunctivae normal.   Cardiovascular:      Rate and Rhythm: Normal rate and regular rhythm.      Pulses: Normal pulses.   Pulmonary:      Effort: Pulmonary effort is " normal. No respiratory distress.   Abdominal:      Comments: Soft, non distended, non tympanic, non tender, does have minimal serous drainage along midline incision.   Musculoskeletal:         General: Normal range of motion.      Cervical back: Neck supple.   Skin:     General: Skin is warm and dry.   Neurological:      General: No focal deficit present.      Mental Status: He is alert and oriented to person, place, and time.         Relevant Results  Labs:  Results for orders placed or performed during the hospital encounter of 04/23/24 (from the past 24 hour(s))   Comprehensive Metabolic Panel   Result Value Ref Range    Glucose 120 (H) 74 - 99 mg/dL    Sodium 139 136 - 145 mmol/L    Potassium 3.5 3.5 - 5.3 mmol/L    Chloride 106 98 - 107 mmol/L    Bicarbonate 25 21 - 32 mmol/L    Anion Gap 12 10 - 20 mmol/L    Urea Nitrogen 14 6 - 23 mg/dL    Creatinine 0.96 0.50 - 1.30 mg/dL    eGFR 87 >60 mL/min/1.73m*2    Calcium 8.2 (L) 8.6 - 10.3 mg/dL    Albumin 3.2 (L) 3.4 - 5.0 g/dL    Alkaline Phosphatase 76 33 - 136 U/L    Total Protein 6.1 (L) 6.4 - 8.2 g/dL    AST 51 (H) 9 - 39 U/L    Bilirubin, Total 1.1 0.0 - 1.2 mg/dL    ALT 49 10 - 52 U/L   CBC and Auto Differential   Result Value Ref Range    WBC 6.3 4.4 - 11.3 x10*3/uL    nRBC 0.0 0.0 - 0.0 /100 WBCs    RBC 3.71 (L) 4.50 - 5.90 x10*6/uL    Hemoglobin 11.5 (L) 13.5 - 17.5 g/dL    Hematocrit 35.1 (L) 41.0 - 52.0 %    MCV 95 80 - 100 fL    MCH 31.0 26.0 - 34.0 pg    MCHC 32.8 32.0 - 36.0 g/dL    RDW 13.0 11.5 - 14.5 %    Platelets 309 150 - 450 x10*3/uL    Neutrophils % 72.3 40.0 - 80.0 %    Immature Granulocytes %, Automated 0.5 0.0 - 0.9 %    Lymphocytes % 14.2 13.0 - 44.0 %    Monocytes % 7.6 2.0 - 10.0 %    Eosinophils % 4.8 0.0 - 6.0 %    Basophils % 0.6 0.0 - 2.0 %    Neutrophils Absolute 4.54 1.20 - 7.70 x10*3/uL    Immature Granulocytes Absolute, Automated 0.03 0.00 - 0.70 x10*3/uL    Lymphocytes Absolute 0.89 (L) 1.20 - 4.80 x10*3/uL    Monocytes Absolute  0.48 0.10 - 1.00 x10*3/uL    Eosinophils Absolute 0.30 0.00 - 0.70 x10*3/uL    Basophils Absolute 0.04 0.00 - 0.10 x10*3/uL       Images:  XR abdomen 1 view   Final Result   1.  Mild gaseous distention of small bowel loops   2. Nasogastric tube overlies the left upper abdomen with the side   hole possibly in the esophagus. Recommend advancing the nasogastric   tube approximately 8 cm and repeating the x-ray centered on the upper   abdomen/lung bases to better evaluate tube placement.        MACRO:   None        Signed by: Cheryle Jackson 4/27/2024 10:39 AM   Dictation workstation:   [id number]      CT abdomen pelvis w IV contrast   Final Result   Cellulitis around the umbilicus        Along the umbilical tract deeper from the skin, probable 3 x 1.8 x   1.7 cm fluid abscess        The gas bubble I have annotated on axial image 105 and another on   axial 108 are superficial enough they could theoretically have been   pushed into the umbilical tract from outside        On the other hand, a cluster of gas bubbles I have annotated on axial   102 are deep enough to be immediately superficial to the mesh hernia   repair material. These bubbles are more alarming that there may be a   developing sinus tract from the umbilicus into the peritoneal space,   even potentially a burgeoning enterocutaneous fistula. Note one or   two segments of small bowel passed directly deep to and adjacent to   the left lateral margin of the hernia mesh where the deeper gas   bubbles extend. Perhaps the gas bubbles on axial 102 are from a   partially collapsed short segment of small bowel that has passed   ventral to the mesh        Note in the subcutaneous fat to the right of at, and just above the   umbilical cellulitis, there are two subcutaneous foreign bodies, one   approximately 18 mm long on sagittal 97, the other approximately 11   mm long on sagittal 95/96. The curvilinear shape suggests they could   be suture needles, although the  attenuation is fairly low. Note also   the round, much higher density foreign body in the subcutaneous fat   to the left of and well below the umbilicus on axial image 120; this   one resembles a ballistic fragment or BB        No other acute findings        MACRO:   None        Signed by: Rambo Higginbotham 4/23/2024 2:42 PM   Dictation workstation:   [id number]          Assessment and Plan  Principal Problem:    Infected hernioplasty mesh, initial encounter (CMS-[facility])  Active Problems:    Atrial fibrillation (Multi)    Benign essential hypertension    Chronic heart failure with preserved ejection fraction (Multi)    NA (obstructive sleep apnea)    64 y.o. male with history of Afib on Xarelto presenting with umbilical erythema and drainage due to infected prior hernia repair mesh now status post exploratory laparotomy with explantation of mesh and small bowel resection on 4/25/24 with ileus.    Plan:  -Continue pain control and anti-emetics  -DC NG and start on CLD  -Maintenance IVF till adequate PO  -Xarelto held d/t NPO, LVX per IMS  -Continue IV abx: zosyn  -Encourage ambulation and IS use  -SCDs, LVX    Discussed with attending Dr. Melissa Keller DO, PGY-2  General Surgery

## 2024-05-01 NOTE — PROGRESS NOTES
Rambo Daigle is a 64 y.o. male on day 8 of admission presenting with Infected hernioplasty mesh, initial encounter (CMS-[facility]).      Subjective   Rambo Daigle is a 64 y.o. male with Afib, HFpEF, HTN, venous insufficiency, COPD, NA, GERD, lumbar radiculopathy, obesity, umbilical hernia s/p repair presented to ED for abdominal pain. 1 week of swelling and pain around umbilicus. redness and bloody drainage. no f/c. no other recent sx. exercise tolerance fair, able to walk >1 mi and 1-2 flights of stairs. no orthopnea or exertional cp. VSS. umbilical erythema on exam. labs/imaging all wnl. ekg w/ afib, anterior q-waves. ct a/p w/ abscess and potential enterocutaneous fistula surrounding surgical mesh. s/p vanc, zosyn, protonix. admitted to surgery.     RCRI class IV, which carries an 11% risk of major CV complications, though patient able to achieve > 4 METS of activity routinely. Patient's other comorbid conditions are stable. Therefore there are no medical contraindications currently to surgery      4/26/24: Acute events overnight.  Vital stable.  Labs unremarkable.  No leukocytosis, does have slight anemia.  He is s/p Exploration Laparotomy small bowel resection with removal of mesh on 4/25/24. Remains on Zosyn. NPO at time of evaluation, not yet having flatus or Bms post-op, +burping.      4/27/2024: Patient has no passing gas. Surgery team concerned about ileus and placed NGT with suction. Patient has no acute complains of abdominal pain or vomiting. Start lovenox 1 mg/kg for Patient is unable to take oral Xarelto.     4/28/2024: Exploration Laparotomy small bowel resection with removal of mesh on 4/25/24 POD #3. Patient remained on NGT with low suction with 2 L dark green fluid. Patient has no nausea or vomiting but abdomen remained distended with decreased bowel sound.    4/29/2024 No significant events overnight, now PD#4 pt had Bm x 2 and continues to have  Flatus, Pain is controlled, occasional cough, no cp or  palpitations. Anticipate clamping of NGT today, pt encouraged to ambulate.     4.30.2024 pt with continued flatus no N/V or abdominal pain.  excessive NGT output per surgery. He continues to ambulate, taking in Ice chips no cp, sob or orthopnea.     5/1/24 NGT is out, pt w continued flatus, no n/v no cp, orthopnea or NG. Continues on Lovenox for now, will change to Xarelto when taking solid food,       Objective     Last Recorded Vitals  /90 (BP Location: Left arm, Patient Position: Lying)   Pulse 77   Temp 36.4 °C (97.5 °F) (Temporal)   Resp 18   Wt (!) 153 kg (336 lb 12.8 oz)   SpO2 96%   Intake/Output last 3 Shifts:    Intake/Output Summary (Last 24 hours) at 5/1/2024 0829  Last data filed at 5/1/2024 0716  Gross per 24 hour   Intake 1743.75 ml   Output 1495 ml   Net 248.75 ml       Admission Weight  Weight: (!) 154 kg (340 lb) (04/23/24 1058)    Daily Weight  04/23/24 : (!) 153 kg (336 lb 12.8 oz)    Image Results  XR abdomen 1 view  Narrative: Interpreted By:  Cheryle Jackson,   STUDY:  XR ABDOMEN 1 VIEW;  4/27/2024 9:43 am      INDICATION:  Signs/Symptoms:NG tube placement.      COMPARISON:  None.      ACCESSION NUMBER(S):  [id number]      ORDERING CLINICIAN:  COLIN NAJERA      FINDINGS:  Supine views of the abdomen show a nasogastric tube overlying the  left upper abdomen with the side hole possibly in the distal  esophagus. Recommend advancing the nasogastric tube and repeating the  x-ray centered on the abdomen.      There is mild gaseous distention of small-bowel loops. Skin staples  overlie the lower abdomen. There are bilateral hip joint replacements  noted. Degenerative changes are seen in the spine.      Impression: 1.  Mild gaseous distention of small bowel loops  2. Nasogastric tube overlies the left upper abdomen with the side  hole possibly in the esophagus. Recommend advancing the nasogastric  tube approximately 8 cm and repeating the x-ray centered on the upper  abdomen/lung bases  to better evaluate tube placement.      MACRO:  None      Signed by: Cheryle Jackson 4/27/2024 10:39 AM  Dictation workstation:   [id number]      Physical Exam  Constitutional:       General: He is not in acute distress.     Appearance: Normal appearance.   HENT:      Head: Normocephalic.      Mouth/Throat:      Mouth: Mucous membranes are moist.      Pharynx: Oropharynx is clear.   Eyes:      Pupils: Pupils are equal, round, and reactive to light.   Cardiovascular:      Rate and Rhythm: Normal rate. Rhythm irregular.      Heart sounds: Normal heart sounds. No murmur heard.     No gallop.   Pulmonary:      Effort: Pulmonary effort is normal.      Breath sounds: Normal breath sounds.   Abdominal:      General: Bowel sounds are normal. There is distension.      Palpations: Abdomen is soft.      Tenderness: There is no abdominal tenderness. There is no guarding.   Musculoskeletal:         General: No swelling. Normal range of motion.      Cervical back: Neck supple. No rigidity.   Skin:     General: Skin is warm and dry.   Neurological:      General: No focal deficit present.      Mental Status: He is alert.      Cranial Nerves: No cranial nerve deficit.      Sensory: No sensory deficit.   Psychiatric:         Mood and Affect: Mood normal.         Behavior: Behavior normal.         Relevant Results             Scheduled medications  dilTIAZem CD, 120 mg, oral, Daily  enoxaparin, 1 mg/kg, subcutaneous, BID  [Held by provider] furosemide, 20 mg, oral, Daily  pantoprazole, 40 mg, oral, Daily before breakfast   Or  pantoprazole, 40 mg, intravenous, Daily before breakfast  piperacillin-tazobactam, 3.375 g, intravenous, q6h  pneumococcal conjugate, 0.5 mL, intramuscular, During hospitalization  [Held by provider] rivaroxaban, 20 mg, oral, Daily      Continuous medications  dextrose 5 % and lactated Ringer's, 125 mL/hr, Last Rate: 125 mL/hr (04/30/24 5508)      PRN medications  PRN medications: acetaminophen **OR**  acetaminophen **OR** acetaminophen, benzocaine-menthol, hydrALAZINE, HYDROmorphone, ipratropium-albuteroL, ondansetron **OR** ondansetron, phenoL  Results for orders placed or performed during the hospital encounter of 04/23/24 (from the past 96 hour(s))   CBC   Result Value Ref Range    WBC 8.6 4.4 - 11.3 x10*3/uL    nRBC 0.0 0.0 - 0.0 /100 WBCs    RBC 4.33 (L) 4.50 - 5.90 x10*6/uL    Hemoglobin 13.7 13.5 - 17.5 g/dL    Hematocrit 41.4 41.0 - 52.0 %    MCV 96 80 - 100 fL    MCH 31.6 26.0 - 34.0 pg    MCHC 33.1 32.0 - 36.0 g/dL    RDW 13.3 11.5 - 14.5 %    Platelets 326 150 - 450 x10*3/uL   Basic Metabolic Panel   Result Value Ref Range    Glucose 110 (H) 74 - 99 mg/dL    Sodium 139 136 - 145 mmol/L    Potassium 3.8 3.5 - 5.3 mmol/L    Chloride 103 98 - 107 mmol/L    Bicarbonate 26 21 - 32 mmol/L    Anion Gap 14 10 - 20 mmol/L    Urea Nitrogen 12 6 - 23 mg/dL    Creatinine 1.01 0.50 - 1.30 mg/dL    eGFR 82 >60 mL/min/1.73m*2    Calcium 8.8 8.6 - 10.3 mg/dL   Magnesium   Result Value Ref Range    Magnesium 2.13 1.60 - 2.40 mg/dL   Phosphorus   Result Value Ref Range    Phosphorus 2.6 2.5 - 4.9 mg/dL   CBC   Result Value Ref Range    WBC 7.6 4.4 - 11.3 x10*3/uL    nRBC 0.0 0.0 - 0.0 /100 WBCs    RBC 4.12 (L) 4.50 - 5.90 x10*6/uL    Hemoglobin 12.9 (L) 13.5 - 17.5 g/dL    Hematocrit 39.7 (L) 41.0 - 52.0 %    MCV 96 80 - 100 fL    MCH 31.3 26.0 - 34.0 pg    MCHC 32.5 32.0 - 36.0 g/dL    RDW 13.2 11.5 - 14.5 %    Platelets 302 150 - 450 x10*3/uL   Basic metabolic panel   Result Value Ref Range    Glucose 105 (H) 74 - 99 mg/dL    Sodium 139 136 - 145 mmol/L    Potassium 3.6 3.5 - 5.3 mmol/L    Chloride 103 98 - 107 mmol/L    Bicarbonate 28 21 - 32 mmol/L    Anion Gap 12 10 - 20 mmol/L    Urea Nitrogen 14 6 - 23 mg/dL    Creatinine 1.01 0.50 - 1.30 mg/dL    eGFR 82 >60 mL/min/1.73m*2    Calcium 8.4 (L) 8.6 - 10.3 mg/dL   Magnesium   Result Value Ref Range    Magnesium 2.06 1.60 - 2.40 mg/dL   Phosphorus   Result Value Ref  Range    Phosphorus 3.0 2.5 - 4.9 mg/dL   Basic Metabolic Panel   Result Value Ref Range    Glucose 114 (H) 74 - 99 mg/dL    Sodium 139 136 - 145 mmol/L    Potassium 3.7 3.5 - 5.3 mmol/L    Chloride 105 98 - 107 mmol/L    Bicarbonate 26 21 - 32 mmol/L    Anion Gap 12 10 - 20 mmol/L    Urea Nitrogen 15 6 - 23 mg/dL    Creatinine 0.99 0.50 - 1.30 mg/dL    eGFR 84 >60 mL/min/1.73m*2    Calcium 8.7 8.6 - 10.3 mg/dL   CBC and Auto Differential   Result Value Ref Range    WBC 6.3 4.4 - 11.3 x10*3/uL    nRBC 0.0 0.0 - 0.0 /100 WBCs    RBC 4.32 (L) 4.50 - 5.90 x10*6/uL    Hemoglobin 13.9 13.5 - 17.5 g/dL    Hematocrit 40.7 (L) 41.0 - 52.0 %    MCV 94 80 - 100 fL    MCH 32.2 26.0 - 34.0 pg    MCHC 34.2 32.0 - 36.0 g/dL    RDW 12.9 11.5 - 14.5 %    Platelets 327 150 - 450 x10*3/uL    Neutrophils % 67.0 40.0 - 80.0 %    Immature Granulocytes %, Automated 0.8 0.0 - 0.9 %    Lymphocytes % 16.9 13.0 - 44.0 %    Monocytes % 9.0 2.0 - 10.0 %    Eosinophils % 5.4 0.0 - 6.0 %    Basophils % 0.9 0.0 - 2.0 %    Neutrophils Absolute 4.25 1.20 - 7.70 x10*3/uL    Immature Granulocytes Absolute, Automated 0.05 0.00 - 0.70 x10*3/uL    Lymphocytes Absolute 1.07 (L) 1.20 - 4.80 x10*3/uL    Monocytes Absolute 0.57 0.10 - 1.00 x10*3/uL    Eosinophils Absolute 0.34 0.00 - 0.70 x10*3/uL    Basophils Absolute 0.06 0.00 - 0.10 x10*3/uL   Magnesium   Result Value Ref Range    Magnesium 2.00 1.60 - 2.40 mg/dL   Comprehensive Metabolic Panel   Result Value Ref Range    Glucose 120 (H) 74 - 99 mg/dL    Sodium 139 136 - 145 mmol/L    Potassium 3.5 3.5 - 5.3 mmol/L    Chloride 106 98 - 107 mmol/L    Bicarbonate 25 21 - 32 mmol/L    Anion Gap 12 10 - 20 mmol/L    Urea Nitrogen 14 6 - 23 mg/dL    Creatinine 0.96 0.50 - 1.30 mg/dL    eGFR 87 >60 mL/min/1.73m*2    Calcium 8.2 (L) 8.6 - 10.3 mg/dL    Albumin 3.2 (L) 3.4 - 5.0 g/dL    Alkaline Phosphatase 76 33 - 136 U/L    Total Protein 6.1 (L) 6.4 - 8.2 g/dL    AST 51 (H) 9 - 39 U/L    Bilirubin, Total 1.1  0.0 - 1.2 mg/dL    ALT 49 10 - 52 U/L   CBC and Auto Differential   Result Value Ref Range    WBC 6.3 4.4 - 11.3 x10*3/uL    nRBC 0.0 0.0 - 0.0 /100 WBCs    RBC 3.71 (L) 4.50 - 5.90 x10*6/uL    Hemoglobin 11.5 (L) 13.5 - 17.5 g/dL    Hematocrit 35.1 (L) 41.0 - 52.0 %    MCV 95 80 - 100 fL    MCH 31.0 26.0 - 34.0 pg    MCHC 32.8 32.0 - 36.0 g/dL    RDW 13.0 11.5 - 14.5 %    Platelets 309 150 - 450 x10*3/uL    Neutrophils % 72.3 40.0 - 80.0 %    Immature Granulocytes %, Automated 0.5 0.0 - 0.9 %    Lymphocytes % 14.2 13.0 - 44.0 %    Monocytes % 7.6 2.0 - 10.0 %    Eosinophils % 4.8 0.0 - 6.0 %    Basophils % 0.6 0.0 - 2.0 %    Neutrophils Absolute 4.54 1.20 - 7.70 x10*3/uL    Immature Granulocytes Absolute, Automated 0.03 0.00 - 0.70 x10*3/uL    Lymphocytes Absolute 0.89 (L) 1.20 - 4.80 x10*3/uL    Monocytes Absolute 0.48 0.10 - 1.00 x10*3/uL    Eosinophils Absolute 0.30 0.00 - 0.70 x10*3/uL    Basophils Absolute 0.04 0.00 - 0.10 x10*3/uL       Assessment/Plan            Principal Problem:    Infected hernioplasty mesh, initial encounter (CMS-[facility])  Active Problems:    Atrial fibrillation (Multi)    Benign essential hypertension    Chronic heart failure with preserved ejection fraction (Multi)    NA (obstructive sleep apnea)      Infected hernioplasty mesh  S/p removal of mesh and lap sb resection w post op ileus    Pt appears well hydrated on IVF, lytes are stable    NGT is out on CLD, continues on zosyn    Permanent Afib  Rate is controlled  Pt on lovenox for now, was xarelto at home restart when taking more solid PO    Benign essential hypertension  Stable continue to monitor    Chronic diastolic CHF  Appears compensated at this time    NA  Hs CPAP                    Michel Vargas, APRN-CNP

## 2024-05-02 PROCEDURE — 1100000001 HC PRIVATE ROOM DAILY

## 2024-05-02 PROCEDURE — 99232 SBSQ HOSP IP/OBS MODERATE 35: CPT | Performed by: NURSE PRACTITIONER

## 2024-05-02 PROCEDURE — C9113 INJ PANTOPRAZOLE SODIUM, VIA: HCPCS | Performed by: SURGERY

## 2024-05-02 PROCEDURE — 2500000001 HC RX 250 WO HCPCS SELF ADMINISTERED DRUGS (ALT 637 FOR MEDICARE OP): Performed by: SURGERY

## 2024-05-02 PROCEDURE — 2500000004 HC RX 250 GENERAL PHARMACY W/ HCPCS (ALT 636 FOR OP/ED): Performed by: SURGERY

## 2024-05-02 PROCEDURE — 2500000001 HC RX 250 WO HCPCS SELF ADMINISTERED DRUGS (ALT 637 FOR MEDICARE OP): Performed by: NURSE PRACTITIONER

## 2024-05-02 PROCEDURE — 2500000004 HC RX 250 GENERAL PHARMACY W/ HCPCS (ALT 636 FOR OP/ED): Performed by: INTERNAL MEDICINE

## 2024-05-02 RX ORDER — ACETAMINOPHEN 160 MG/5ML
650 SUSPENSION ORAL EVERY 4 HOURS PRN
Status: DISCONTINUED | OUTPATIENT
Start: 2024-05-02 | End: 2024-05-04 | Stop reason: HOSPADM

## 2024-05-02 RX ORDER — ACETAMINOPHEN 650 MG/1
650 SUPPOSITORY RECTAL EVERY 4 HOURS PRN
Status: DISCONTINUED | OUTPATIENT
Start: 2024-05-02 | End: 2024-05-04 | Stop reason: HOSPADM

## 2024-05-02 RX ORDER — ACETAMINOPHEN 325 MG/1
650 TABLET ORAL EVERY 4 HOURS PRN
Status: DISCONTINUED | OUTPATIENT
Start: 2024-05-02 | End: 2024-05-04 | Stop reason: HOSPADM

## 2024-05-02 RX ADMIN — ACETAMINOPHEN 650 MG: 325 TABLET ORAL at 08:47

## 2024-05-02 RX ADMIN — PIPERACILLIN SODIUM AND TAZOBACTAM SODIUM 3.38 G: 3; .375 INJECTION, SOLUTION INTRAVENOUS at 05:57

## 2024-05-02 RX ADMIN — PANTOPRAZOLE SODIUM 40 MG: 40 INJECTION, POWDER, FOR SOLUTION INTRAVENOUS at 05:57

## 2024-05-02 RX ADMIN — PIPERACILLIN SODIUM AND TAZOBACTAM SODIUM 3.38 G: 3; .375 INJECTION, SOLUTION INTRAVENOUS at 12:05

## 2024-05-02 RX ADMIN — ACETAMINOPHEN 650 MG: 325 TABLET ORAL at 01:30

## 2024-05-02 RX ADMIN — PIPERACILLIN SODIUM AND TAZOBACTAM SODIUM 3.38 G: 3; .375 INJECTION, SOLUTION INTRAVENOUS at 18:37

## 2024-05-02 RX ADMIN — FUROSEMIDE 20 MG: 20 TABLET ORAL at 08:39

## 2024-05-02 RX ADMIN — DILTIAZEM HYDROCHLORIDE 120 MG: 120 CAPSULE, COATED, EXTENDED RELEASE ORAL at 08:39

## 2024-05-02 RX ADMIN — Medication 1 EACH: at 00:00

## 2024-05-02 RX ADMIN — ENOXAPARIN SODIUM 150 MG: 300 INJECTION INTRAVENOUS; SUBCUTANEOUS at 20:57

## 2024-05-02 ASSESSMENT — PAIN - FUNCTIONAL ASSESSMENT
PAIN_FUNCTIONAL_ASSESSMENT: 0-10

## 2024-05-02 ASSESSMENT — ENCOUNTER SYMPTOMS
SHORTNESS OF BREATH: 0
NAUSEA: 0
DIFFICULTY URINATING: 0
HEADACHES: 0
ABDOMINAL PAIN: 0
CHEST TIGHTNESS: 0
FEVER: 0
CHILLS: 0
JOINT SWELLING: 0

## 2024-05-02 ASSESSMENT — COGNITIVE AND FUNCTIONAL STATUS - GENERAL
DAILY ACTIVITIY SCORE: 24
MOBILITY SCORE: 24

## 2024-05-02 ASSESSMENT — PAIN SCALES - GENERAL
PAINLEVEL_OUTOF10: 0 - NO PAIN
PAINLEVEL_OUTOF10: 0 - NO PAIN
PAINLEVEL_OUTOF10: 5 - MODERATE PAIN
PAINLEVEL_OUTOF10: 2
PAINLEVEL_OUTOF10: 3
PAINLEVEL_OUTOF10: 3
PAINLEVEL_OUTOF10: 1

## 2024-05-02 ASSESSMENT — PAIN DESCRIPTION - DESCRIPTORS: DESCRIPTORS: SORE

## 2024-05-02 NOTE — PROGRESS NOTES
Rambo Daigle is a 64 y.o. male on day 9 of admission presenting with Infected hernioplasty mesh, initial encounter (CMS-[facility]).      Subjective   Rambo Daigle is a 64 y.o. male with Afib, HFpEF, HTN, venous insufficiency, COPD, NA, GERD, lumbar radiculopathy, obesity, umbilical hernia s/p repair presented to ED for abdominal pain. 1 week of swelling and pain around umbilicus. redness and bloody drainage. no f/c. no other recent sx. exercise tolerance fair, able to walk >1 mi and 1-2 flights of stairs. no orthopnea or exertional cp. VSS. umbilical erythema on exam. labs/imaging all wnl. ekg w/ afib, anterior q-waves. ct a/p w/ abscess and potential enterocutaneous fistula surrounding surgical mesh. s/p vanc, zosyn, protonix. admitted to surgery.     RCRI class IV, which carries an 11% risk of major CV complications, though patient able to achieve > 4 METS of activity routinely. Patient's other comorbid conditions are stable. Therefore there are no medical contraindications currently to surgery      4/26/24: Acute events overnight.  Vital stable.  Labs unremarkable.  No leukocytosis, does have slight anemia.  He is s/p Exploration Laparotomy small bowel resection with removal of mesh on 4/25/24. Remains on Zosyn. NPO at time of evaluation, not yet having flatus or Bms post-op, +burping.      4/27/2024: Patient has no passing gas. Surgery team concerned about ileus and placed NGT with suction. Patient has no acute complains of abdominal pain or vomiting. Start lovenox 1 mg/kg for Patient is unable to take oral Xarelto.     4/28/2024: Exploration Laparotomy small bowel resection with removal of mesh on 4/25/24 POD #3. Patient remained on NGT with low suction with 2 L dark green fluid. Patient has no nausea or vomiting but abdomen remained distended with decreased bowel sound.    4/29/2024 No significant events overnight, now PD#4 pt had Bm x 2 and continues to have  Flatus, Pain is controlled, occasional cough, no cp or  palpitations. Anticipate clamping of NGT today, pt encouraged to ambulate.     4.30.2024 pt with continued flatus no N/V or abdominal pain.  excessive NGT output per surgery. He continues to ambulate, taking in Ice chips no cp, sob or orthopnea.     5/1/24 NGT is out, pt w continued flatus, no n/v no cp, orthopnea or NG. Continues on Lovenox for now, will change to Xarelto when taking solid food,    5.2.24 Pt reports BM this am, tolerating liquid diet hopes to advance today. Slightly lightheaded w BM this am. Surgery reevaluated pt last night due to increased bloody drainage from wound. Wound was cleaned, repacked w surgicel, kerlex and new binder.        Objective     Last Recorded Vitals  /88 (Patient Position: Lying)   Pulse 83   Temp 36.8 °C (98.2 °F) (Temporal)   Resp 17   Wt (!) 153 kg (336 lb 12.8 oz)   SpO2 96%   Intake/Output last 3 Shifts:    Intake/Output Summary (Last 24 hours) at 5/2/2024 0800  Last data filed at 5/2/2024 0748  Gross per 24 hour   Intake 2348.42 ml   Output 1450 ml   Net 898.42 ml       Admission Weight  Weight: (!) 154 kg (340 lb) (04/23/24 1058)    Daily Weight  04/23/24 : (!) 153 kg (336 lb 12.8 oz)    Image Results  XR abdomen 1 view  Narrative: Interpreted By:  Cheryle Jackson,   STUDY:  XR ABDOMEN 1 VIEW;  4/27/2024 9:43 am      INDICATION:  Signs/Symptoms:NG tube placement.      COMPARISON:  None.      ACCESSION NUMBER(S):  [id number]      ORDERING CLINICIAN:  COLIN NAJERA      FINDINGS:  Supine views of the abdomen show a nasogastric tube overlying the  left upper abdomen with the side hole possibly in the distal  esophagus. Recommend advancing the nasogastric tube and repeating the  x-ray centered on the abdomen.      There is mild gaseous distention of small-bowel loops. Skin staples  overlie the lower abdomen. There are bilateral hip joint replacements  noted. Degenerative changes are seen in the spine.      Impression: 1.  Mild gaseous distention of small bowel  loops  2. Nasogastric tube overlies the left upper abdomen with the side  hole possibly in the esophagus. Recommend advancing the nasogastric  tube approximately 8 cm and repeating the x-ray centered on the upper  abdomen/lung bases to better evaluate tube placement.      MACRO:  None      Signed by: Cheryle Jackson 4/27/2024 10:39 AM  Dictation workstation:   [id number]      Physical Exam  Constitutional:       General: He is not in acute distress.     Appearance: Normal appearance.   HENT:      Head: Normocephalic.      Mouth/Throat:      Mouth: Mucous membranes are moist.      Pharynx: Oropharynx is clear.   Eyes:      Pupils: Pupils are equal, round, and reactive to light.   Cardiovascular:      Rate and Rhythm: Normal rate. Rhythm irregular.      Heart sounds: Normal heart sounds. No murmur heard.     No gallop.   Pulmonary:      Effort: Pulmonary effort is normal.      Breath sounds: Normal breath sounds.   Abdominal:      General: Bowel sounds are normal. There is distension.      Palpations: Abdomen is soft.      Tenderness: There is no abdominal tenderness. There is no guarding.   Musculoskeletal:         General: No swelling. Normal range of motion.      Cervical back: Neck supple. No rigidity.   Skin:     General: Skin is warm and dry.   Neurological:      General: No focal deficit present.      Mental Status: He is alert.      Cranial Nerves: No cranial nerve deficit.      Sensory: No sensory deficit.   Psychiatric:         Mood and Affect: Mood normal.         Behavior: Behavior normal.         Relevant Results             Scheduled medications  dilTIAZem CD, 120 mg, oral, Daily  enoxaparin, 1 mg/kg, subcutaneous, BID  furosemide, 20 mg, oral, Daily  pantoprazole, 40 mg, oral, Daily before breakfast   Or  pantoprazole, 40 mg, intravenous, Daily before breakfast  piperacillin-tazobactam, 3.375 g, intravenous, q6h  pneumococcal conjugate, 0.5 mL, intramuscular, During hospitalization  [Held by provider]  rivaroxaban, 20 mg, oral, Daily      Continuous medications       PRN medications  PRN medications: acetaminophen **OR** acetaminophen **OR** acetaminophen, benzocaine-menthol, hydrALAZINE, HYDROmorphone, ipratropium-albuteroL, ondansetron **OR** ondansetron, phenoL, surgicel hemostat 4 x 8  Results for orders placed or performed during the hospital encounter of 04/23/24 (from the past 96 hour(s))   CBC   Result Value Ref Range    WBC 7.6 4.4 - 11.3 x10*3/uL    nRBC 0.0 0.0 - 0.0 /100 WBCs    RBC 4.12 (L) 4.50 - 5.90 x10*6/uL    Hemoglobin 12.9 (L) 13.5 - 17.5 g/dL    Hematocrit 39.7 (L) 41.0 - 52.0 %    MCV 96 80 - 100 fL    MCH 31.3 26.0 - 34.0 pg    MCHC 32.5 32.0 - 36.0 g/dL    RDW 13.2 11.5 - 14.5 %    Platelets 302 150 - 450 x10*3/uL   Basic metabolic panel   Result Value Ref Range    Glucose 105 (H) 74 - 99 mg/dL    Sodium 139 136 - 145 mmol/L    Potassium 3.6 3.5 - 5.3 mmol/L    Chloride 103 98 - 107 mmol/L    Bicarbonate 28 21 - 32 mmol/L    Anion Gap 12 10 - 20 mmol/L    Urea Nitrogen 14 6 - 23 mg/dL    Creatinine 1.01 0.50 - 1.30 mg/dL    eGFR 82 >60 mL/min/1.73m*2    Calcium 8.4 (L) 8.6 - 10.3 mg/dL   Magnesium   Result Value Ref Range    Magnesium 2.06 1.60 - 2.40 mg/dL   Phosphorus   Result Value Ref Range    Phosphorus 3.0 2.5 - 4.9 mg/dL   Basic Metabolic Panel   Result Value Ref Range    Glucose 114 (H) 74 - 99 mg/dL    Sodium 139 136 - 145 mmol/L    Potassium 3.7 3.5 - 5.3 mmol/L    Chloride 105 98 - 107 mmol/L    Bicarbonate 26 21 - 32 mmol/L    Anion Gap 12 10 - 20 mmol/L    Urea Nitrogen 15 6 - 23 mg/dL    Creatinine 0.99 0.50 - 1.30 mg/dL    eGFR 84 >60 mL/min/1.73m*2    Calcium 8.7 8.6 - 10.3 mg/dL   CBC and Auto Differential   Result Value Ref Range    WBC 6.3 4.4 - 11.3 x10*3/uL    nRBC 0.0 0.0 - 0.0 /100 WBCs    RBC 4.32 (L) 4.50 - 5.90 x10*6/uL    Hemoglobin 13.9 13.5 - 17.5 g/dL    Hematocrit 40.7 (L) 41.0 - 52.0 %    MCV 94 80 - 100 fL    MCH 32.2 26.0 - 34.0 pg    MCHC 34.2 32.0 - 36.0  g/dL    RDW 12.9 11.5 - 14.5 %    Platelets 327 150 - 450 x10*3/uL    Neutrophils % 67.0 40.0 - 80.0 %    Immature Granulocytes %, Automated 0.8 0.0 - 0.9 %    Lymphocytes % 16.9 13.0 - 44.0 %    Monocytes % 9.0 2.0 - 10.0 %    Eosinophils % 5.4 0.0 - 6.0 %    Basophils % 0.9 0.0 - 2.0 %    Neutrophils Absolute 4.25 1.20 - 7.70 x10*3/uL    Immature Granulocytes Absolute, Automated 0.05 0.00 - 0.70 x10*3/uL    Lymphocytes Absolute 1.07 (L) 1.20 - 4.80 x10*3/uL    Monocytes Absolute 0.57 0.10 - 1.00 x10*3/uL    Eosinophils Absolute 0.34 0.00 - 0.70 x10*3/uL    Basophils Absolute 0.06 0.00 - 0.10 x10*3/uL   Magnesium   Result Value Ref Range    Magnesium 2.00 1.60 - 2.40 mg/dL   Comprehensive Metabolic Panel   Result Value Ref Range    Glucose 120 (H) 74 - 99 mg/dL    Sodium 139 136 - 145 mmol/L    Potassium 3.5 3.5 - 5.3 mmol/L    Chloride 106 98 - 107 mmol/L    Bicarbonate 25 21 - 32 mmol/L    Anion Gap 12 10 - 20 mmol/L    Urea Nitrogen 14 6 - 23 mg/dL    Creatinine 0.96 0.50 - 1.30 mg/dL    eGFR 87 >60 mL/min/1.73m*2    Calcium 8.2 (L) 8.6 - 10.3 mg/dL    Albumin 3.2 (L) 3.4 - 5.0 g/dL    Alkaline Phosphatase 76 33 - 136 U/L    Total Protein 6.1 (L) 6.4 - 8.2 g/dL    AST 51 (H) 9 - 39 U/L    Bilirubin, Total 1.1 0.0 - 1.2 mg/dL    ALT 49 10 - 52 U/L   CBC and Auto Differential   Result Value Ref Range    WBC 6.3 4.4 - 11.3 x10*3/uL    nRBC 0.0 0.0 - 0.0 /100 WBCs    RBC 3.71 (L) 4.50 - 5.90 x10*6/uL    Hemoglobin 11.5 (L) 13.5 - 17.5 g/dL    Hematocrit 35.1 (L) 41.0 - 52.0 %    MCV 95 80 - 100 fL    MCH 31.0 26.0 - 34.0 pg    MCHC 32.8 32.0 - 36.0 g/dL    RDW 13.0 11.5 - 14.5 %    Platelets 309 150 - 450 x10*3/uL    Neutrophils % 72.3 40.0 - 80.0 %    Immature Granulocytes %, Automated 0.5 0.0 - 0.9 %    Lymphocytes % 14.2 13.0 - 44.0 %    Monocytes % 7.6 2.0 - 10.0 %    Eosinophils % 4.8 0.0 - 6.0 %    Basophils % 0.6 0.0 - 2.0 %    Neutrophils Absolute 4.54 1.20 - 7.70 x10*3/uL    Immature Granulocytes Absolute,  Automated 0.03 0.00 - 0.70 x10*3/uL    Lymphocytes Absolute 0.89 (L) 1.20 - 4.80 x10*3/uL    Monocytes Absolute 0.48 0.10 - 1.00 x10*3/uL    Eosinophils Absolute 0.30 0.00 - 0.70 x10*3/uL    Basophils Absolute 0.04 0.00 - 0.10 x10*3/uL       Assessment/Plan            Principal Problem:    Infected hernioplasty mesh, initial encounter (CMS-[facility])  Active Problems:    Atrial fibrillation (Multi)    Benign essential hypertension    Chronic heart failure with preserved ejection fraction (Multi)    NA (obstructive sleep apnea)      Infected hernioplasty mesh  S/p removal of mesh and lap sb resection w post op ileus    Pt appears well hydrated on IVF, lytes are stable    NGT is out on CLD, continues on zosyn    Permanent Afib  Rate is controlled  Pt on lovenox for now, was xarelto at home restart when taking more solid PO    Benign essential hypertension  Stable continue to monitor    Chronic diastolic CHF  Appears compensated at this time  Pt taking PO well, will stop IVF    NA  Hs CPAP                    Michel Vargas, APRN-CNP

## 2024-05-02 NOTE — SIGNIFICANT EVENT
Called pt with bleeding from incision.  Pictures sent from nurse from bedside. Surgicel was ordered to bedside.  Wound cleaned of clots, surgicel packed into the wound.  Kerlix placed over top and pressure dressing applied.  New abd binder ordered as pts had become saturated with blood.

## 2024-05-02 NOTE — PROGRESS NOTES
"Nutrition Initial Assessment:   Nutrition Assessment    Reason for Assessment: Length of stay    Medical history per chart:   64 y.o. male with Afib, HFpEF, HTN, venous insufficiency, COPD, NA, GERD, lumbar radiculopathy, obesity, umbilical hernia s/p repair presented to ED for abdominal pain.     5/2:  Pt awake, and wife at bedside.  Pt is s/p Ex Lap small bowel resection with removal of mesh on 4/25/24.  Diet advanced to Fulls today, and pt reports tolerating.  Pt requesting Regular diet.       Nutrition History:  Food and Nutrient History: Good PO intake at home  Energy Intake: Good > 75 %    Current Diet: Adult diet Full Liquid       Nutrition Related Findings:   Oral Symptoms: none       BM: Last BM Date: 05/01/24  Wound Type: surgical (nursing/wound notes provide further details)    Food allergies: NKFA. has No Known Allergies.  Meds/Labs reviewed.  dilTIAZem CD, 120 mg, oral, Daily  enoxaparin, 1 mg/kg, subcutaneous, BID  furosemide, 20 mg, oral, Daily  pantoprazole, 40 mg, oral, Daily before breakfast   Or  pantoprazole, 40 mg, intravenous, Daily before breakfast  piperacillin-tazobactam, 3.375 g, intravenous, q6h  pneumococcal conjugate, 0.5 mL, intramuscular, During hospitalization  [Held by provider] rivaroxaban, 20 mg, oral, Daily             Nutrition Significant Labs:    Results from last 7 days   Lab Units 05/01/24  0502 04/30/24  0652 04/29/24  0510 04/28/24  0647 04/27/24  0442   GLUCOSE mg/dL 120* 114* 105* 110* 108*   SODIUM mmol/L 139 139 139 139 135*   POTASSIUM mmol/L 3.5 3.7 3.6 3.8 5.1   CHLORIDE mmol/L 106 105 103 103 102   CO2 mmol/L 25 26 28 26 21   BUN mg/dL 14 15 14 12 9   CREATININE mg/dL 0.96 0.99 1.01 1.01 1.06   EGFR mL/min/1.73m*2 87 84 82 82 77   CALCIUM mg/dL 8.2* 8.7 8.4* 8.8 9.3   PHOSPHORUS mg/dL  --   --  3.0 2.6 2.4*   MAGNESIUM mg/dL  --  2.00 2.06 2.13 2.17     No results found for: \"HGBA1C\"        Anthropometrics:  Height: 195.6 cm (6' 5\")   Weight: (!) 153 kg (336 lb " 12.8 oz)   BMI (Calculated): 39.93  IBW/kg (Dietitian Calculated): 94.5 kg          Weight History:   Wt Readings from Last 10 Encounters:   04/23/24 (!) 153 kg (336 lb 12.8 oz)   03/26/24 (!) 156 kg (343 lb 3.2 oz)   01/24/24 (!) 153 kg (338 lb)   10/24/23 (!) 151 kg (333 lb)   10/19/23 (!) 152 kg (334 lb)   10/10/23 (!) 152 kg (334 lb)   09/20/23 (!) 152 kg (334 lb)   09/12/23 (!) 152 kg (334 lb)   06/21/23 147 kg (324 lb)   05/01/23 145 kg (320 lb)        Weight Change %:  Weight History / % Weight Change: Weight has been stable.  Patient desires weight loss  Significant Weight Loss: No          Nutrition Focused Physical Exam Findings:    Subcutaneous Fat Loss:   Orbital Fat Pads: Well nourished (slightly bulging fat pads)  Buccal Fat Pads: Well nourished (full, rounded cheeks)  Triceps: Well nourished (ample fat tissue)  Muscle Wasting:  Temporalis: Well nourished (well-defined muscle)  Pectoralis (Clavicular Region): Well nourished (clavicle not visible)  Deltoid/Trapezius: Well nourished (rounded appearance at arm, shoulder, neck)  Edema:     Physical Findings:  Skin: Positive (ABD wound)    Estimated Needs:   Total Energy Estimated Needs (kCal): 2363 kCal  Method for Estimating Needs: 25 kcal/kg IBW  Total Protein Estimated Needs (g): 113 g  Method for Estimating Needs: 1.2 gm/kg IBW     Method for Estimating Needs: 1ml/kcal        Nutrition Diagnosis   Nutrition Diagnosis:  Malnutrition Diagnosis  Patient has Malnutrition Diagnosis: No    Nutrition Diagnosis  Patient has Nutrition Diagnosis: Yes  Diagnosis Status (1): New  Nutrition Diagnosis 1: Predicted inadequate energy intake  Related to (1): s/p ex lap removal of mesh  As Evidenced by (1): NPO/Clear/Full liquid diet >7 days       Nutrition Interventions/Recommendations   Nutrition Interventions and Recommendations:        Nutrition Prescription:  Individualized Nutrition Prescription Provided for : Diet advancement        Nutrition Interventions:    Food and/or Nutrient Delivery Interventions  Interventions: Medical food supplement  Medical Food Supplement: Commercial beverage  Goal: Ensure plus high protein BID until diet advances  Additional Interventions: Regular diet when able per surgery    Coordination of Nutrition Care by a Nutrition Professional  Collaboration and Referral of Nutrition Care: Collaboration by nutrition professional with other providers  Goal: Surgery    Nutrition Education:   Education Documentation  No documentation found.      Nutrition Counseling  Counseling Theoretical Approach: Other (Comment)  Goal: Reviewed diet       Nutrition Monitoring and Evaluation   Monitoring/Evaluation:   Food/Nutrient Related History Monitoring  Monitoring and Evaluation Plan: Energy intake  Energy Intake: Estimated energy intake  Criteria: PO itake >75%, diet advancement    Body Composition/Growth/Weight History  Monitoring and Evaluation Plan: Weight  Weight: Measured weight  Criteria: Stable weight    Biochemical Data, Medical Tests and Procedures  Monitoring and Evaluation Plan: Electrolyte/renal panel, Glucose/endocrine profile  Electrolyte and Renal Panel: BUN, Creatinine, Magnesium, Phosphorus, Potassium, Sodium  Criteria: WNL  Glucose/Endocrine Profile: Glucose, casual  Criteria: WNL    Nutrition Focused Physical Findings  Monitoring and Evaluation Plan: Skin  Skin: Impaired wound healing  Criteria: Promote healing            Time Spent/Follow-up Reminder:   Follow Up  Time Spent (min): 45 minutes  Last Date of Nutrition Visit: 05/02/24  Nutrition Follow-Up Needed?: Dietitian to reassess per policy  Follow up Comment: 5/7-5/8

## 2024-05-02 NOTE — PROGRESS NOTES
"GENERAL SURGERY PROGRESS NOTE    Rambo Daigle   1960   [id number]     Rambo Daigle is a 64 y.o. male on day 9 of admission presenting with Infected hernioplasty mesh, initial encounter (CMS-[facility]).    Exploration Laparotomy small bowel resection with removal of mesh on 4/25/24, 7 Days Post-Op    Subjective  PT MICHAEL, tolerating fld, advanced to soft. Had episode of bleeding from midline incision hemostatic with surgicel. Patient denies F/C/N/V/D/CP/SOB    Review of Systems:  Review of Systems   Constitutional:  Negative for chills and fever.   Respiratory:  Negative for chest tightness and shortness of breath.    Cardiovascular:  Negative for chest pain and leg swelling.   Gastrointestinal:  Negative for abdominal pain and nausea.   Genitourinary:  Negative for difficulty urinating.   Musculoskeletal:  Negative for joint swelling.   Neurological:  Negative for headaches.       Objective    Last Recorded Vitals  Blood pressure 113/61, pulse 84, temperature 36.9 °C (98.4 °F), temperature source Temporal, resp. rate 17, height 1.956 m (6' 5\"), weight (!) 153 kg (336 lb 12.8 oz), SpO2 97%.    Intake/Output last 3 Shifts:  I/O last 3 completed shifts:  In: 1898.4 (12.4 mL/kg) [P.O.:1038; I.V.:560.4 (3.7 mL/kg); IV Piggyback:300]  Out: 1845 (12.1 mL/kg) [Urine:1775 (0.3 mL/kg/hr); Emesis/NG output:70]  Weight: 152.8 kg     Intake/Output Summary (Last 24 hours) at 5/2/2024 1447  Last data filed at 5/2/2024 1442  Gross per 24 hour   Intake 1760.42 ml   Output 1600 ml   Net 160.42 ml       Physical Exam  Vitals reviewed.   Constitutional:       General: He is not in acute distress.  HENT:      Head: Normocephalic and atraumatic.   Eyes:      Extraocular Movements: Extraocular movements intact.      Conjunctiva/sclera: Conjunctivae normal.   Cardiovascular:      Rate and Rhythm: Normal rate and regular rhythm.      Pulses: Normal pulses.   Pulmonary:      Effort: Pulmonary effort is normal. No respiratory distress.   Abdominal: "      Comments: Soft, non distended, non tympanic, non tender, superficial clot and surgicel on inferior portion of midline incision, wound is hemostatic   Musculoskeletal:         General: Normal range of motion.      Cervical back: Neck supple.   Skin:     General: Skin is warm and dry.   Neurological:      General: No focal deficit present.      Mental Status: He is alert and oriented to person, place, and time.         Relevant Results  Labs:  No results found for this or any previous visit (from the past 24 hour(s)).      Images:  XR abdomen 1 view   Final Result   1.  Mild gaseous distention of small bowel loops   2. Nasogastric tube overlies the left upper abdomen with the side   hole possibly in the esophagus. Recommend advancing the nasogastric   tube approximately 8 cm and repeating the x-ray centered on the upper   abdomen/lung bases to better evaluate tube placement.        MACRO:   None        Signed by: Cheryle Jackson 4/27/2024 10:39 AM   Dictation workstation:   [id number]      CT abdomen pelvis w IV contrast   Final Result   Cellulitis around the umbilicus        Along the umbilical tract deeper from the skin, probable 3 x 1.8 x   1.7 cm fluid abscess        The gas bubble I have annotated on axial image 105 and another on   axial 108 are superficial enough they could theoretically have been   pushed into the umbilical tract from outside        On the other hand, a cluster of gas bubbles I have annotated on axial   102 are deep enough to be immediately superficial to the mesh hernia   repair material. These bubbles are more alarming that there may be a   developing sinus tract from the umbilicus into the peritoneal space,   even potentially a burgeoning enterocutaneous fistula. Note one or   two segments of small bowel passed directly deep to and adjacent to   the left lateral margin of the hernia mesh where the deeper gas   bubbles extend. Perhaps the gas bubbles on axial 102 are from a   partially  collapsed short segment of small bowel that has passed   ventral to the mesh        Note in the subcutaneous fat to the right of at, and just above the   umbilical cellulitis, there are two subcutaneous foreign bodies, one   approximately 18 mm long on sagittal 97, the other approximately 11   mm long on sagittal 95/96. The curvilinear shape suggests they could   be suture needles, although the attenuation is fairly low. Note also   the round, much higher density foreign body in the subcutaneous fat   to the left of and well below the umbilicus on axial image 120; this   one resembles a ballistic fragment or BB        No other acute findings        MACRO:   None        Signed by: Rambo Higginbotham 4/23/2024 2:42 PM   Dictation workstation:   [id number]          Assessment and Plan  Principal Problem:    Infected hernioplasty mesh, initial encounter (CMS-[facility])  Active Problems:    Atrial fibrillation (Multi)    Benign essential hypertension    Chronic heart failure with preserved ejection fraction (Multi)    NA (obstructive sleep apnea)    64 y.o. male with history of Afib on Xarelto presenting with umbilical erythema and drainage due to infected prior hernia repair mesh now status post exploratory laparotomy with explantation of mesh and small bowel resection on 4/25/24 with ileus.    Plan:  -Continue pain control and anti-emetics  -On low fiber diet  -on LVX will transition to xarelto once wound stable  -DC abx finished zosyn day 7  -Encourage ambulation and IS use  -SCDs, LVX  - AM CBC and wound check, possible DC tomorrow morning    Discussed with attending Dr. Melissa Keller DO, PGY-2  General Surgery

## 2024-05-02 NOTE — NURSING NOTE
Received handoff report from previous RN. Patient in bed with call light within reach. Denies needs at this time. Agree with previous RN assessment unless otherwise charted.

## 2024-05-03 LAB
ANION GAP SERPL CALC-SCNC: 10 MMOL/L (ref 10–20)
BASOPHILS # BLD AUTO: 0.07 X10*3/UL (ref 0–0.1)
BASOPHILS NFR BLD AUTO: 0.9 %
BUN SERPL-MCNC: 13 MG/DL (ref 6–23)
CALCIUM SERPL-MCNC: 8.1 MG/DL (ref 8.6–10.3)
CHLORIDE SERPL-SCNC: 102 MMOL/L (ref 98–107)
CO2 SERPL-SCNC: 26 MMOL/L (ref 21–32)
CREAT SERPL-MCNC: 0.93 MG/DL (ref 0.5–1.3)
EGFRCR SERPLBLD CKD-EPI 2021: >90 ML/MIN/1.73M*2
EOSINOPHIL # BLD AUTO: 0.39 X10*3/UL (ref 0–0.7)
EOSINOPHIL NFR BLD AUTO: 5.2 %
ERYTHROCYTE [DISTWIDTH] IN BLOOD BY AUTOMATED COUNT: 13.2 % (ref 11.5–14.5)
GLUCOSE SERPL-MCNC: 105 MG/DL (ref 74–99)
HCT VFR BLD AUTO: 24.3 % (ref 41–52)
HCT VFR BLD AUTO: 25.3 % (ref 41–52)
HGB BLD-MCNC: 8 G/DL (ref 13.5–17.5)
HGB BLD-MCNC: 8.7 G/DL (ref 13.5–17.5)
IMM GRANULOCYTES # BLD AUTO: 0.24 X10*3/UL (ref 0–0.7)
IMM GRANULOCYTES NFR BLD AUTO: 3.2 % (ref 0–0.9)
LABORATORY COMMENT REPORT: NORMAL
LYMPHOCYTES # BLD AUTO: 1.67 X10*3/UL (ref 1.2–4.8)
LYMPHOCYTES NFR BLD AUTO: 22.4 %
MAGNESIUM SERPL-MCNC: 1.81 MG/DL (ref 1.6–2.4)
MCH RBC QN AUTO: 31.1 PG (ref 26–34)
MCHC RBC AUTO-ENTMCNC: 32.9 G/DL (ref 32–36)
MCV RBC AUTO: 95 FL (ref 80–100)
MONOCYTES # BLD AUTO: 0.63 X10*3/UL (ref 0.1–1)
MONOCYTES NFR BLD AUTO: 8.4 %
NEUTROPHILS # BLD AUTO: 4.47 X10*3/UL (ref 1.2–7.7)
NEUTROPHILS NFR BLD AUTO: 59.9 %
NRBC BLD-RTO: 0 /100 WBCS (ref 0–0)
PATH REPORT.FINAL DX SPEC: NORMAL
PATH REPORT.GROSS SPEC: NORMAL
PATH REPORT.RELEVANT HX SPEC: NORMAL
PATH REPORT.TOTAL CANCER: NORMAL
PLATELET # BLD AUTO: 290 X10*3/UL (ref 150–450)
POTASSIUM SERPL-SCNC: 3.6 MMOL/L (ref 3.5–5.3)
RBC # BLD AUTO: 2.57 X10*6/UL (ref 4.5–5.9)
SODIUM SERPL-SCNC: 134 MMOL/L (ref 136–145)
WBC # BLD AUTO: 7.5 X10*3/UL (ref 4.4–11.3)

## 2024-05-03 PROCEDURE — 2500000001 HC RX 250 WO HCPCS SELF ADMINISTERED DRUGS (ALT 637 FOR MEDICARE OP): Performed by: SURGERY

## 2024-05-03 PROCEDURE — 99232 SBSQ HOSP IP/OBS MODERATE 35: CPT | Performed by: NURSE PRACTITIONER

## 2024-05-03 PROCEDURE — 85014 HEMATOCRIT: CPT

## 2024-05-03 PROCEDURE — 1100000001 HC PRIVATE ROOM DAILY

## 2024-05-03 PROCEDURE — 85025 COMPLETE CBC W/AUTO DIFF WBC: CPT | Performed by: NURSE PRACTITIONER

## 2024-05-03 PROCEDURE — 2500000004 HC RX 250 GENERAL PHARMACY W/ HCPCS (ALT 636 FOR OP/ED): Performed by: INTERNAL MEDICINE

## 2024-05-03 PROCEDURE — 2500000001 HC RX 250 WO HCPCS SELF ADMINISTERED DRUGS (ALT 637 FOR MEDICARE OP): Performed by: NURSE PRACTITIONER

## 2024-05-03 PROCEDURE — 36415 COLL VENOUS BLD VENIPUNCTURE: CPT

## 2024-05-03 PROCEDURE — 36415 COLL VENOUS BLD VENIPUNCTURE: CPT | Performed by: NURSE PRACTITIONER

## 2024-05-03 PROCEDURE — 83735 ASSAY OF MAGNESIUM: CPT | Performed by: NURSE PRACTITIONER

## 2024-05-03 PROCEDURE — 2500000004 HC RX 250 GENERAL PHARMACY W/ HCPCS (ALT 636 FOR OP/ED): Performed by: SURGERY

## 2024-05-03 PROCEDURE — 80048 BASIC METABOLIC PNL TOTAL CA: CPT | Performed by: NURSE PRACTITIONER

## 2024-05-03 PROCEDURE — 2500000004 HC RX 250 GENERAL PHARMACY W/ HCPCS (ALT 636 FOR OP/ED): Performed by: NURSE PRACTITIONER

## 2024-05-03 RX ADMIN — PIPERACILLIN SODIUM AND TAZOBACTAM SODIUM 3.38 G: 3; .375 INJECTION, SOLUTION INTRAVENOUS at 05:18

## 2024-05-03 RX ADMIN — PANTOPRAZOLE SODIUM 40 MG: 40 TABLET, DELAYED RELEASE ORAL at 05:18

## 2024-05-03 RX ADMIN — DILTIAZEM HYDROCHLORIDE 120 MG: 120 CAPSULE, COATED, EXTENDED RELEASE ORAL at 08:07

## 2024-05-03 RX ADMIN — ENOXAPARIN SODIUM 150 MG: 300 INJECTION INTRAVENOUS; SUBCUTANEOUS at 20:24

## 2024-05-03 RX ADMIN — PIPERACILLIN SODIUM AND TAZOBACTAM SODIUM 3.38 G: 3; .375 INJECTION, SOLUTION INTRAVENOUS at 00:10

## 2024-05-03 RX ADMIN — PIPERACILLIN SODIUM AND TAZOBACTAM SODIUM 3.38 G: 3; .375 INJECTION, SOLUTION INTRAVENOUS at 17:39

## 2024-05-03 RX ADMIN — FUROSEMIDE 20 MG: 20 TABLET ORAL at 08:07

## 2024-05-03 RX ADMIN — PIPERACILLIN SODIUM AND TAZOBACTAM SODIUM 3.38 G: 3; .375 INJECTION, SOLUTION INTRAVENOUS at 23:54

## 2024-05-03 RX ADMIN — PIPERACILLIN SODIUM AND TAZOBACTAM SODIUM 3.38 G: 3; .375 INJECTION, SOLUTION INTRAVENOUS at 11:49

## 2024-05-03 ASSESSMENT — ENCOUNTER SYMPTOMS
ABDOMINAL PAIN: 0
SHORTNESS OF BREATH: 0
HEADACHES: 0
DIFFICULTY URINATING: 0
JOINT SWELLING: 0
NAUSEA: 0
FEVER: 0
CHEST TIGHTNESS: 0
CHILLS: 0

## 2024-05-03 ASSESSMENT — COGNITIVE AND FUNCTIONAL STATUS - GENERAL
MOBILITY SCORE: 24
DAILY ACTIVITIY SCORE: 24

## 2024-05-03 ASSESSMENT — PAIN - FUNCTIONAL ASSESSMENT: PAIN_FUNCTIONAL_ASSESSMENT: 0-10

## 2024-05-03 ASSESSMENT — PAIN SCALES - GENERAL: PAINLEVEL_OUTOF10: 0 - NO PAIN

## 2024-05-03 NOTE — CARE PLAN
Problem: Pain  Goal: My pain/discomfort is manageable  Outcome: Progressing   The patient's goals for the shift include  Pt will remain safe    The clinical goals for the shift include pt will have decrease bleeding from surgical incision

## 2024-05-03 NOTE — PROGRESS NOTES
"GENERAL SURGERY PROGRESS NOTE    Rambo Daigle   1960   [id number]     Rambo Daigle is a 64 y.o. male on day 10 of admission presenting with Infected hernioplasty mesh, initial encounter (CMS-[facility]).    Exploration Laparotomy small bowel resection with removal of mesh on 4/25/24, 8 Days Post-Op    Subjective  PT MICHEAL, tolerating regular diet. Midline incision hemostatic with surgicel. Patient denies F/C/N/V/D/CP/SOB    Review of Systems:  Review of Systems   Constitutional:  Negative for chills and fever.   Respiratory:  Negative for chest tightness and shortness of breath.    Cardiovascular:  Negative for chest pain and leg swelling.   Gastrointestinal:  Negative for abdominal pain and nausea.   Genitourinary:  Negative for difficulty urinating.   Musculoskeletal:  Negative for joint swelling.   Neurological:  Negative for headaches.       Objective    Last Recorded Vitals  Blood pressure 148/82, pulse 95, temperature 36.5 °C (97.7 °F), temperature source Temporal, resp. rate 18, height 1.956 m (6' 5\"), weight (!) 153 kg (336 lb 12.8 oz), SpO2 97%.    Intake/Output last 3 Shifts:  I/O last 3 completed shifts:  In: 2400.4 (15.7 mL/kg) [P.O.:1540; I.V.:560.4 (3.7 mL/kg); IV Piggyback:300]  Out: 1300 (8.5 mL/kg) [Urine:1300 (0.2 mL/kg/hr)]  Weight: 152.8 kg     Intake/Output Summary (Last 24 hours) at 5/3/2024 1501  Last data filed at 5/3/2024 1338  Gross per 24 hour   Intake 1070 ml   Output 250 ml   Net 820 ml       Physical Exam  Vitals reviewed.   Constitutional:       General: He is not in acute distress.  HENT:      Head: Normocephalic and atraumatic.   Eyes:      Extraocular Movements: Extraocular movements intact.      Conjunctiva/sclera: Conjunctivae normal.   Cardiovascular:      Rate and Rhythm: Normal rate and regular rhythm.      Pulses: Normal pulses.   Pulmonary:      Effort: Pulmonary effort is normal. No respiratory distress.   Abdominal:      Comments: Soft, non distended, non tympanic, non tender, " superficial clot and surgicel on inferior portion of midline incision, wound is hemostatic   Musculoskeletal:         General: Normal range of motion.      Cervical back: Neck supple.   Skin:     General: Skin is warm and dry.   Neurological:      General: No focal deficit present.      Mental Status: He is alert and oriented to person, place, and time.         Relevant Results  Labs:  Results for orders placed or performed during the hospital encounter of 04/23/24 (from the past 24 hour(s))   Basic Metabolic Panel   Result Value Ref Range    Glucose 105 (H) 74 - 99 mg/dL    Sodium 134 (L) 136 - 145 mmol/L    Potassium 3.6 3.5 - 5.3 mmol/L    Chloride 102 98 - 107 mmol/L    Bicarbonate 26 21 - 32 mmol/L    Anion Gap 10 10 - 20 mmol/L    Urea Nitrogen 13 6 - 23 mg/dL    Creatinine 0.93 0.50 - 1.30 mg/dL    eGFR >90 >60 mL/min/1.73m*2    Calcium 8.1 (L) 8.6 - 10.3 mg/dL   CBC and Auto Differential   Result Value Ref Range    WBC 7.5 4.4 - 11.3 x10*3/uL    nRBC 0.0 0.0 - 0.0 /100 WBCs    RBC 2.57 (L) 4.50 - 5.90 x10*6/uL    Hemoglobin 8.0 (L) 13.5 - 17.5 g/dL    Hematocrit 24.3 (L) 41.0 - 52.0 %    MCV 95 80 - 100 fL    MCH 31.1 26.0 - 34.0 pg    MCHC 32.9 32.0 - 36.0 g/dL    RDW 13.2 11.5 - 14.5 %    Platelets 290 150 - 450 x10*3/uL    Neutrophils % 59.9 40.0 - 80.0 %    Immature Granulocytes %, Automated 3.2 (H) 0.0 - 0.9 %    Lymphocytes % 22.4 13.0 - 44.0 %    Monocytes % 8.4 2.0 - 10.0 %    Eosinophils % 5.2 0.0 - 6.0 %    Basophils % 0.9 0.0 - 2.0 %    Neutrophils Absolute 4.47 1.20 - 7.70 x10*3/uL    Immature Granulocytes Absolute, Automated 0.24 0.00 - 0.70 x10*3/uL    Lymphocytes Absolute 1.67 1.20 - 4.80 x10*3/uL    Monocytes Absolute 0.63 0.10 - 1.00 x10*3/uL    Eosinophils Absolute 0.39 0.00 - 0.70 x10*3/uL    Basophils Absolute 0.07 0.00 - 0.10 x10*3/uL   Magnesium   Result Value Ref Range    Magnesium 1.81 1.60 - 2.40 mg/dL   Hemoglobin and hematocrit, blood   Result Value Ref Range    Hemoglobin 8.7 (L)  13.5 - 17.5 g/dL    Hematocrit 25.3 (L) 41.0 - 52.0 %         Images:  XR abdomen 1 view   Final Result   1.  Mild gaseous distention of small bowel loops   2. Nasogastric tube overlies the left upper abdomen with the side   hole possibly in the esophagus. Recommend advancing the nasogastric   tube approximately 8 cm and repeating the x-ray centered on the upper   abdomen/lung bases to better evaluate tube placement.        MACRO:   None        Signed by: Cheryle Jackson 4/27/2024 10:39 AM   Dictation workstation:   [id number]      CT abdomen pelvis w IV contrast   Final Result   Cellulitis around the umbilicus        Along the umbilical tract deeper from the skin, probable 3 x 1.8 x   1.7 cm fluid abscess        The gas bubble I have annotated on axial image 105 and another on   axial 108 are superficial enough they could theoretically have been   pushed into the umbilical tract from outside        On the other hand, a cluster of gas bubbles I have annotated on axial   102 are deep enough to be immediately superficial to the mesh hernia   repair material. These bubbles are more alarming that there may be a   developing sinus tract from the umbilicus into the peritoneal space,   even potentially a burgeoning enterocutaneous fistula. Note one or   two segments of small bowel passed directly deep to and adjacent to   the left lateral margin of the hernia mesh where the deeper gas   bubbles extend. Perhaps the gas bubbles on axial 102 are from a   partially collapsed short segment of small bowel that has passed   ventral to the mesh        Note in the subcutaneous fat to the right of at, and just above the   umbilical cellulitis, there are two subcutaneous foreign bodies, one   approximately 18 mm long on sagittal 97, the other approximately 11   mm long on sagittal 95/96. The curvilinear shape suggests they could   be suture needles, although the attenuation is fairly low. Note also   the round, much higher density  foreign body in the subcutaneous fat   to the left of and well below the umbilicus on axial image 120; this   one resembles a ballistic fragment or BB        No other acute findings        MACRO:   None        Signed by: Rambo Higginbotham 4/23/2024 2:42 PM   Dictation workstation:   [id number]          Assessment and Plan  Principal Problem:    Infected hernioplasty mesh, initial encounter (CMS-HCC)  Active Problems:    Atrial fibrillation (Multi)    Benign essential hypertension    Chronic heart failure with preserved ejection fraction (Multi)    NA (obstructive sleep apnea)    64 y.o. male with history of Afib on Xarelto presenting with umbilical erythema and drainage due to infected prior hernia repair mesh now status post exploratory laparotomy with explantation of mesh and small bowel resection on 4/25/24 with ileus.    Plan:  -Continue pain control and anti-emetics  -On low fiber diet  -on LVX will transition to xarelto once wound stable  -will recheck hgb in AM, if stable will DC in the AM  - AM CBC and wound check, possible DC tomorrow morning    Discussed with attending Dr. Melissa Keller DO, PGY-2  General Surgery

## 2024-05-03 NOTE — PROGRESS NOTES
Rambo Daigle is a 64 y.o. male on day 10 of admission presenting with Infected hernioplasty mesh, initial encounter (CMS-[facility]).      Subjective   Rambo Daigle is a 64 y.o. male with Afib, HFpEF, HTN, venous insufficiency, COPD, NA, GERD, lumbar radiculopathy, obesity, umbilical hernia s/p repair presented to ED for abdominal pain. 1 week of swelling and pain around umbilicus. redness and bloody drainage. no f/c. no other recent sx. exercise tolerance fair, able to walk >1 mi and 1-2 flights of stairs. no orthopnea or exertional cp. VSS. umbilical erythema on exam. labs/imaging all wnl. ekg w/ afib, anterior q-waves. ct a/p w/ abscess and potential enterocutaneous fistula surrounding surgical mesh. s/p vanc, zosyn, protonix. admitted to surgery.     RCRI class IV, which carries an 11% risk of major CV complications, though patient able to achieve > 4 METS of activity routinely. Patient's other comorbid conditions are stable. Therefore there are no medical contraindications currently to surgery      4/26/24: Acute events overnight.  Vital stable.  Labs unremarkable.  No leukocytosis, does have slight anemia.  He is s/p Exploration Laparotomy small bowel resection with removal of mesh on 4/25/24. Remains on Zosyn. NPO at time of evaluation, not yet having flatus or Bms post-op, +burping.      4/27/2024: Patient has no passing gas. Surgery team concerned about ileus and placed NGT with suction. Patient has no acute complains of abdominal pain or vomiting. Start lovenox 1 mg/kg for Patient is unable to take oral Xarelto.     4/28/2024: Exploration Laparotomy small bowel resection with removal of mesh on 4/25/24 POD #3. Patient remained on NGT with low suction with 2 L dark green fluid. Patient has no nausea or vomiting but abdomen remained distended with decreased bowel sound.    4/29/2024 No significant events overnight, now PD#4 pt had Bm x 2 and continues to have  Flatus, Pain is controlled, occasional cough, no cp or  palpitations. Anticipate clamping of NGT today, pt encouraged to ambulate.     4.30.2024 pt with continued flatus no N/V or abdominal pain.  excessive NGT output per surgery. He continues to ambulate, taking in Ice chips no cp, sob or orthopnea.     5/1/24 NGT is out, pt w continued flatus, no n/v no cp, orthopnea or NG. Continues on Lovenox for now, will change to Xarelto when taking solid food,    5.2.24 Pt reports BM this am, tolerating liquid diet hopes to advance today. Slightly lightheaded w BM this am. Surgery reevaluated pt last night due to increased bloody drainage from wound. Wound was cleaned, repacked w surgicel, kerlex and new binder.     5/3/24 AM lovenox held due to drop in hemoglobin likely related to previous blood loss and dilutional effects of IVF, Recheck showed an improvement.  Pt tolerating soft diet, no n/v no pain, no sob.  Pt likely to go home today. If OK w surgery would restart Xarelto tonight with dinner.        Objective     Last Recorded Vitals  /84 (BP Location: Right arm, Patient Position: Sitting)   Pulse 90   Temp 36.5 °C (97.7 °F) (Temporal)   Resp 18   Wt (!) 153 kg (336 lb 12.8 oz)   SpO2 99%   Intake/Output last 3 Shifts:    Intake/Output Summary (Last 24 hours) at 5/3/2024 1227  Last data filed at 5/3/2024 0938  Gross per 24 hour   Intake 480 ml   Output 250 ml   Net 230 ml       Admission Weight  Weight: (!) 154 kg (340 lb) (04/23/24 1058)    Daily Weight  04/23/24 : (!) 153 kg (336 lb 12.8 oz)    Image Results  XR abdomen 1 view  Narrative: Interpreted By:  Cheryle Jackson,   STUDY:  XR ABDOMEN 1 VIEW;  4/27/2024 9:43 am      INDICATION:  Signs/Symptoms:NG tube placement.      COMPARISON:  None.      ACCESSION NUMBER(S):  [id number]      ORDERING CLINICIAN:  COLIN NAJERA      FINDINGS:  Supine views of the abdomen show a nasogastric tube overlying the  left upper abdomen with the side hole possibly in the distal  esophagus. Recommend advancing the nasogastric  tube and repeating the  x-ray centered on the abdomen.      There is mild gaseous distention of small-bowel loops. Skin staples  overlie the lower abdomen. There are bilateral hip joint replacements  noted. Degenerative changes are seen in the spine.      Impression: 1.  Mild gaseous distention of small bowel loops  2. Nasogastric tube overlies the left upper abdomen with the side  hole possibly in the esophagus. Recommend advancing the nasogastric  tube approximately 8 cm and repeating the x-ray centered on the upper  abdomen/lung bases to better evaluate tube placement.      MACRO:  None      Signed by: Cheryle Jackson 4/27/2024 10:39 AM  Dictation workstation:   [id number]      Physical Exam  Constitutional:       General: He is not in acute distress.     Appearance: Normal appearance.   HENT:      Head: Normocephalic.      Mouth/Throat:      Mouth: Mucous membranes are moist.      Pharynx: Oropharynx is clear.   Eyes:      Pupils: Pupils are equal, round, and reactive to light.   Cardiovascular:      Rate and Rhythm: Normal rate. Rhythm irregular.      Heart sounds: Normal heart sounds. No murmur heard.     No gallop.   Pulmonary:      Effort: Pulmonary effort is normal.      Breath sounds: Normal breath sounds.   Abdominal:      General: Bowel sounds are normal. There is distension.      Palpations: Abdomen is soft.      Tenderness: There is no abdominal tenderness. There is no guarding.   Musculoskeletal:         General: No swelling. Normal range of motion.      Cervical back: Neck supple. No rigidity.   Skin:     General: Skin is warm and dry.   Neurological:      General: No focal deficit present.      Mental Status: He is alert.      Cranial Nerves: No cranial nerve deficit.      Sensory: No sensory deficit.   Psychiatric:         Mood and Affect: Mood normal.         Behavior: Behavior normal.         Relevant Results             Scheduled medications  dilTIAZem CD, 120 mg, oral, Daily  [Held by provider]  enoxaparin, 1 mg/kg, subcutaneous, BID  furosemide, 20 mg, oral, Daily  pantoprazole, 40 mg, oral, Daily before breakfast   Or  pantoprazole, 40 mg, intravenous, Daily before breakfast  piperacillin-tazobactam, 3.375 g, intravenous, q6h  pneumococcal conjugate, 0.5 mL, intramuscular, During hospitalization  [Held by provider] rivaroxaban, 20 mg, oral, Daily      Continuous medications       PRN medications  PRN medications: acetaminophen **OR** acetaminophen **OR** acetaminophen, benzocaine-menthol, hydrALAZINE, HYDROmorphone, ipratropium-albuteroL, ondansetron **OR** ondansetron, phenoL, surgicel hemostat 4 x 8  Results for orders placed or performed during the hospital encounter of 04/23/24 (from the past 96 hour(s))   Basic Metabolic Panel   Result Value Ref Range    Glucose 114 (H) 74 - 99 mg/dL    Sodium 139 136 - 145 mmol/L    Potassium 3.7 3.5 - 5.3 mmol/L    Chloride 105 98 - 107 mmol/L    Bicarbonate 26 21 - 32 mmol/L    Anion Gap 12 10 - 20 mmol/L    Urea Nitrogen 15 6 - 23 mg/dL    Creatinine 0.99 0.50 - 1.30 mg/dL    eGFR 84 >60 mL/min/1.73m*2    Calcium 8.7 8.6 - 10.3 mg/dL   CBC and Auto Differential   Result Value Ref Range    WBC 6.3 4.4 - 11.3 x10*3/uL    nRBC 0.0 0.0 - 0.0 /100 WBCs    RBC 4.32 (L) 4.50 - 5.90 x10*6/uL    Hemoglobin 13.9 13.5 - 17.5 g/dL    Hematocrit 40.7 (L) 41.0 - 52.0 %    MCV 94 80 - 100 fL    MCH 32.2 26.0 - 34.0 pg    MCHC 34.2 32.0 - 36.0 g/dL    RDW 12.9 11.5 - 14.5 %    Platelets 327 150 - 450 x10*3/uL    Neutrophils % 67.0 40.0 - 80.0 %    Immature Granulocytes %, Automated 0.8 0.0 - 0.9 %    Lymphocytes % 16.9 13.0 - 44.0 %    Monocytes % 9.0 2.0 - 10.0 %    Eosinophils % 5.4 0.0 - 6.0 %    Basophils % 0.9 0.0 - 2.0 %    Neutrophils Absolute 4.25 1.20 - 7.70 x10*3/uL    Immature Granulocytes Absolute, Automated 0.05 0.00 - 0.70 x10*3/uL    Lymphocytes Absolute 1.07 (L) 1.20 - 4.80 x10*3/uL    Monocytes Absolute 0.57 0.10 - 1.00 x10*3/uL    Eosinophils Absolute 0.34 0.00  - 0.70 x10*3/uL    Basophils Absolute 0.06 0.00 - 0.10 x10*3/uL   Magnesium   Result Value Ref Range    Magnesium 2.00 1.60 - 2.40 mg/dL   Comprehensive Metabolic Panel   Result Value Ref Range    Glucose 120 (H) 74 - 99 mg/dL    Sodium 139 136 - 145 mmol/L    Potassium 3.5 3.5 - 5.3 mmol/L    Chloride 106 98 - 107 mmol/L    Bicarbonate 25 21 - 32 mmol/L    Anion Gap 12 10 - 20 mmol/L    Urea Nitrogen 14 6 - 23 mg/dL    Creatinine 0.96 0.50 - 1.30 mg/dL    eGFR 87 >60 mL/min/1.73m*2    Calcium 8.2 (L) 8.6 - 10.3 mg/dL    Albumin 3.2 (L) 3.4 - 5.0 g/dL    Alkaline Phosphatase 76 33 - 136 U/L    Total Protein 6.1 (L) 6.4 - 8.2 g/dL    AST 51 (H) 9 - 39 U/L    Bilirubin, Total 1.1 0.0 - 1.2 mg/dL    ALT 49 10 - 52 U/L   CBC and Auto Differential   Result Value Ref Range    WBC 6.3 4.4 - 11.3 x10*3/uL    nRBC 0.0 0.0 - 0.0 /100 WBCs    RBC 3.71 (L) 4.50 - 5.90 x10*6/uL    Hemoglobin 11.5 (L) 13.5 - 17.5 g/dL    Hematocrit 35.1 (L) 41.0 - 52.0 %    MCV 95 80 - 100 fL    MCH 31.0 26.0 - 34.0 pg    MCHC 32.8 32.0 - 36.0 g/dL    RDW 13.0 11.5 - 14.5 %    Platelets 309 150 - 450 x10*3/uL    Neutrophils % 72.3 40.0 - 80.0 %    Immature Granulocytes %, Automated 0.5 0.0 - 0.9 %    Lymphocytes % 14.2 13.0 - 44.0 %    Monocytes % 7.6 2.0 - 10.0 %    Eosinophils % 4.8 0.0 - 6.0 %    Basophils % 0.6 0.0 - 2.0 %    Neutrophils Absolute 4.54 1.20 - 7.70 x10*3/uL    Immature Granulocytes Absolute, Automated 0.03 0.00 - 0.70 x10*3/uL    Lymphocytes Absolute 0.89 (L) 1.20 - 4.80 x10*3/uL    Monocytes Absolute 0.48 0.10 - 1.00 x10*3/uL    Eosinophils Absolute 0.30 0.00 - 0.70 x10*3/uL    Basophils Absolute 0.04 0.00 - 0.10 x10*3/uL   Basic Metabolic Panel   Result Value Ref Range    Glucose 105 (H) 74 - 99 mg/dL    Sodium 134 (L) 136 - 145 mmol/L    Potassium 3.6 3.5 - 5.3 mmol/L    Chloride 102 98 - 107 mmol/L    Bicarbonate 26 21 - 32 mmol/L    Anion Gap 10 10 - 20 mmol/L    Urea Nitrogen 13 6 - 23 mg/dL    Creatinine 0.93 0.50 - 1.30  mg/dL    eGFR >90 >60 mL/min/1.73m*2    Calcium 8.1 (L) 8.6 - 10.3 mg/dL   CBC and Auto Differential   Result Value Ref Range    WBC 7.5 4.4 - 11.3 x10*3/uL    nRBC 0.0 0.0 - 0.0 /100 WBCs    RBC 2.57 (L) 4.50 - 5.90 x10*6/uL    Hemoglobin 8.0 (L) 13.5 - 17.5 g/dL    Hematocrit 24.3 (L) 41.0 - 52.0 %    MCV 95 80 - 100 fL    MCH 31.1 26.0 - 34.0 pg    MCHC 32.9 32.0 - 36.0 g/dL    RDW 13.2 11.5 - 14.5 %    Platelets 290 150 - 450 x10*3/uL    Neutrophils % 59.9 40.0 - 80.0 %    Immature Granulocytes %, Automated 3.2 (H) 0.0 - 0.9 %    Lymphocytes % 22.4 13.0 - 44.0 %    Monocytes % 8.4 2.0 - 10.0 %    Eosinophils % 5.2 0.0 - 6.0 %    Basophils % 0.9 0.0 - 2.0 %    Neutrophils Absolute 4.47 1.20 - 7.70 x10*3/uL    Immature Granulocytes Absolute, Automated 0.24 0.00 - 0.70 x10*3/uL    Lymphocytes Absolute 1.67 1.20 - 4.80 x10*3/uL    Monocytes Absolute 0.63 0.10 - 1.00 x10*3/uL    Eosinophils Absolute 0.39 0.00 - 0.70 x10*3/uL    Basophils Absolute 0.07 0.00 - 0.10 x10*3/uL   Magnesium   Result Value Ref Range    Magnesium 1.81 1.60 - 2.40 mg/dL   Hemoglobin and hematocrit, blood   Result Value Ref Range    Hemoglobin 8.7 (L) 13.5 - 17.5 g/dL    Hematocrit 25.3 (L) 41.0 - 52.0 %       Assessment/Plan            Principal Problem:    Infected hernioplasty mesh, initial encounter (CMS-HCC)  Active Problems:    Atrial fibrillation (Multi)    Benign essential hypertension    Chronic heart failure with preserved ejection fraction (Multi)    NA (obstructive sleep apnea)      Infected hernioplasty mesh  S/p removal of mesh and lap sb resection w post op ileus    Pt appears well hydrated on IVF, lytes are stable    NGT is out on CLD, continues on zosyn    Permanent Afib  Rate is controlled  AC held this am due to concern for blood loss, appears stable  Awaiting surgical follow up consider restarting Xarelto tonight w dinner    Benign essential hypertension  Stable continue diltiazem    Chronic diastolic CHF  Appears  compensated at this time      NA  Hs CPAP           There do not appear to be any medical contraindications to safe discharge pending surgical opinion on post operative wound.          Michel Vargas, APRN-CNP

## 2024-05-03 NOTE — CARE PLAN
Problem: Pain  Goal: My pain/discomfort is manageable  Outcome: Progressing     Problem: Daily Care  Goal: Daily care needs are met  Outcome: Progressing     Problem: Discharge Barriers  Goal: My discharge needs are met  Outcome: Progressing     Problem: Safety  Goal: Patient will be injury free during hospitalization  Outcome: Progressing  Goal: I will remain free of falls  Outcome: Progressing     Problem: Psychosocial Needs  Goal: Demonstrates ability to cope with hospitalization/illness  Outcome: Progressing  Goal: Collaborate with me, my family, and caregiver to identify my specific goals  Outcome: Progressing     Problem: Pain  Goal: Takes deep breaths with improved pain control throughout the shift  Outcome: Progressing  Goal: Turns in bed with improved pain control throughout the shift  Outcome: Progressing  Goal: Walks with improved pain control throughout the shift  Outcome: Progressing  Goal: Performs ADL's with improved pain control throughout shift  Outcome: Progressing  Goal: Participates in PT with improved pain control throughout the shift  Outcome: Progressing  Goal: Free from opioid side effects throughout the shift  Outcome: Progressing  Goal: Free from acute confusion related to pain meds throughout the shift  Outcome: Progressing     Problem: Pain - Adult  Goal: Verbalizes/displays adequate comfort level or baseline comfort level  Outcome: Progressing     Problem: Safety - Adult  Goal: Free from fall injury  Outcome: Progressing     Problem: Discharge Planning  Goal: Discharge to home or other facility with appropriate resources  Outcome: Progressing     Problem: Chronic Conditions and Co-morbidities  Goal: Patient's chronic conditions and co-morbidity symptoms are monitored and maintained or improved  Outcome: Progressing     Problem: Skin  Goal: Decreased wound size/increased tissue granulation at next dressing change  Outcome: Progressing  Goal: Participates in plan/prevention/treatment  measures  Outcome: Progressing  Goal: Prevent/manage excess moisture  Outcome: Progressing  Goal: Prevent/minimize sheer/friction injuries  Outcome: Progressing  Goal: Promote/optimize nutrition  Outcome: Progressing  Goal: Promote skin healing  Outcome: Progressing     Problem: Nutrition  Goal: Promote healing  Outcome: Progressing  Goal: Oral intake greater 75%  Outcome: Progressing  Goal: Maintain stable weight  Outcome: Progressing     Problem: Heart Failure  Goal: Improved gas exchange this shift  Outcome: Progressing  Goal: Improved urinary output this shift  Outcome: Progressing  Goal: Reduction in peripheral edema within 24 hours  Outcome: Progressing  Goal: Report improvement of dyspnea/breathlessness this shift  Outcome: Progressing  Goal: Weight from fluid excess reduced over 2-3 days, then stabilize  Outcome: Progressing  Goal: Increase self care and/or family involvement in 24 hours  Outcome: Progressing   The patient's goals for the shift include      The clinical goals for the shift include Pt will remain safe

## 2024-05-04 VITALS
DIASTOLIC BLOOD PRESSURE: 82 MMHG | RESPIRATION RATE: 18 BRPM | SYSTOLIC BLOOD PRESSURE: 112 MMHG | HEART RATE: 85 BPM | OXYGEN SATURATION: 99 % | TEMPERATURE: 97.4 F | BODY MASS INDEX: 37.19 KG/M2 | WEIGHT: 315 LBS | HEIGHT: 77 IN

## 2024-05-04 LAB
BASOPHILS # BLD AUTO: 0.09 X10*3/UL (ref 0–0.1)
BASOPHILS NFR BLD AUTO: 1.3 %
EOSINOPHIL # BLD AUTO: 0.43 X10*3/UL (ref 0–0.7)
EOSINOPHIL NFR BLD AUTO: 6 %
ERYTHROCYTE [DISTWIDTH] IN BLOOD BY AUTOMATED COUNT: 13.4 % (ref 11.5–14.5)
HCT VFR BLD AUTO: 22.2 % (ref 41–52)
HCT VFR BLD AUTO: 24.5 % (ref 41–52)
HGB BLD-MCNC: 7.2 G/DL (ref 13.5–17.5)
HGB BLD-MCNC: 7.9 G/DL (ref 13.5–17.5)
IMM GRANULOCYTES # BLD AUTO: 0.35 X10*3/UL (ref 0–0.7)
IMM GRANULOCYTES NFR BLD AUTO: 4.9 % (ref 0–0.9)
LYMPHOCYTES # BLD AUTO: 1.48 X10*3/UL (ref 1.2–4.8)
LYMPHOCYTES NFR BLD AUTO: 20.8 %
MCH RBC QN AUTO: 31 PG (ref 26–34)
MCHC RBC AUTO-ENTMCNC: 32.4 G/DL (ref 32–36)
MCV RBC AUTO: 96 FL (ref 80–100)
MONOCYTES # BLD AUTO: 0.65 X10*3/UL (ref 0.1–1)
MONOCYTES NFR BLD AUTO: 9.1 %
NEUTROPHILS # BLD AUTO: 4.13 X10*3/UL (ref 1.2–7.7)
NEUTROPHILS NFR BLD AUTO: 57.9 %
NRBC BLD-RTO: 0 /100 WBCS (ref 0–0)
PLATELET # BLD AUTO: 300 X10*3/UL (ref 150–450)
RBC # BLD AUTO: 2.32 X10*6/UL (ref 4.5–5.9)
WBC # BLD AUTO: 7.1 X10*3/UL (ref 4.4–11.3)

## 2024-05-04 PROCEDURE — 2500000004 HC RX 250 GENERAL PHARMACY W/ HCPCS (ALT 636 FOR OP/ED): Performed by: SURGERY

## 2024-05-04 PROCEDURE — 85025 COMPLETE CBC W/AUTO DIFF WBC: CPT

## 2024-05-04 PROCEDURE — 36415 COLL VENOUS BLD VENIPUNCTURE: CPT

## 2024-05-04 PROCEDURE — 99024 POSTOP FOLLOW-UP VISIT: CPT | Performed by: SURGERY

## 2024-05-04 PROCEDURE — 2500000001 HC RX 250 WO HCPCS SELF ADMINISTERED DRUGS (ALT 637 FOR MEDICARE OP): Performed by: SURGERY

## 2024-05-04 PROCEDURE — 2500000004 HC RX 250 GENERAL PHARMACY W/ HCPCS (ALT 636 FOR OP/ED)

## 2024-05-04 PROCEDURE — 99232 SBSQ HOSP IP/OBS MODERATE 35: CPT | Performed by: NURSE PRACTITIONER

## 2024-05-04 PROCEDURE — 85014 HEMATOCRIT: CPT

## 2024-05-04 RX ADMIN — ENOXAPARIN SODIUM 150 MG: 300 INJECTION INTRAVENOUS; SUBCUTANEOUS at 12:33

## 2024-05-04 RX ADMIN — PIPERACILLIN SODIUM AND TAZOBACTAM SODIUM 3.38 G: 3; .375 INJECTION, SOLUTION INTRAVENOUS at 05:50

## 2024-05-04 RX ADMIN — PANTOPRAZOLE SODIUM 40 MG: 40 TABLET, DELAYED RELEASE ORAL at 05:50

## 2024-05-04 ASSESSMENT — COGNITIVE AND FUNCTIONAL STATUS - GENERAL
DAILY ACTIVITIY SCORE: 24
MOBILITY SCORE: 24

## 2024-05-04 ASSESSMENT — PAIN - FUNCTIONAL ASSESSMENT: PAIN_FUNCTIONAL_ASSESSMENT: 0-10

## 2024-05-04 ASSESSMENT — ENCOUNTER SYMPTOMS
FEVER: 0
ABDOMINAL PAIN: 0
CHEST TIGHTNESS: 0
HEADACHES: 0
CHILLS: 0
SHORTNESS OF BREATH: 0
DIFFICULTY URINATING: 0
NAUSEA: 0
JOINT SWELLING: 0

## 2024-05-04 ASSESSMENT — PAIN SCALES - GENERAL: PAINLEVEL_OUTOF10: 0 - NO PAIN

## 2024-05-04 NOTE — CARE PLAN
Problem: Pain  Goal: My pain/discomfort is manageable  Outcome: Progressing     Problem: Daily Care  Goal: Daily care needs are met  Outcome: Progressing     Problem: Discharge Barriers  Goal: My discharge needs are met  Outcome: Progressing     Problem: Safety  Goal: Patient will be injury free during hospitalization  Outcome: Progressing  Goal: I will remain free of falls  Outcome: Progressing     Problem: Psychosocial Needs  Goal: Demonstrates ability to cope with hospitalization/illness  Outcome: Progressing  Goal: Collaborate with me, my family, and caregiver to identify my specific goals  Outcome: Progressing     Problem: Pain  Goal: Takes deep breaths with improved pain control throughout the shift  Outcome: Progressing  Goal: Turns in bed with improved pain control throughout the shift  Outcome: Progressing  Goal: Walks with improved pain control throughout the shift  Outcome: Progressing  Goal: Performs ADL's with improved pain control throughout shift  Outcome: Progressing  Goal: Participates in PT with improved pain control throughout the shift  Outcome: Progressing  Goal: Free from opioid side effects throughout the shift  Outcome: Progressing  Goal: Free from acute confusion related to pain meds throughout the shift  Outcome: Progressing     Problem: Pain - Adult  Goal: Verbalizes/displays adequate comfort level or baseline comfort level  Outcome: Progressing     Problem: Safety - Adult  Goal: Free from fall injury  Outcome: Progressing     Problem: Discharge Planning  Goal: Discharge to home or other facility with appropriate resources  Outcome: Progressing     Problem: Chronic Conditions and Co-morbidities  Goal: Patient's chronic conditions and co-morbidity symptoms are monitored and maintained or improved  Outcome: Progressing     Problem: Skin  Goal: Decreased wound size/increased tissue granulation at next dressing change  Outcome: Progressing  Flowsheets (Taken 5/4/2024 0397)  Decreased wound  size/increased tissue granulation at next dressing change:   Promote sleep for wound healing   Protective dressings over bony prominences  Goal: Participates in plan/prevention/treatment measures  Outcome: Progressing  Flowsheets (Taken 5/4/2024 0803)  Participates in plan/prevention/treatment measures:   Discuss with provider PT/OT consult   Elevate heels   Increase activity/out of bed for meals  Goal: Prevent/manage excess moisture  Outcome: Progressing  Flowsheets (Taken 5/4/2024 0803)  Prevent/manage excess moisture:   Follow provider orders for dressing changes   Monitor for/manage infection if present  Goal: Prevent/minimize sheer/friction injuries  Outcome: Progressing  Flowsheets (Taken 5/4/2024 0803)  Prevent/minimize sheer/friction injuries:   Increase activity/out of bed for meals   Turn/reposition every 2 hours/use positioning/transfer devices  Goal: Promote/optimize nutrition  Outcome: Progressing  Flowsheets (Taken 5/4/2024 0803)  Promote/optimize nutrition:   Consume > 50% meals/supplements   Monitor/record intake including meals   Offer water/supplements/favorite foods  Goal: Promote skin healing  Outcome: Progressing  Flowsheets (Taken 5/4/2024 0803)  Promote skin healing:   Assess skin/pad under line(s)/device(s)   Protective dressings over bony prominences   Turn/reposition every 2 hours/use positioning/transfer devices     Problem: Nutrition  Goal: Promote healing  Outcome: Progressing  Goal: Oral intake greater 75%  Outcome: Progressing  Goal: Maintain stable weight  Outcome: Progressing     Problem: Heart Failure  Goal: Improved gas exchange this shift  Outcome: Progressing  Goal: Improved urinary output this shift  Outcome: Progressing  Goal: Reduction in peripheral edema within 24 hours  Outcome: Progressing  Goal: Report improvement of dyspnea/breathlessness this shift  Outcome: Progressing  Goal: Weight from fluid excess reduced over 2-3 days, then stabilize  Outcome: Progressing  Goal:  Increase self care and/or family involvement in 24 hours  Outcome: Progressing   The patient's goals for the shift include      The clinical goals for the shift include pt will remain hemodynamically stable

## 2024-05-04 NOTE — PROGRESS NOTES
"GENERAL SURGERY PROGRESS NOTE    Rambo Daigle   1960   [id number]     Rambo Daigle is a 64 y.o. male on day 11 of admission presenting with Infected hernioplasty mesh, initial encounter (CMS-[facility]).    Exploration Laparotomy small bowel resection with removal of mesh on 4/25/24, 9 Days Post-Op    Subjective  PT MICHAEL, tolerating regular diet. Midline incision hemostatic with surgicel. Patient denies F/C/N/V/D/CP/SOB. Patient denies any blood in his BM, has not noticed any back pain, new bruising or swelling. Denies any abdominal pain or bloating. On exam no exam findings of a source of bleeding. Hgb yesterday was 8.7, and this morning 7.2. Afeb VSS    Review of Systems:  Review of Systems   Constitutional:  Negative for chills and fever.   Respiratory:  Negative for chest tightness and shortness of breath.    Cardiovascular:  Negative for chest pain and leg swelling.   Gastrointestinal:  Negative for abdominal pain and nausea.   Genitourinary:  Negative for difficulty urinating.   Musculoskeletal:  Negative for joint swelling.   Neurological:  Negative for headaches.       Objective    Last Recorded Vitals  Blood pressure 123/74, pulse 75, temperature 36.4 °C (97.6 °F), temperature source Temporal, resp. rate 18, height 1.956 m (6' 5\"), weight (!) 153 kg (336 lb 12.8 oz), SpO2 99%.    Intake/Output last 3 Shifts:  I/O last 3 completed shifts:  In: 1900 (12.4 mL/kg) [P.O.:1900]  Out: 1400 (9.2 mL/kg) [Urine:1400 (0.3 mL/kg/hr)]  Weight: 152.8 kg     Intake/Output Summary (Last 24 hours) at 5/4/2024 0863  Last data filed at 5/4/2024 0545  Gross per 24 hour   Intake 1900 ml   Output 1150 ml   Net 750 ml       Physical Exam  Vitals reviewed.   Constitutional:       General: He is not in acute distress.  HENT:      Head: Normocephalic and atraumatic.      Mouth/Throat:      Mouth: Mucous membranes are moist.      Pharynx: Oropharynx is clear.   Eyes:      General: No scleral icterus.     Extraocular Movements: Extraocular " movements intact.      Conjunctiva/sclera: Conjunctivae normal.   Cardiovascular:      Rate and Rhythm: Normal rate and regular rhythm.      Pulses: Normal pulses.   Pulmonary:      Effort: Pulmonary effort is normal. No respiratory distress.   Abdominal:      Comments: Soft, non distended, non tympanic, non tender, superficial clot and surgicel on inferior portion of midline incision, wound is hemostatic, some ecchymosis to lovenox injection sites, no flank or umbilcal bruising   Musculoskeletal:         General: No swelling, tenderness, deformity or signs of injury. Normal range of motion.      Cervical back: Neck supple.   Skin:     General: Skin is warm and dry.   Neurological:      General: No focal deficit present.      Mental Status: He is alert and oriented to person, place, and time.         Relevant Results  Labs:  Results for orders placed or performed during the hospital encounter of 04/23/24 (from the past 24 hour(s))   Hemoglobin and hematocrit, blood   Result Value Ref Range    Hemoglobin 8.7 (L) 13.5 - 17.5 g/dL    Hematocrit 25.3 (L) 41.0 - 52.0 %   CBC and Auto Differential   Result Value Ref Range    WBC 7.1 4.4 - 11.3 x10*3/uL    nRBC 0.0 0.0 - 0.0 /100 WBCs    RBC 2.32 (L) 4.50 - 5.90 x10*6/uL    Hemoglobin 7.2 (L) 13.5 - 17.5 g/dL    Hematocrit 22.2 (L) 41.0 - 52.0 %    MCV 96 80 - 100 fL    MCH 31.0 26.0 - 34.0 pg    MCHC 32.4 32.0 - 36.0 g/dL    RDW 13.4 11.5 - 14.5 %    Platelets 300 150 - 450 x10*3/uL    Neutrophils % 57.9 40.0 - 80.0 %    Immature Granulocytes %, Automated 4.9 (H) 0.0 - 0.9 %    Lymphocytes % 20.8 13.0 - 44.0 %    Monocytes % 9.1 2.0 - 10.0 %    Eosinophils % 6.0 0.0 - 6.0 %    Basophils % 1.3 0.0 - 2.0 %    Neutrophils Absolute 4.13 1.20 - 7.70 x10*3/uL    Immature Granulocytes Absolute, Automated 0.35 0.00 - 0.70 x10*3/uL    Lymphocytes Absolute 1.48 1.20 - 4.80 x10*3/uL    Monocytes Absolute 0.65 0.10 - 1.00 x10*3/uL    Eosinophils Absolute 0.43 0.00 - 0.70 x10*3/uL     Basophils Absolute 0.09 0.00 - 0.10 x10*3/uL         Images:  XR abdomen 1 view   Final Result   1.  Mild gaseous distention of small bowel loops   2. Nasogastric tube overlies the left upper abdomen with the side   hole possibly in the esophagus. Recommend advancing the nasogastric   tube approximately 8 cm and repeating the x-ray centered on the upper   abdomen/lung bases to better evaluate tube placement.        MACRO:   None        Signed by: Cheryle Jackson 4/27/2024 10:39 AM   Dictation workstation:   [id number]      CT abdomen pelvis w IV contrast   Final Result   Cellulitis around the umbilicus        Along the umbilical tract deeper from the skin, probable 3 x 1.8 x   1.7 cm fluid abscess        The gas bubble I have annotated on axial image 105 and another on   axial 108 are superficial enough they could theoretically have been   pushed into the umbilical tract from outside        On the other hand, a cluster of gas bubbles I have annotated on axial   102 are deep enough to be immediately superficial to the mesh hernia   repair material. These bubbles are more alarming that there may be a   developing sinus tract from the umbilicus into the peritoneal space,   even potentially a burgeoning enterocutaneous fistula. Note one or   two segments of small bowel passed directly deep to and adjacent to   the left lateral margin of the hernia mesh where the deeper gas   bubbles extend. Perhaps the gas bubbles on axial 102 are from a   partially collapsed short segment of small bowel that has passed   ventral to the mesh        Note in the subcutaneous fat to the right of at, and just above the   umbilical cellulitis, there are two subcutaneous foreign bodies, one   approximately 18 mm long on sagittal 97, the other approximately 11   mm long on sagittal 95/96. The curvilinear shape suggests they could   be suture needles, although the attenuation is fairly low. Note also   the round, much higher density foreign body  in the subcutaneous fat   to the left of and well below the umbilicus on axial image 120; this   one resembles a ballistic fragment or BB        No other acute findings        MACRO:   None        Signed by: aRmbo Higginbotham 4/23/2024 2:42 PM   Dictation workstation:   [id number]          Assessment and Plan  Principal Problem:    Infected hernioplasty mesh, initial encounter (CMS-[facility])  Active Problems:    Atrial fibrillation (Multi)    Benign essential hypertension    Chronic heart failure with preserved ejection fraction (Multi)    NA (obstructive sleep apnea)    64 y.o. male with history of Afib on Xarelto presenting with umbilical erythema and drainage due to infected prior hernia repair mesh now status post exploratory laparotomy with explantation of mesh and small bowel resection on 4/25/24 with ileus.    Plan:  -Continue pain control and anti-emetics  -On low fiber diet, placed on NPO till repeat hgb results  -AC on hold till hgb stable, once stable will resume LVX and transition to xarelto on discharge  -will recheck hgb in AM, if still dropping will transfuse for hgb <7   - If hgb unstable will assess with CT abdomen and pelvis with IV contrast to check for source  - If hgb stable and work up negative possible DC this evening    Discussed with attending Dr. Ken Keller DO, PGY-2  General Surgery

## 2024-05-04 NOTE — DISCHARGE SUMMARY
Discharge Diagnosis  Infected hernioplasty mesh, initial encounter (CMS-[facility])    Issues Requiring Follow-Up  Post op explantation of hernia mesh    Test Results Pending At Discharge  Pending Labs       No current pending labs.            Hospital Course   Patient is a 64 year old male presents to the Emergency Department on 4/23 with a 1 week history of swelling and a throbbing pain to the umbilical region. He also reported redness and drainage. On labs and imaging it was revealed he had no evidence of sepsis but on CT imaging had shown evidence of cellulitis, fluid abscess, and possible developing sinus tract this am. He was taken to the OR for Exploration Laparotomy small bowel resection with removal of mesh. For full details please see the operative note. Post operatively his course was complicated by an operative illeus which resolved with conservative management with NG.  He was on IV abx for a total of 7 days. On post operative day 5 his NG tube was removed and by POD 7 he was on a regular diet. Post operative day 6-7 he developed bleeding from his midline incision. The bleeding was resolved with surgicel. He had a drop in his hgb which was trended and stabilized. Prior to discharge he was afebrile, normotensive, non tachycardic, on a regular diet. He was given instructions on resuming his xarelto in the evening and s/s to watch out for. He is to follow up with his PCP in 1 week and Dr. Torres in 1 week.    Pertinent Physical Exam At Time of Discharge  Physical Exam  Vitals reviewed.   Constitutional:       General: He is not in acute distress.  HENT:      Head: Normocephalic and atraumatic.   Eyes:      Extraocular Movements: Extraocular movements intact.      Conjunctiva/sclera: Conjunctivae normal.   Cardiovascular:      Rate and Rhythm: Normal rate and regular rhythm.      Pulses: Normal pulses.   Pulmonary:      Effort: Pulmonary effort is normal. No respiratory distress.   Abdominal:      Comments: Soft,  non distended, non tympanic, non tender, superficial clot and surgicel on inferior portion of midline incision, wound is hemostatic   Musculoskeletal:         General: Normal range of motion.      Cervical back: Neck supple.   Skin:     General: Skin is warm and dry.   Neurological:      General: No focal deficit present.      Mental Status: He is alert and oriented to person, place, and time    Home Medications     Medication List      CONTINUE taking these medications     cholecalciferol 125 MCG (5000 UT) capsule; Commonly known as: Vitamin   D-3   dilTIAZem  mg 24 hr capsule; Commonly known as: Cardizem CD   furosemide 20 mg tablet; Commonly known as: Lasix   multivitamin tablet   rivaroxaban 20 mg tablet; Commonly known as: Xarelto; Take 1 tablet (20   mg) by mouth once daily.       Outpatient Follow-Up  Future Appointments   Date Time Provider Department Center   7/24/2024  8:40 AM Dexter Torres MD [id number]   Dr. Torres in 1 week, and PCP in 1 week    Toy Keller,  PGY-2

## 2024-05-04 NOTE — DISCHARGE INSTRUCTIONS
OK for discharge home. Resume Xarelto this evenings dose at home. Avoid excessive bending or lifting greater than 15 lbs. Cover wound with 4x4 and abd as needed for drainage. Follow up with PCP in 1 week and Dr. Torres in 1-2 weeks. Staples will be removed at outpatient follow up.

## 2024-05-04 NOTE — PROGRESS NOTES
Rambo Daigle is a 64 y.o. male on day 11 of admission presenting with Infected hernioplasty mesh, initial encounter ([facility], located within [facility]).      Subjective   Rambo Daigle is a 64 y.o. male with Afib, HFpEF, HTN, venous insufficiency, COPD, NA, GERD, lumbar radiculopathy, obesity, umbilical hernia s/p repair presented to ED for abdominal pain. 1 week of swelling and pain around umbilicus. redness and bloody drainage. no f/c. no other recent sx. exercise tolerance fair, able to walk >1 mi and 1-2 flights of stairs. no orthopnea or exertional cp. VSS. umbilical erythema on exam. labs/imaging all wnl. ekg w/ afib, anterior q-waves. ct a/p w/ abscess and potential enterocutaneous fistula surrounding surgical mesh. s/p vanc, zosyn, protonix. admitted to surgery.     RCRI class IV, which carries an 11% risk of major CV complications, though patient able to achieve > 4 METS of activity routinely. Patient's other comorbid conditions are stable. Therefore there are no medical contraindications currently to surgery      4/26/24: Acute events overnight.  Vital stable.  Labs unremarkable.  No leukocytosis, does have slight anemia.  He is s/p Exploration Laparotomy small bowel resection with removal of mesh on 4/25/24. Remains on Zosyn. NPO at time of evaluation, not yet having flatus or Bms post-op, +burping.      4/27/2024: Patient has no passing gas. Surgery team concerned about ileus and placed NGT with suction. Patient has no acute complains of abdominal pain or vomiting. Start lovenox 1 mg/kg for Patient is unable to take oral Xarelto.     4/28/2024: Exploration Laparotomy small bowel resection with removal of mesh on 4/25/24 POD #3. Patient remained on NGT with low suction with 2 L dark green fluid. Patient has no nausea or vomiting but abdomen remained distended with decreased bowel sound.    4/29/2024 No significant events overnight, now PD#4 pt had Bm x 2 and continues to have  Flatus, Pain is controlled, occasional cough, no cp or  palpitations. Anticipate clamping of NGT today, pt encouraged to ambulate.     4.30.2024 pt with continued flatus no N/V or abdominal pain.  excessive NGT output per surgery. He continues to ambulate, taking in Ice chips no cp, sob or orthopnea.     5/1/24 NGT is out, pt w continued flatus, no n/v no cp, orthopnea or NG. Continues on Lovenox for now, will change to Xarelto when taking solid food,    5.2.24 Pt reports BM this am, tolerating liquid diet hopes to advance today. Slightly lightheaded w BM this am. Surgery reevaluated pt last night due to increased bloody drainage from wound. Wound was cleaned, repacked w surgicel, kerlex and new binder.     5/3/24 AM lovenox held due to drop in hemoglobin likely related to previous blood loss and dilutional effects of IVF, Recheck showed an improvement.  Pt tolerating soft diet, no n/v no pain, no sob.  Pt likely to go home today. If OK w surgery would restart Xarelto tonight with dinner.     5/4/24 Pt is eating ok, no cp sob. Ambulating w/o NG. Hbg w new drop overnight. No obvious no bleeding at wound site, Anticoagulation on hold due to bleeding.        Objective     Last Recorded Vitals  /82 (BP Location: Right arm, Patient Position: Sitting)   Pulse 85   Temp 36.3 °C (97.4 °F) (Temporal)   Resp 18   Wt (!) 153 kg (336 lb 12.8 oz)   SpO2 99%   Intake/Output last 3 Shifts:    Intake/Output Summary (Last 24 hours) at 5/4/2024 1051  Last data filed at 5/4/2024 0545  Gross per 24 hour   Intake 1660 ml   Output 1150 ml   Net 510 ml       Admission Weight  Weight: (!) 154 kg (340 lb) (04/23/24 1058)    Daily Weight  04/23/24 : (!) 153 kg (336 lb 12.8 oz)    Image Results  XR abdomen 1 view  Narrative: Interpreted By:  Cheryle Jackson,   STUDY:  XR ABDOMEN 1 VIEW;  4/27/2024 9:43 am      INDICATION:  Signs/Symptoms:NG tube placement.      COMPARISON:  None.      ACCESSION NUMBER(S):  [id number]      ORDERING CLINICIAN:  COLIN NAJERA      FINDINGS:  Supine  views of the abdomen show a nasogastric tube overlying the  left upper abdomen with the side hole possibly in the distal  esophagus. Recommend advancing the nasogastric tube and repeating the  x-ray centered on the abdomen.      There is mild gaseous distention of small-bowel loops. Skin staples  overlie the lower abdomen. There are bilateral hip joint replacements  noted. Degenerative changes are seen in the spine.      Impression: 1.  Mild gaseous distention of small bowel loops  2. Nasogastric tube overlies the left upper abdomen with the side  hole possibly in the esophagus. Recommend advancing the nasogastric  tube approximately 8 cm and repeating the x-ray centered on the upper  abdomen/lung bases to better evaluate tube placement.      MACRO:  None      Signed by: Chreyle Jackson 4/27/2024 10:39 AM  Dictation workstation:   [id number]      Physical Exam  Constitutional:       General: He is not in acute distress.     Appearance: Normal appearance.   HENT:      Head: Normocephalic.      Mouth/Throat:      Mouth: Mucous membranes are moist.      Pharynx: Oropharynx is clear.   Eyes:      Pupils: Pupils are equal, round, and reactive to light.   Cardiovascular:      Rate and Rhythm: Normal rate and regular rhythm.      Heart sounds: Normal heart sounds. No murmur heard.     No gallop.   Pulmonary:      Effort: Pulmonary effort is normal.      Breath sounds: Normal breath sounds.   Abdominal:      General: Bowel sounds are normal. There is no distension.      Palpations: Abdomen is soft.      Tenderness: There is no abdominal tenderness. There is no guarding.   Musculoskeletal:         General: No swelling. Normal range of motion.      Cervical back: Neck supple. No rigidity.      Right lower leg: No edema.      Left lower leg: No edema.   Skin:     General: Skin is warm and dry.   Neurological:      General: No focal deficit present.      Mental Status: He is alert.      Cranial Nerves: No cranial nerve deficit.       Sensory: No sensory deficit.   Psychiatric:         Mood and Affect: Mood normal.         Behavior: Behavior normal.         Relevant Results             Scheduled medications  dilTIAZem CD, 120 mg, oral, Daily  [Held by provider] enoxaparin, 1 mg/kg, subcutaneous, BID  furosemide, 20 mg, oral, Daily  pantoprazole, 40 mg, oral, Daily before breakfast   Or  pantoprazole, 40 mg, intravenous, Daily before breakfast  pneumococcal conjugate, 0.5 mL, intramuscular, During hospitalization  [Held by provider] rivaroxaban, 20 mg, oral, Daily      Continuous medications       PRN medications  PRN medications: acetaminophen **OR** acetaminophen **OR** acetaminophen, benzocaine-menthol, hydrALAZINE, HYDROmorphone, ipratropium-albuteroL, ondansetron **OR** ondansetron, phenoL, surgicel hemostat 4 x 8  Results for orders placed or performed during the hospital encounter of 04/23/24 (from the past 96 hour(s))   Comprehensive Metabolic Panel   Result Value Ref Range    Glucose 120 (H) 74 - 99 mg/dL    Sodium 139 136 - 145 mmol/L    Potassium 3.5 3.5 - 5.3 mmol/L    Chloride 106 98 - 107 mmol/L    Bicarbonate 25 21 - 32 mmol/L    Anion Gap 12 10 - 20 mmol/L    Urea Nitrogen 14 6 - 23 mg/dL    Creatinine 0.96 0.50 - 1.30 mg/dL    eGFR 87 >60 mL/min/1.73m*2    Calcium 8.2 (L) 8.6 - 10.3 mg/dL    Albumin 3.2 (L) 3.4 - 5.0 g/dL    Alkaline Phosphatase 76 33 - 136 U/L    Total Protein 6.1 (L) 6.4 - 8.2 g/dL    AST 51 (H) 9 - 39 U/L    Bilirubin, Total 1.1 0.0 - 1.2 mg/dL    ALT 49 10 - 52 U/L   CBC and Auto Differential   Result Value Ref Range    WBC 6.3 4.4 - 11.3 x10*3/uL    nRBC 0.0 0.0 - 0.0 /100 WBCs    RBC 3.71 (L) 4.50 - 5.90 x10*6/uL    Hemoglobin 11.5 (L) 13.5 - 17.5 g/dL    Hematocrit 35.1 (L) 41.0 - 52.0 %    MCV 95 80 - 100 fL    MCH 31.0 26.0 - 34.0 pg    MCHC 32.8 32.0 - 36.0 g/dL    RDW 13.0 11.5 - 14.5 %    Platelets 309 150 - 450 x10*3/uL    Neutrophils % 72.3 40.0 - 80.0 %    Immature Granulocytes %, Automated 0.5  0.0 - 0.9 %    Lymphocytes % 14.2 13.0 - 44.0 %    Monocytes % 7.6 2.0 - 10.0 %    Eosinophils % 4.8 0.0 - 6.0 %    Basophils % 0.6 0.0 - 2.0 %    Neutrophils Absolute 4.54 1.20 - 7.70 x10*3/uL    Immature Granulocytes Absolute, Automated 0.03 0.00 - 0.70 x10*3/uL    Lymphocytes Absolute 0.89 (L) 1.20 - 4.80 x10*3/uL    Monocytes Absolute 0.48 0.10 - 1.00 x10*3/uL    Eosinophils Absolute 0.30 0.00 - 0.70 x10*3/uL    Basophils Absolute 0.04 0.00 - 0.10 x10*3/uL   Basic Metabolic Panel   Result Value Ref Range    Glucose 105 (H) 74 - 99 mg/dL    Sodium 134 (L) 136 - 145 mmol/L    Potassium 3.6 3.5 - 5.3 mmol/L    Chloride 102 98 - 107 mmol/L    Bicarbonate 26 21 - 32 mmol/L    Anion Gap 10 10 - 20 mmol/L    Urea Nitrogen 13 6 - 23 mg/dL    Creatinine 0.93 0.50 - 1.30 mg/dL    eGFR >90 >60 mL/min/1.73m*2    Calcium 8.1 (L) 8.6 - 10.3 mg/dL   CBC and Auto Differential   Result Value Ref Range    WBC 7.5 4.4 - 11.3 x10*3/uL    nRBC 0.0 0.0 - 0.0 /100 WBCs    RBC 2.57 (L) 4.50 - 5.90 x10*6/uL    Hemoglobin 8.0 (L) 13.5 - 17.5 g/dL    Hematocrit 24.3 (L) 41.0 - 52.0 %    MCV 95 80 - 100 fL    MCH 31.1 26.0 - 34.0 pg    MCHC 32.9 32.0 - 36.0 g/dL    RDW 13.2 11.5 - 14.5 %    Platelets 290 150 - 450 x10*3/uL    Neutrophils % 59.9 40.0 - 80.0 %    Immature Granulocytes %, Automated 3.2 (H) 0.0 - 0.9 %    Lymphocytes % 22.4 13.0 - 44.0 %    Monocytes % 8.4 2.0 - 10.0 %    Eosinophils % 5.2 0.0 - 6.0 %    Basophils % 0.9 0.0 - 2.0 %    Neutrophils Absolute 4.47 1.20 - 7.70 x10*3/uL    Immature Granulocytes Absolute, Automated 0.24 0.00 - 0.70 x10*3/uL    Lymphocytes Absolute 1.67 1.20 - 4.80 x10*3/uL    Monocytes Absolute 0.63 0.10 - 1.00 x10*3/uL    Eosinophils Absolute 0.39 0.00 - 0.70 x10*3/uL    Basophils Absolute 0.07 0.00 - 0.10 x10*3/uL   Magnesium   Result Value Ref Range    Magnesium 1.81 1.60 - 2.40 mg/dL   Hemoglobin and hematocrit, blood   Result Value Ref Range    Hemoglobin 8.7 (L) 13.5 - 17.5 g/dL    Hematocrit  25.3 (L) 41.0 - 52.0 %   CBC and Auto Differential   Result Value Ref Range    WBC 7.1 4.4 - 11.3 x10*3/uL    nRBC 0.0 0.0 - 0.0 /100 WBCs    RBC 2.32 (L) 4.50 - 5.90 x10*6/uL    Hemoglobin 7.2 (L) 13.5 - 17.5 g/dL    Hematocrit 22.2 (L) 41.0 - 52.0 %    MCV 96 80 - 100 fL    MCH 31.0 26.0 - 34.0 pg    MCHC 32.4 32.0 - 36.0 g/dL    RDW 13.4 11.5 - 14.5 %    Platelets 300 150 - 450 x10*3/uL    Neutrophils % 57.9 40.0 - 80.0 %    Immature Granulocytes %, Automated 4.9 (H) 0.0 - 0.9 %    Lymphocytes % 20.8 13.0 - 44.0 %    Monocytes % 9.1 2.0 - 10.0 %    Eosinophils % 6.0 0.0 - 6.0 %    Basophils % 1.3 0.0 - 2.0 %    Neutrophils Absolute 4.13 1.20 - 7.70 x10*3/uL    Immature Granulocytes Absolute, Automated 0.35 0.00 - 0.70 x10*3/uL    Lymphocytes Absolute 1.48 1.20 - 4.80 x10*3/uL    Monocytes Absolute 0.65 0.10 - 1.00 x10*3/uL    Eosinophils Absolute 0.43 0.00 - 0.70 x10*3/uL    Basophils Absolute 0.09 0.00 - 0.10 x10*3/uL   Hemoglobin and hematocrit, blood   Result Value Ref Range    Hemoglobin 7.9 (L) 13.5 - 17.5 g/dL    Hematocrit 24.5 (L) 41.0 - 52.0 %       Assessment/Plan            Principal Problem:    Infected hernioplasty mesh, initial encounter ([facility])  Active Problems:    Atrial fibrillation (Multi)    Benign essential hypertension    Chronic heart failure with preserved ejection fraction (Multi)    NA (obstructive sleep apnea)      Infected hernioplasty mesh  S/p removal of mesh and lap sb resection w post op ileus  Pt w apparently normalizing GI function, on soft diet now  Completed 7d course zosyn is afebrile  Concern for blood loss, Surgery will receh labs, possibly CT if continues to drop    Permanent Afib  Rate is controlled  Pt has been anticoagulated w Lovenox, currently on hold due to significant anemia, concern for bleeding.     Benign essential hypertension  Stable continue diltiazem    Chronic diastolic CHF  Appears compensated at this time      NA  Hs CPAP           There do not appear to  be any medical contraindications to safe discharge pending surgical opinion on post operative wound.          Michel Vargas, APRN-CNP

## 2024-05-06 ENCOUNTER — PATIENT OUTREACH (OUTPATIENT)
Dept: CARE COORDINATION | Facility: CLINIC | Age: 64
End: 2024-05-06
Payer: MEDICARE

## 2024-05-10 NOTE — DOCUMENTATION CLARIFICATION NOTE
"    PATIENT:               GERSON SUÁREZ  ACCT #:                  [id number]  MRN:                       [id number]  :                       1960  ADMIT DATE:       2024 11:29 AM  DISCH DATE:        2024 1:59 PM  RESPONDING PROVIDER #:        [id number]          PROVIDER RESPONSE TEXT:    I concur with the pathology report findings and they are clinically significant    CDI QUERY TEXT:    Clarification        Based on your assessment of the patient and the clinical information, please provide the requested documentation by clicking on the appropriate radio button and enter any additional information if prompted.    Question: Please document whether you concur or do not concur with the pathology report findings    When answering this query, please exercise your independent professional judgment. The fact that a question is being asked, does not imply that any particular answer is desired or expected.    The patient's clinical indicators include:  Clinical Information: 64 yr. old admitted with infected hernioplasty mesh s/p exploratory laparotomy small bowel resection with removal of mesh.    Clinical Indicators and Pathology Findings:  24 Path report: \"A:  Received in formalin, labeled with the patient's name and hospital number and \"small bowel resection\", is an un-oriented segment of small bowel, looped upon itself and attached to a segment of synthetic mesh.  The small bowel measures 36.5 cm in length by 2.47 cm in diameter.  There is attached mesentery and fibrofatty tissue.  The serosal surface is hyperemic.  Two staple lines are present at the margins.  The margins of the specimen are viable. The bowel wall measures 0.2 cm in greatest thickness and is diffusely ischemic.  The mucosal surface demonstrates normal to slightly dilated mucosal folds.  Mucosal polyps are not present. Upon sectioning of the attached mesentery, lymph nodes are not identified.  Multiple thrombi are present measuring 0.1 cm " "in greatest dimension.  Photographs have been taken.  Representative sections are submitted in 5 cassettes.  B: Received in formalin, labeled with the patient's name and hospital number and \"umbilicus\", is an ovoid segment of skin with underlying soft tissue measuring 3.9 x 3.2 x 1.9 cm.  On the skin surface is a show extending through the specimen measuring 1.7 cm in length.  The skin surface is also puckered.  The resection margin is inked.  The specimen is sectioned to reveal a necrotic, hemorrhagic appearing cut surface.  A representative section is submitted in one cassette.  DJO\"    Treatment: Surgery    Risk Factors: 1 week history of swelling and a throbbing pain to the umbilical region.  Options provided:  -- I concur with the pathology report findings and they are clinically significant  -- I do not concur with the pathology report findings  -- Other - I will add my own diagnosis  -- Refer to Clinical Documentation Reviewer    Query created by: Cheryle Vargas on 5/6/2024 11:39 AM      Electronically signed by:  RADHA ARMENDARIZ MD 5/10/2024 8:58 AM          "

## 2024-05-13 DIAGNOSIS — J43.8 OTHER EMPHYSEMA (MULTI): Primary | ICD-10-CM

## 2024-05-13 DIAGNOSIS — I50.32 CHRONIC HEART FAILURE WITH PRESERVED EJECTION FRACTION (MULTI): ICD-10-CM

## 2024-05-20 ENCOUNTER — TELEPHONE (OUTPATIENT)
Dept: PRIMARY CARE | Facility: CLINIC | Age: 64
End: 2024-05-20
Payer: MEDICARE

## 2024-05-20 DIAGNOSIS — I48.0 PAROXYSMAL ATRIAL FIBRILLATION (MULTI): ICD-10-CM

## 2024-05-20 DIAGNOSIS — E78.49 OTHER HYPERLIPIDEMIA: ICD-10-CM

## 2024-05-20 DIAGNOSIS — I10 BENIGN ESSENTIAL HYPERTENSION: ICD-10-CM

## 2024-05-20 DIAGNOSIS — E78.1 ESSENTIAL HYPERTRIGLYCERIDEMIA: ICD-10-CM

## 2024-05-20 DIAGNOSIS — Z12.5 SCREENING FOR PROSTATE CANCER: ICD-10-CM

## 2024-05-20 DIAGNOSIS — E55.9 VITAMIN D DEFICIENCY: ICD-10-CM

## 2024-05-20 NOTE — TELEPHONE ENCOUNTER
Patient asking that you change the date on his labs from July to the end of May so he can get labs done before his appointment on May 30th

## 2024-05-21 ENCOUNTER — LAB (OUTPATIENT)
Dept: LAB | Facility: LAB | Age: 64
End: 2024-05-21
Payer: MEDICARE

## 2024-05-21 DIAGNOSIS — Z12.5 SCREENING FOR PROSTATE CANCER: ICD-10-CM

## 2024-05-21 DIAGNOSIS — E55.9 VITAMIN D DEFICIENCY: ICD-10-CM

## 2024-05-21 DIAGNOSIS — E78.1 ESSENTIAL HYPERTRIGLYCERIDEMIA: ICD-10-CM

## 2024-05-21 DIAGNOSIS — I10 BENIGN ESSENTIAL HYPERTENSION: ICD-10-CM

## 2024-05-21 DIAGNOSIS — E78.49 OTHER HYPERLIPIDEMIA: ICD-10-CM

## 2024-05-21 DIAGNOSIS — I48.0 PAROXYSMAL ATRIAL FIBRILLATION (MULTI): ICD-10-CM

## 2024-05-21 LAB
25(OH)D3 SERPL-MCNC: 41 NG/ML (ref 30–100)
ALBUMIN SERPL BCP-MCNC: 3.8 G/DL (ref 3.4–5)
ALP SERPL-CCNC: 96 U/L (ref 33–136)
ALT SERPL W P-5'-P-CCNC: 35 U/L (ref 10–52)
ANION GAP SERPL CALC-SCNC: 9 MMOL/L (ref 10–20)
AST SERPL W P-5'-P-CCNC: 29 U/L (ref 9–39)
BILIRUB SERPL-MCNC: 0.5 MG/DL (ref 0–1.2)
BUN SERPL-MCNC: 17 MG/DL (ref 6–23)
CALCIUM SERPL-MCNC: 8.8 MG/DL (ref 8.6–10.3)
CHLORIDE SERPL-SCNC: 104 MMOL/L (ref 98–107)
CHOLEST SERPL-MCNC: 137 MG/DL (ref 0–199)
CHOLESTEROL/HDL RATIO: 2.8
CO2 SERPL-SCNC: 30 MMOL/L (ref 21–32)
CREAT SERPL-MCNC: 1.03 MG/DL (ref 0.5–1.3)
EGFRCR SERPLBLD CKD-EPI 2021: 80 ML/MIN/1.73M*2
ERYTHROCYTE [DISTWIDTH] IN BLOOD BY AUTOMATED COUNT: 13.9 % (ref 11.5–14.5)
GLUCOSE SERPL-MCNC: 100 MG/DL (ref 74–99)
HCT VFR BLD AUTO: 36.1 % (ref 41–52)
HDLC SERPL-MCNC: 48.2 MG/DL
HGB BLD-MCNC: 11.6 G/DL (ref 13.5–17.5)
LDLC SERPL CALC-MCNC: 65 MG/DL
LDLC SERPL DIRECT ASSAY-MCNC: 58 MG/DL (ref 0–129)
MCH RBC QN AUTO: 30.5 PG (ref 26–34)
MCHC RBC AUTO-ENTMCNC: 32.1 G/DL (ref 32–36)
MCV RBC AUTO: 95 FL (ref 80–100)
NON HDL CHOLESTEROL: 89 MG/DL (ref 0–149)
NRBC BLD-RTO: 0 /100 WBCS (ref 0–0)
PLATELET # BLD AUTO: 405 X10*3/UL (ref 150–450)
POTASSIUM SERPL-SCNC: 4.7 MMOL/L (ref 3.5–5.3)
PROT SERPL-MCNC: 6.7 G/DL (ref 6.4–8.2)
PSA SERPL-MCNC: 0.68 NG/ML
RBC # BLD AUTO: 3.8 X10*6/UL (ref 4.5–5.9)
SODIUM SERPL-SCNC: 138 MMOL/L (ref 136–145)
TRIGL SERPL-MCNC: 117 MG/DL (ref 0–149)
VLDL: 23 MG/DL (ref 0–40)
WBC # BLD AUTO: 5.6 X10*3/UL (ref 4.4–11.3)

## 2024-05-21 PROCEDURE — G0103 PSA SCREENING: HCPCS

## 2024-05-21 PROCEDURE — 83721 ASSAY OF BLOOD LIPOPROTEIN: CPT

## 2024-05-21 PROCEDURE — 80061 LIPID PANEL: CPT

## 2024-05-21 PROCEDURE — 82306 VITAMIN D 25 HYDROXY: CPT

## 2024-05-21 PROCEDURE — 36415 COLL VENOUS BLD VENIPUNCTURE: CPT

## 2024-05-21 PROCEDURE — 80053 COMPREHEN METABOLIC PANEL: CPT

## 2024-05-21 PROCEDURE — 85027 COMPLETE CBC AUTOMATED: CPT

## 2024-05-23 ENCOUNTER — OFFICE VISIT (OUTPATIENT)
Dept: PRIMARY CARE | Facility: CLINIC | Age: 64
End: 2024-05-23
Payer: MEDICARE

## 2024-05-23 ENCOUNTER — HOSPITAL ENCOUNTER (OUTPATIENT)
Dept: RADIOLOGY | Facility: CLINIC | Age: 64
Discharge: HOME | End: 2024-05-23
Payer: MEDICARE

## 2024-05-23 VITALS
BODY MASS INDEX: 37.19 KG/M2 | OXYGEN SATURATION: 95 % | WEIGHT: 315 LBS | RESPIRATION RATE: 14 BRPM | HEART RATE: 74 BPM | HEIGHT: 77 IN | SYSTOLIC BLOOD PRESSURE: 126 MMHG | DIASTOLIC BLOOD PRESSURE: 76 MMHG | TEMPERATURE: 97.6 F

## 2024-05-23 DIAGNOSIS — R73.01 IMPAIRED FASTING GLUCOSE: ICD-10-CM

## 2024-05-23 DIAGNOSIS — I48.0 PAROXYSMAL A-FIB (MULTI): ICD-10-CM

## 2024-05-23 DIAGNOSIS — R07.89 ATYPICAL CHEST PAIN: Primary | ICD-10-CM

## 2024-05-23 DIAGNOSIS — D62 ACUTE BLOOD LOSS ANEMIA: ICD-10-CM

## 2024-05-23 DIAGNOSIS — R07.89 ATYPICAL CHEST PAIN: ICD-10-CM

## 2024-05-23 DIAGNOSIS — I10 BENIGN ESSENTIAL HYPERTENSION: ICD-10-CM

## 2024-05-23 PROCEDURE — 1111F DSCHRG MED/CURRENT MED MERGE: CPT | Performed by: FAMILY MEDICINE

## 2024-05-23 PROCEDURE — 3074F SYST BP LT 130 MM HG: CPT | Performed by: FAMILY MEDICINE

## 2024-05-23 PROCEDURE — 1123F ACP DISCUSS/DSCN MKR DOCD: CPT | Performed by: FAMILY MEDICINE

## 2024-05-23 PROCEDURE — 71046 X-RAY EXAM CHEST 2 VIEWS: CPT

## 2024-05-23 PROCEDURE — 1036F TOBACCO NON-USER: CPT | Performed by: FAMILY MEDICINE

## 2024-05-23 PROCEDURE — 1160F RVW MEDS BY RX/DR IN RCRD: CPT | Performed by: FAMILY MEDICINE

## 2024-05-23 PROCEDURE — 1159F MED LIST DOCD IN RCRD: CPT | Performed by: FAMILY MEDICINE

## 2024-05-23 PROCEDURE — 93000 ELECTROCARDIOGRAM COMPLETE: CPT | Performed by: FAMILY MEDICINE

## 2024-05-23 PROCEDURE — 3008F BODY MASS INDEX DOCD: CPT | Performed by: FAMILY MEDICINE

## 2024-05-23 PROCEDURE — 3078F DIAST BP <80 MM HG: CPT | Performed by: FAMILY MEDICINE

## 2024-05-23 PROCEDURE — 71046 X-RAY EXAM CHEST 2 VIEWS: CPT | Performed by: RADIOLOGY

## 2024-05-23 PROCEDURE — 99214 OFFICE O/P EST MOD 30 MIN: CPT | Performed by: FAMILY MEDICINE

## 2024-05-23 ASSESSMENT — ENCOUNTER SYMPTOMS
WOUND: 1
PALPITATIONS: 0
FEVER: 0
CHILLS: 0
CHEST TIGHTNESS: 0
CONFUSION: 0
SHORTNESS OF BREATH: 0
ABDOMINAL PAIN: 0
ARTHRALGIAS: 0

## 2024-05-23 NOTE — ASSESSMENT & PLAN NOTE
Patient anticoagulated on Xarelto.  Is also on diltiazem for rate control.  Follows with cardiology.  He states he is can to discuss with them if he has any options to possibly get out of A-fib completely.

## 2024-05-23 NOTE — PROGRESS NOTES
"Subjective   Patient ID: Rambo Daigle is a 64 y.o. male who presents for Follow-up (Post hospitalization labs ).    HPI today for follow-up he had to have emergency surgery because of umbilical hernia issues subsequent underwent surgery for repair wound is still healing and is partially open.  Has follow-up with the surgeon again next week.  Prior to leaving the hospital during his stay they noticed that his hemoglobin dropped down into the sevens.  When he left he was around 7.2-7.9.  Wanted to get that relooked at.  Also he states that last Saturday while he was at a party he was over eating had alcohol also had been constipated he said he developed some chest pain family members a  took him down to the station did an EKG and checked his vitals and said everything was stable patient never went to the ER he says shortly thereafter the symptoms resolved he is felt fine since but he thought he should mention it.    Review of Systems   Constitutional:  Negative for chills and fever.   HENT:  Negative for congestion and ear pain.    Eyes:  Negative for visual disturbance.   Respiratory:  Negative for chest tightness and shortness of breath.    Cardiovascular:  Positive for chest pain. Negative for palpitations.   Gastrointestinal:  Negative for abdominal pain.   Musculoskeletal:  Negative for arthralgias.   Skin:  Positive for wound. Negative for pallor.   Psychiatric/Behavioral:  Negative for confusion.        Objective   /76   Pulse 74   Temp 36.4 °C (97.6 °F)   Resp 14   Ht 1.956 m (6' 5\")   Wt 147 kg (324 lb)   SpO2 95%   BMI 38.42 kg/m²     Physical Exam  Vitals and nursing note reviewed.   Constitutional:       General: He is not in acute distress.     Appearance: Normal appearance. He is not ill-appearing.   HENT:      Head: Normocephalic and atraumatic.      Right Ear: Tympanic membrane, ear canal and external ear normal.      Left Ear: Tympanic membrane, ear canal and external ear normal. "      Mouth/Throat:      Pharynx: Oropharynx is clear.   Eyes:      Extraocular Movements: Extraocular movements intact.   Cardiovascular:      Rate and Rhythm: Normal rate and regular rhythm.      Pulses: Normal pulses.      Heart sounds: Normal heart sounds.   Pulmonary:      Effort: Pulmonary effort is normal.      Breath sounds: Normal breath sounds.   Abdominal:      General: Bowel sounds are normal.      Palpations: Abdomen is soft.      Tenderness: There is no abdominal tenderness.      Comments: Healing midline incision distal and is slightly open with some serosanguineous drainage but no obvious signs of infection no unusual erythema no unusual odor.   Musculoskeletal:         General: Normal range of motion.      Cervical back: Neck supple.   Skin:     General: Skin is warm.   Neurological:      Mental Status: He is alert and oriented to person, place, and time. Mental status is at baseline.   Psychiatric:         Mood and Affect: Mood normal.       Recent Results (from the past 1008 hour(s))   CBC and Auto Differential    Collection Time: 04/23/24 12:38 PM   Result Value Ref Range    WBC 7.3 4.4 - 11.3 x10*3/uL    nRBC 0.0 0.0 - 0.0 /100 WBCs    RBC 4.63 4.50 - 5.90 x10*6/uL    Hemoglobin 14.4 13.5 - 17.5 g/dL    Hematocrit 44.1 41.0 - 52.0 %    MCV 95 80 - 100 fL    MCH 31.1 26.0 - 34.0 pg    MCHC 32.7 32.0 - 36.0 g/dL    RDW 13.2 11.5 - 14.5 %    Platelets 318 150 - 450 x10*3/uL    Neutrophils % 69.8 40.0 - 80.0 %    Immature Granulocytes %, Automated 0.5 0.0 - 0.9 %    Lymphocytes % 18.5 13.0 - 44.0 %    Monocytes % 8.9 2.0 - 10.0 %    Eosinophils % 1.5 0.0 - 6.0 %    Basophils % 0.8 0.0 - 2.0 %    Neutrophils Absolute 5.09 1.20 - 7.70 x10*3/uL    Immature Granulocytes Absolute, Automated 0.04 0.00 - 0.70 x10*3/uL    Lymphocytes Absolute 1.35 1.20 - 4.80 x10*3/uL    Monocytes Absolute 0.65 0.10 - 1.00 x10*3/uL    Eosinophils Absolute 0.11 0.00 - 0.70 x10*3/uL    Basophils Absolute 0.06 0.00 - 0.10  x10*3/uL   Basic metabolic panel    Collection Time: 04/23/24 12:38 PM   Result Value Ref Range    Glucose 97 74 - 99 mg/dL    Sodium 137 136 - 145 mmol/L    Potassium 4.6 3.5 - 5.3 mmol/L    Chloride 101 98 - 107 mmol/L    Bicarbonate 30 21 - 32 mmol/L    Anion Gap 11 10 - 20 mmol/L    Urea Nitrogen 17 6 - 23 mg/dL    Creatinine 1.05 0.50 - 1.30 mg/dL    eGFR 78 >60 mL/min/1.73m*2    Calcium 9.5 8.6 - 10.3 mg/dL   Lactate    Collection Time: 04/23/24 12:38 PM   Result Value Ref Range    Lactate 0.8 0.4 - 2.0 mmol/L   CBC    Collection Time: 04/23/24 10:08 PM   Result Value Ref Range    WBC 7.6 4.4 - 11.3 x10*3/uL    nRBC 0.0 0.0 - 0.0 /100 WBCs    RBC 4.22 (L) 4.50 - 5.90 x10*6/uL    Hemoglobin 13.4 (L) 13.5 - 17.5 g/dL    Hematocrit 39.5 (L) 41.0 - 52.0 %    MCV 94 80 - 100 fL    MCH 31.8 26.0 - 34.0 pg    MCHC 33.9 32.0 - 36.0 g/dL    RDW 13.1 11.5 - 14.5 %    Platelets 274 150 - 450 x10*3/uL   Magnesium    Collection Time: 04/23/24 10:08 PM   Result Value Ref Range    Magnesium 1.93 1.60 - 2.40 mg/dL   Vancomycin    Collection Time: 04/25/24  5:10 AM   Result Value Ref Range    Vancomycin 20.0 5.0 - 20.0 ug/mL   CBC    Collection Time: 04/25/24  5:10 AM   Result Value Ref Range    WBC 5.4 4.4 - 11.3 x10*3/uL    nRBC 0.0 0.0 - 0.0 /100 WBCs    RBC 3.98 (L) 4.50 - 5.90 x10*6/uL    Hemoglobin 12.6 (L) 13.5 - 17.5 g/dL    Hematocrit 37.4 (L) 41.0 - 52.0 %    MCV 94 80 - 100 fL    MCH 31.7 26.0 - 34.0 pg    MCHC 33.7 32.0 - 36.0 g/dL    RDW 13.1 11.5 - 14.5 %    Platelets 241 150 - 450 x10*3/uL   Basic metabolic panel    Collection Time: 04/25/24  5:10 AM   Result Value Ref Range    Glucose 104 (H) 74 - 99 mg/dL    Sodium 139 136 - 145 mmol/L    Potassium 3.8 3.5 - 5.3 mmol/L    Chloride 105 98 - 107 mmol/L    Bicarbonate 27 21 - 32 mmol/L    Anion Gap 11 10 - 20 mmol/L    Urea Nitrogen 11 6 - 23 mg/dL    Creatinine 1.05 0.50 - 1.30 mg/dL    eGFR 78 >60 mL/min/1.73m*2    Calcium 8.7 8.6 - 10.3 mg/dL   Magnesium     "Collection Time: 04/25/24  5:10 AM   Result Value Ref Range    Magnesium 1.94 1.60 - 2.40 mg/dL   Phosphorus    Collection Time: 04/25/24  5:10 AM   Result Value Ref Range    Phosphorus 3.5 2.5 - 4.9 mg/dL   Surgical Pathology Exam    Collection Time: 04/25/24  2:41 PM   Result Value Ref Range    Case Report       Surgical Pathology                                Case: [id number]                                  Authorizing Provider:  Ty Torres MD            Collected:           04/25/2024 1441              Ordering Location:     [facility]  Received:            04/26/2024 0702                                     OR                                                                           Pathologist:           Shelbie Daly MD PhD                                                          Specimens:   A) - SMALL BOWEL /INTESTINE SEGMENTAL RESECTION, SMALL BOWEL RESECTION WITH MESH                    B) - UMBILICUS RESECTION, UMBILICUS                                                        FINAL DIAGNOSIS       A. SMALL BOWEL /INTESTINE SEGMENTAL RESECTION:   -Small bowel with serosal synthetic material and acute inflammation  -Mesentery with congested vasculature    B. UMBILICUS RESECTION:   -Squamous epithelium and dermal tissue with acute and chronic inflammation              By the signature on this report, the individual or group listed as making the Final Interpretation/Diagnosis certifies that they have reviewed this case.       Clinical History       Pre-op diagnosis:  Infected hernioplasty mesh, initial encounter (CMS-[facility]) [T85.79XA]      Gross Description       A:  Received in formalin, labeled with the patient's name and hospital number and \"small bowel resection\", is an un-oriented segment of small bowel, looped upon itself and attached to a segment of synthetic mesh.  The small bowel measures 36.5 cm in length by 2.47 cm in diameter.  There is attached mesentery and fibrofatty tissue.  " "The serosal surface is hyperemic.  Two staple lines are present at the margins.  The margins of the specimen are viable. The bowel wall measures 0.2 cm in greatest thickness and is diffusely ischemic.  The mucosal surface demonstrates normal to slightly dilated mucosal folds.  Mucosal polyps are not present. Upon sectioning of the attached mesentery, lymph nodes are not identified.  Multiple thrombi are present measuring 0.1 cm in greatest dimension.  Photographs have been taken.  Representative sections are submitted in 5 cassettes.  [facility]    Summary of Cassettes:  Specimen Label Site  A  1-2 resection margins    3 bowel looped upon itself at attachment of synthetic mesh    4 representative      5 mesentery with thrombi  B: Received in formalin, labeled with the patient's name and hospital number and \"umbilicus\", is an ovoid segment of skin with underlying soft tissue measuring 3.9 x 3.2 x 1.9 cm.  On the skin surface is a show extending through the specimen measuring 1.7 cm in length.  The skin surface is also puckered.  The resection margin is inked.  The specimen is sectioned to reveal a necrotic, hemorrhagic appearing cut surface.  A representative section is submitted in one cassette.    [facility]       CBC    Collection Time: 04/26/24  4:30 AM   Result Value Ref Range    WBC 8.2 4.4 - 11.3 x10*3/uL    nRBC 0.0 0.0 - 0.0 /100 WBCs    RBC 4.00 (L) 4.50 - 5.90 x10*6/uL    Hemoglobin 12.4 (L) 13.5 - 17.5 g/dL    Hematocrit 38.5 (L) 41.0 - 52.0 %    MCV 96 80 - 100 fL    MCH 31.0 26.0 - 34.0 pg    MCHC 32.2 32.0 - 36.0 g/dL    RDW 13.2 11.5 - 14.5 %    Platelets 265 150 - 450 x10*3/uL   Basic metabolic panel    Collection Time: 04/26/24  4:30 AM   Result Value Ref Range    Glucose 101 (H) 74 - 99 mg/dL    Sodium 140 136 - 145 mmol/L    Potassium 3.4 (L) 3.5 - 5.3 mmol/L    Chloride 108 (H) 98 - 107 mmol/L    Bicarbonate 25 21 - 32 mmol/L    Anion Gap 10 mmol/L    Urea Nitrogen 8 6 - 23 mg/dL    Creatinine 0.91 0.50 - 1.30 " mg/dL    eGFR >90 >60 mL/min/1.73m*2    Calcium 7.6 (L) 8.6 - 10.3 mg/dL   Magnesium    Collection Time: 04/26/24  4:30 AM   Result Value Ref Range    Magnesium 1.67 1.60 - 2.40 mg/dL   Phosphorus    Collection Time: 04/26/24  4:30 AM   Result Value Ref Range    Phosphorus 2.9 2.5 - 4.9 mg/dL   CBC    Collection Time: 04/27/24  4:42 AM   Result Value Ref Range    WBC 10.1 4.4 - 11.3 x10*3/uL    nRBC 0.0 0.0 - 0.0 /100 WBCs    RBC 4.64 4.50 - 5.90 x10*6/uL    Hemoglobin 14.7 13.5 - 17.5 g/dL    Hematocrit 43.8 41.0 - 52.0 %    MCV 94 80 - 100 fL    MCH 31.7 26.0 - 34.0 pg    MCHC 33.6 32.0 - 36.0 g/dL    RDW 13.2 11.5 - 14.5 %    Platelets 315 150 - 450 x10*3/uL   Basic metabolic panel    Collection Time: 04/27/24  4:42 AM   Result Value Ref Range    Glucose 108 (H) 74 - 99 mg/dL    Sodium 135 (L) 136 - 145 mmol/L    Potassium 5.1 3.5 - 5.3 mmol/L    Chloride 102 98 - 107 mmol/L    Bicarbonate 21 21 - 32 mmol/L    Anion Gap 17 10 - 20 mmol/L    Urea Nitrogen 9 6 - 23 mg/dL    Creatinine 1.06 0.50 - 1.30 mg/dL    eGFR 77 >60 mL/min/1.73m*2    Calcium 9.3 8.6 - 10.3 mg/dL   Magnesium    Collection Time: 04/27/24  4:42 AM   Result Value Ref Range    Magnesium 2.17 1.60 - 2.40 mg/dL   Phosphorus    Collection Time: 04/27/24  4:42 AM   Result Value Ref Range    Phosphorus 2.4 (L) 2.5 - 4.9 mg/dL   CBC    Collection Time: 04/28/24  6:47 AM   Result Value Ref Range    WBC 8.6 4.4 - 11.3 x10*3/uL    nRBC 0.0 0.0 - 0.0 /100 WBCs    RBC 4.33 (L) 4.50 - 5.90 x10*6/uL    Hemoglobin 13.7 13.5 - 17.5 g/dL    Hematocrit 41.4 41.0 - 52.0 %    MCV 96 80 - 100 fL    MCH 31.6 26.0 - 34.0 pg    MCHC 33.1 32.0 - 36.0 g/dL    RDW 13.3 11.5 - 14.5 %    Platelets 326 150 - 450 x10*3/uL   Basic Metabolic Panel    Collection Time: 04/28/24  6:47 AM   Result Value Ref Range    Glucose 110 (H) 74 - 99 mg/dL    Sodium 139 136 - 145 mmol/L    Potassium 3.8 3.5 - 5.3 mmol/L    Chloride 103 98 - 107 mmol/L    Bicarbonate 26 21 - 32 mmol/L    Anion  Gap 14 10 - 20 mmol/L    Urea Nitrogen 12 6 - 23 mg/dL    Creatinine 1.01 0.50 - 1.30 mg/dL    eGFR 82 >60 mL/min/1.73m*2    Calcium 8.8 8.6 - 10.3 mg/dL   Magnesium    Collection Time: 04/28/24  6:47 AM   Result Value Ref Range    Magnesium 2.13 1.60 - 2.40 mg/dL   Phosphorus    Collection Time: 04/28/24  6:47 AM   Result Value Ref Range    Phosphorus 2.6 2.5 - 4.9 mg/dL   CBC    Collection Time: 04/29/24  5:10 AM   Result Value Ref Range    WBC 7.6 4.4 - 11.3 x10*3/uL    nRBC 0.0 0.0 - 0.0 /100 WBCs    RBC 4.12 (L) 4.50 - 5.90 x10*6/uL    Hemoglobin 12.9 (L) 13.5 - 17.5 g/dL    Hematocrit 39.7 (L) 41.0 - 52.0 %    MCV 96 80 - 100 fL    MCH 31.3 26.0 - 34.0 pg    MCHC 32.5 32.0 - 36.0 g/dL    RDW 13.2 11.5 - 14.5 %    Platelets 302 150 - 450 x10*3/uL   Basic metabolic panel    Collection Time: 04/29/24  5:10 AM   Result Value Ref Range    Glucose 105 (H) 74 - 99 mg/dL    Sodium 139 136 - 145 mmol/L    Potassium 3.6 3.5 - 5.3 mmol/L    Chloride 103 98 - 107 mmol/L    Bicarbonate 28 21 - 32 mmol/L    Anion Gap 12 10 - 20 mmol/L    Urea Nitrogen 14 6 - 23 mg/dL    Creatinine 1.01 0.50 - 1.30 mg/dL    eGFR 82 >60 mL/min/1.73m*2    Calcium 8.4 (L) 8.6 - 10.3 mg/dL   Magnesium    Collection Time: 04/29/24  5:10 AM   Result Value Ref Range    Magnesium 2.06 1.60 - 2.40 mg/dL   Phosphorus    Collection Time: 04/29/24  5:10 AM   Result Value Ref Range    Phosphorus 3.0 2.5 - 4.9 mg/dL   Basic Metabolic Panel    Collection Time: 04/30/24  6:52 AM   Result Value Ref Range    Glucose 114 (H) 74 - 99 mg/dL    Sodium 139 136 - 145 mmol/L    Potassium 3.7 3.5 - 5.3 mmol/L    Chloride 105 98 - 107 mmol/L    Bicarbonate 26 21 - 32 mmol/L    Anion Gap 12 10 - 20 mmol/L    Urea Nitrogen 15 6 - 23 mg/dL    Creatinine 0.99 0.50 - 1.30 mg/dL    eGFR 84 >60 mL/min/1.73m*2    Calcium 8.7 8.6 - 10.3 mg/dL   CBC and Auto Differential    Collection Time: 04/30/24  6:52 AM   Result Value Ref Range    WBC 6.3 4.4 - 11.3 x10*3/uL    nRBC 0.0  0.0 - 0.0 /100 WBCs    RBC 4.32 (L) 4.50 - 5.90 x10*6/uL    Hemoglobin 13.9 13.5 - 17.5 g/dL    Hematocrit 40.7 (L) 41.0 - 52.0 %    MCV 94 80 - 100 fL    MCH 32.2 26.0 - 34.0 pg    MCHC 34.2 32.0 - 36.0 g/dL    RDW 12.9 11.5 - 14.5 %    Platelets 327 150 - 450 x10*3/uL    Neutrophils % 67.0 40.0 - 80.0 %    Immature Granulocytes %, Automated 0.8 0.0 - 0.9 %    Lymphocytes % 16.9 13.0 - 44.0 %    Monocytes % 9.0 2.0 - 10.0 %    Eosinophils % 5.4 0.0 - 6.0 %    Basophils % 0.9 0.0 - 2.0 %    Neutrophils Absolute 4.25 1.20 - 7.70 x10*3/uL    Immature Granulocytes Absolute, Automated 0.05 0.00 - 0.70 x10*3/uL    Lymphocytes Absolute 1.07 (L) 1.20 - 4.80 x10*3/uL    Monocytes Absolute 0.57 0.10 - 1.00 x10*3/uL    Eosinophils Absolute 0.34 0.00 - 0.70 x10*3/uL    Basophils Absolute 0.06 0.00 - 0.10 x10*3/uL   Magnesium    Collection Time: 04/30/24  6:52 AM   Result Value Ref Range    Magnesium 2.00 1.60 - 2.40 mg/dL   Comprehensive Metabolic Panel    Collection Time: 05/01/24  5:02 AM   Result Value Ref Range    Glucose 120 (H) 74 - 99 mg/dL    Sodium 139 136 - 145 mmol/L    Potassium 3.5 3.5 - 5.3 mmol/L    Chloride 106 98 - 107 mmol/L    Bicarbonate 25 21 - 32 mmol/L    Anion Gap 12 10 - 20 mmol/L    Urea Nitrogen 14 6 - 23 mg/dL    Creatinine 0.96 0.50 - 1.30 mg/dL    eGFR 87 >60 mL/min/1.73m*2    Calcium 8.2 (L) 8.6 - 10.3 mg/dL    Albumin 3.2 (L) 3.4 - 5.0 g/dL    Alkaline Phosphatase 76 33 - 136 U/L    Total Protein 6.1 (L) 6.4 - 8.2 g/dL    AST 51 (H) 9 - 39 U/L    Bilirubin, Total 1.1 0.0 - 1.2 mg/dL    ALT 49 10 - 52 U/L   CBC and Auto Differential    Collection Time: 05/01/24  5:02 AM   Result Value Ref Range    WBC 6.3 4.4 - 11.3 x10*3/uL    nRBC 0.0 0.0 - 0.0 /100 WBCs    RBC 3.71 (L) 4.50 - 5.90 x10*6/uL    Hemoglobin 11.5 (L) 13.5 - 17.5 g/dL    Hematocrit 35.1 (L) 41.0 - 52.0 %    MCV 95 80 - 100 fL    MCH 31.0 26.0 - 34.0 pg    MCHC 32.8 32.0 - 36.0 g/dL    RDW 13.0 11.5 - 14.5 %    Platelets 309 150 -  450 x10*3/uL    Neutrophils % 72.3 40.0 - 80.0 %    Immature Granulocytes %, Automated 0.5 0.0 - 0.9 %    Lymphocytes % 14.2 13.0 - 44.0 %    Monocytes % 7.6 2.0 - 10.0 %    Eosinophils % 4.8 0.0 - 6.0 %    Basophils % 0.6 0.0 - 2.0 %    Neutrophils Absolute 4.54 1.20 - 7.70 x10*3/uL    Immature Granulocytes Absolute, Automated 0.03 0.00 - 0.70 x10*3/uL    Lymphocytes Absolute 0.89 (L) 1.20 - 4.80 x10*3/uL    Monocytes Absolute 0.48 0.10 - 1.00 x10*3/uL    Eosinophils Absolute 0.30 0.00 - 0.70 x10*3/uL    Basophils Absolute 0.04 0.00 - 0.10 x10*3/uL   Basic Metabolic Panel    Collection Time: 05/03/24  5:18 AM   Result Value Ref Range    Glucose 105 (H) 74 - 99 mg/dL    Sodium 134 (L) 136 - 145 mmol/L    Potassium 3.6 3.5 - 5.3 mmol/L    Chloride 102 98 - 107 mmol/L    Bicarbonate 26 21 - 32 mmol/L    Anion Gap 10 10 - 20 mmol/L    Urea Nitrogen 13 6 - 23 mg/dL    Creatinine 0.93 0.50 - 1.30 mg/dL    eGFR >90 >60 mL/min/1.73m*2    Calcium 8.1 (L) 8.6 - 10.3 mg/dL   CBC and Auto Differential    Collection Time: 05/03/24  5:18 AM   Result Value Ref Range    WBC 7.5 4.4 - 11.3 x10*3/uL    nRBC 0.0 0.0 - 0.0 /100 WBCs    RBC 2.57 (L) 4.50 - 5.90 x10*6/uL    Hemoglobin 8.0 (L) 13.5 - 17.5 g/dL    Hematocrit 24.3 (L) 41.0 - 52.0 %    MCV 95 80 - 100 fL    MCH 31.1 26.0 - 34.0 pg    MCHC 32.9 32.0 - 36.0 g/dL    RDW 13.2 11.5 - 14.5 %    Platelets 290 150 - 450 x10*3/uL    Neutrophils % 59.9 40.0 - 80.0 %    Immature Granulocytes %, Automated 3.2 (H) 0.0 - 0.9 %    Lymphocytes % 22.4 13.0 - 44.0 %    Monocytes % 8.4 2.0 - 10.0 %    Eosinophils % 5.2 0.0 - 6.0 %    Basophils % 0.9 0.0 - 2.0 %    Neutrophils Absolute 4.47 1.20 - 7.70 x10*3/uL    Immature Granulocytes Absolute, Automated 0.24 0.00 - 0.70 x10*3/uL    Lymphocytes Absolute 1.67 1.20 - 4.80 x10*3/uL    Monocytes Absolute 0.63 0.10 - 1.00 x10*3/uL    Eosinophils Absolute 0.39 0.00 - 0.70 x10*3/uL    Basophils Absolute 0.07 0.00 - 0.10 x10*3/uL   Magnesium     Collection Time: 05/03/24  5:18 AM   Result Value Ref Range    Magnesium 1.81 1.60 - 2.40 mg/dL   Hemoglobin and hematocrit, blood    Collection Time: 05/03/24 10:51 AM   Result Value Ref Range    Hemoglobin 8.7 (L) 13.5 - 17.5 g/dL    Hematocrit 25.3 (L) 41.0 - 52.0 %   CBC and Auto Differential    Collection Time: 05/04/24  5:49 AM   Result Value Ref Range    WBC 7.1 4.4 - 11.3 x10*3/uL    nRBC 0.0 0.0 - 0.0 /100 WBCs    RBC 2.32 (L) 4.50 - 5.90 x10*6/uL    Hemoglobin 7.2 (L) 13.5 - 17.5 g/dL    Hematocrit 22.2 (L) 41.0 - 52.0 %    MCV 96 80 - 100 fL    MCH 31.0 26.0 - 34.0 pg    MCHC 32.4 32.0 - 36.0 g/dL    RDW 13.4 11.5 - 14.5 %    Platelets 300 150 - 450 x10*3/uL    Neutrophils % 57.9 40.0 - 80.0 %    Immature Granulocytes %, Automated 4.9 (H) 0.0 - 0.9 %    Lymphocytes % 20.8 13.0 - 44.0 %    Monocytes % 9.1 2.0 - 10.0 %    Eosinophils % 6.0 0.0 - 6.0 %    Basophils % 1.3 0.0 - 2.0 %    Neutrophils Absolute 4.13 1.20 - 7.70 x10*3/uL    Immature Granulocytes Absolute, Automated 0.35 0.00 - 0.70 x10*3/uL    Lymphocytes Absolute 1.48 1.20 - 4.80 x10*3/uL    Monocytes Absolute 0.65 0.10 - 1.00 x10*3/uL    Eosinophils Absolute 0.43 0.00 - 0.70 x10*3/uL    Basophils Absolute 0.09 0.00 - 0.10 x10*3/uL   Hemoglobin and hematocrit, blood    Collection Time: 05/04/24 10:10 AM   Result Value Ref Range    Hemoglobin 7.9 (L) 13.5 - 17.5 g/dL    Hematocrit 24.5 (L) 41.0 - 52.0 %   Lipid Panel    Collection Time: 05/21/24  7:50 AM   Result Value Ref Range    Cholesterol 137 0 - 199 mg/dL    HDL-Cholesterol 48.2 mg/dL    Cholesterol/HDL Ratio 2.8     LDL Calculated 65 <=99 mg/dL    VLDL 23 0 - 40 mg/dL    Triglycerides 117 0 - 149 mg/dL    Non HDL Cholesterol 89 0 - 149 mg/dL   Comprehensive Metabolic Panel    Collection Time: 05/21/24  7:50 AM   Result Value Ref Range    Glucose 100 (H) 74 - 99 mg/dL    Sodium 138 136 - 145 mmol/L    Potassium 4.7 3.5 - 5.3 mmol/L    Chloride 104 98 - 107 mmol/L    Bicarbonate 30 21 - 32  mmol/L    Anion Gap 9 (L) 10 - 20 mmol/L    Urea Nitrogen 17 6 - 23 mg/dL    Creatinine 1.03 0.50 - 1.30 mg/dL    eGFR 80 >60 mL/min/1.73m*2    Calcium 8.8 8.6 - 10.3 mg/dL    Albumin 3.8 3.4 - 5.0 g/dL    Alkaline Phosphatase 96 33 - 136 U/L    Total Protein 6.7 6.4 - 8.2 g/dL    AST 29 9 - 39 U/L    Bilirubin, Total 0.5 0.0 - 1.2 mg/dL    ALT 35 10 - 52 U/L   Cholesterol, LDL Direct    Collection Time: 05/21/24  7:50 AM   Result Value Ref Range    LDL, Direct 58 0 - 129 mg/dL   CBC    Collection Time: 05/21/24  7:50 AM   Result Value Ref Range    WBC 5.6 4.4 - 11.3 x10*3/uL    nRBC 0.0 0.0 - 0.0 /100 WBCs    RBC 3.80 (L) 4.50 - 5.90 x10*6/uL    Hemoglobin 11.6 (L) 13.5 - 17.5 g/dL    Hematocrit 36.1 (L) 41.0 - 52.0 %    MCV 95 80 - 100 fL    MCH 30.5 26.0 - 34.0 pg    MCHC 32.1 32.0 - 36.0 g/dL    RDW 13.9 11.5 - 14.5 %    Platelets 405 150 - 450 x10*3/uL   Prostate Specific Antigen, Screen    Collection Time: 05/21/24  7:50 AM   Result Value Ref Range    Prostate Specific Antigen,Screen 0.68 <=4.00 ng/mL   Vitamin D 25-Hydroxy,Total (for eval of Vitamin D levels)    Collection Time: 05/21/24  7:50 AM   Result Value Ref Range    Vitamin D, 25-Hydroxy, Total 41 30 - 100 ng/mL     Recent labs reviewed with patient hemoglobin has improved we will continue to monitor.    EKG  Chest x-ray  Patient to follow-up with his cardiologist who he sees at the [facility] for further evaluation he is to call or go to the ER if any recurrent symptoms.    Follow-up with general surgeon regarding his abdominal wound.    Blood sugar slightly elevated we discussed dietary modifications we will recheck fasting blood sugar and A1c before his upcoming appointment in 6 weeks.    Return to office in 6 weeks with fasting lab work and reassessment of his case.    Assessment/Plan   Problem List Items Addressed This Visit             ICD-10-CM    Paroxysmal A-fib (Multi) I48.0     Patient anticoagulated on Xarelto.  Is also on diltiazem  for rate control.  Follows with cardiology.  He states he is can to discuss with them if he has any options to possibly get out of A-fib completely.         Relevant Orders    ECG 12 lead (Clinic Performed)    CBC    Comprehensive Metabolic Panel    Benign essential hypertension I10     Stable continue current medication         Relevant Orders    Comprehensive Metabolic Panel    Atypical chest pain - Primary R07.89     EKG  Chest x-ray   patient will follow-up with his cardiologist at the [facility].         Relevant Orders    ECG 12 lead (Clinic Performed)    XR chest 2 views    Acute blood loss anemia D62     Hemoglobin is improved up to 11.6.  Recheck CBC iron and ferritin before next appointment in 6 weeks.         Relevant Orders    CBC    Comprehensive Metabolic Panel    Iron    Ferritin    Impaired fasting glucose R73.01     Blood sugar slightly elevated recheck fasting blood sugar and A1c before next appointment in 6 weeks         Relevant Orders    Comprehensive Metabolic Panel    Hemoglobin A1C

## 2024-05-23 NOTE — ASSESSMENT & PLAN NOTE
Hemoglobin is improved up to 11.6.  Recheck CBC iron and ferritin before next appointment in 6 weeks.

## 2024-05-23 NOTE — ASSESSMENT & PLAN NOTE
Blood sugar slightly elevated recheck fasting blood sugar and A1c before next appointment in 6 weeks

## 2024-07-11 ENCOUNTER — APPOINTMENT (OUTPATIENT)
Dept: PRIMARY CARE | Facility: CLINIC | Age: 64
End: 2024-07-11
Payer: MEDICARE

## 2024-07-11 ENCOUNTER — OFFICE VISIT (OUTPATIENT)
Dept: PRIMARY CARE | Facility: CLINIC | Age: 64
End: 2024-07-11
Payer: MEDICARE

## 2024-07-11 VITALS
HEIGHT: 77 IN | SYSTOLIC BLOOD PRESSURE: 131 MMHG | OXYGEN SATURATION: 95 % | WEIGHT: 315 LBS | DIASTOLIC BLOOD PRESSURE: 83 MMHG | BODY MASS INDEX: 37.19 KG/M2 | HEART RATE: 77 BPM | TEMPERATURE: 97.6 F | RESPIRATION RATE: 16 BRPM

## 2024-07-11 DIAGNOSIS — E78.1 ESSENTIAL HYPERTRIGLYCERIDEMIA: ICD-10-CM

## 2024-07-11 DIAGNOSIS — R73.01 IMPAIRED FASTING GLUCOSE: ICD-10-CM

## 2024-07-11 DIAGNOSIS — D64.9 ANEMIA, UNSPECIFIED TYPE: ICD-10-CM

## 2024-07-11 DIAGNOSIS — R07.89 ATYPICAL CHEST PAIN: ICD-10-CM

## 2024-07-11 DIAGNOSIS — E55.9 VITAMIN D DEFICIENCY: ICD-10-CM

## 2024-07-11 DIAGNOSIS — I48.0 PAROXYSMAL A-FIB (MULTI): ICD-10-CM

## 2024-07-11 DIAGNOSIS — I48.0 PAROXYSMAL ATRIAL FIBRILLATION (MULTI): Primary | ICD-10-CM

## 2024-07-11 DIAGNOSIS — E83.42 HYPOMAGNESEMIA: ICD-10-CM

## 2024-07-11 DIAGNOSIS — I10 BENIGN ESSENTIAL HYPERTENSION: ICD-10-CM

## 2024-07-11 DIAGNOSIS — Z13.29 THYROID DISORDER SCREEN: ICD-10-CM

## 2024-07-11 DIAGNOSIS — I50.32 CHRONIC HEART FAILURE WITH PRESERVED EJECTION FRACTION (MULTI): ICD-10-CM

## 2024-07-11 PROBLEM — E66.01 MORBID OBESITY WITH BMI OF 40.0-44.9, ADULT (MULTI): Status: RESOLVED | Noted: 2024-01-24 | Resolved: 2024-07-11

## 2024-07-11 PROBLEM — T85.79XA: Status: RESOLVED | Noted: 2024-04-23 | Resolved: 2024-07-11

## 2024-07-11 PROBLEM — T14.8XXD DELAYED WOUND HEALING: Status: RESOLVED | Noted: 2024-03-26 | Resolved: 2024-07-11

## 2024-07-11 PROBLEM — D22.9 SKIN MOLE: Status: RESOLVED | Noted: 2023-06-21 | Resolved: 2024-07-11

## 2024-07-11 PROCEDURE — 1158F ADVNC CARE PLAN TLK DOCD: CPT | Performed by: FAMILY MEDICINE

## 2024-07-11 PROCEDURE — 1123F ACP DISCUSS/DSCN MKR DOCD: CPT | Performed by: FAMILY MEDICINE

## 2024-07-11 PROCEDURE — 3008F BODY MASS INDEX DOCD: CPT | Performed by: FAMILY MEDICINE

## 2024-07-11 PROCEDURE — 1159F MED LIST DOCD IN RCRD: CPT | Performed by: FAMILY MEDICINE

## 2024-07-11 PROCEDURE — 99214 OFFICE O/P EST MOD 30 MIN: CPT | Performed by: FAMILY MEDICINE

## 2024-07-11 PROCEDURE — 1160F RVW MEDS BY RX/DR IN RCRD: CPT | Performed by: FAMILY MEDICINE

## 2024-07-11 PROCEDURE — 3079F DIAST BP 80-89 MM HG: CPT | Performed by: FAMILY MEDICINE

## 2024-07-11 PROCEDURE — 3075F SYST BP GE 130 - 139MM HG: CPT | Performed by: FAMILY MEDICINE

## 2024-07-11 PROCEDURE — 1036F TOBACCO NON-USER: CPT | Performed by: FAMILY MEDICINE

## 2024-07-11 ASSESSMENT — ENCOUNTER SYMPTOMS
CHILLS: 0
FEVER: 0
CONFUSION: 0
PALPITATIONS: 0
ABDOMINAL PAIN: 0
SHORTNESS OF BREATH: 0
ARTHRALGIAS: 0
CHEST TIGHTNESS: 0

## 2024-07-11 NOTE — ASSESSMENT & PLAN NOTE
Currently asymptomatic has seen cardiology had echo done earlier today further plans pending those results.

## 2024-07-11 NOTE — PROGRESS NOTES
"Subjective   Patient ID: Rambo Daigle is a 64 y.o. male who presents for Follow-up (6 month follow up ).    HPI   Patient today for follow-up overall doing well says surgical wound abdomen is healed over and has been released.  He has seen cardiology they sent him for an echo which he just had done today results are pending he denies any episodes of chest pain.  Further plans pending echo results and cardiology's input.  He also is made an appointment to see an electrophysiologist to explore potential options for further treatment of his A-fib and an effort to get him back in sinus rhythm on a consistent basis.  Review of Systems   Constitutional:  Negative for chills and fever.   HENT:  Negative for congestion and ear pain.    Eyes:  Negative for visual disturbance.   Respiratory:  Negative for chest tightness and shortness of breath.    Cardiovascular:  Negative for chest pain and palpitations.   Gastrointestinal:  Negative for abdominal pain.   Musculoskeletal:  Negative for arthralgias.   Skin:  Negative for pallor.   Psychiatric/Behavioral:  Negative for confusion.        Objective   /83   Pulse 77   Temp 36.4 °C (97.6 °F) (Temporal)   Resp 16   Ht 1.956 m (6' 5\")   Wt (!) 153 kg (336 lb 12.8 oz)   SpO2 95%   BMI 39.94 kg/m²     Physical Exam  Vitals and nursing note reviewed.   Constitutional:       General: He is not in acute distress.     Appearance: Normal appearance. He is not ill-appearing.   HENT:      Head: Normocephalic and atraumatic.      Right Ear: Tympanic membrane, ear canal and external ear normal.      Left Ear: Tympanic membrane, ear canal and external ear normal.      Mouth/Throat:      Pharynx: Oropharynx is clear.   Eyes:      Extraocular Movements: Extraocular movements intact.   Cardiovascular:      Rate and Rhythm: Normal rate and regular rhythm.      Pulses: Normal pulses.      Heart sounds: Normal heart sounds.   Pulmonary:      Effort: Pulmonary effort is normal.      " Breath sounds: Normal breath sounds.   Abdominal:      General: Abdomen is flat. Bowel sounds are normal.      Palpations: Abdomen is soft.      Tenderness: There is no abdominal tenderness.   Musculoskeletal:         General: Normal range of motion.      Cervical back: Neck supple.   Skin:     General: Skin is warm.      Comments: Previous open surgical wound has healed over no active drainage no erythema or obvious signs of infection.   Neurological:      Mental Status: He is alert and oriented to person, place, and time. Mental status is at baseline.   Psychiatric:         Mood and Affect: Mood normal.     No results found for this or any previous visit (from the past 1008 hour(s)).  Patient not any of his lab work done he will get it done before the end of the month and call for results    Cardiology consult reviewed with patient    Continue current medications  Await cardiology's input pending echo results  Return to our office in December with fasting labs and reassessment of his case      Assessment/Plan   Problem List Items Addressed This Visit             ICD-10-CM    Anemia D64.9     Check labs this month call for results         Relevant Orders    CBC    Ferritin    Iron    Paroxysmal A-fib (Multi) I48.0     Patient anticoagulated with Xarelto seeing electrophysiologist through the [facility] to explore additional treatment and the possibility of getting back into sinus rhythm on a consistent basis.         Benign essential hypertension I10     Stable continue current treatment         Relevant Orders    Follow Up In Primary Care - Established    CBC    Comprehensive Metabolic Panel    Essential hypertriglyceridemia E78.1    Relevant Orders    Follow Up In Primary Care - Established    Comprehensive Metabolic Panel    Lipid Panel    Vitamin D deficiency E55.9     Continue to monitor supplement as needed         Relevant Orders    Vitamin D 25-Hydroxy,Total (for eval of Vitamin D levels)    Thyroid  disorder screen Z13.29     TSH with reflex         Relevant Orders    TSH with reflex to Free T4 if abnormal    Chronic heart failure with preserved ejection fraction (Multi) I50.32     Clinically stable continue current treatment follow-up with cardiology         Atypical chest pain R07.89     Currently asymptomatic has seen cardiology had echo done earlier today further plans pending those results.         Impaired fasting glucose R73.01     Check fasting blood sugar and A1c follow diabetic diet         Relevant Orders    Follow Up In Primary Care - Established    Comprehensive Metabolic Panel    Hemoglobin A1C    Hypomagnesemia E83.42     Continue to monitor supplement if needed         Relevant Orders    Magnesium     Other Visit Diagnoses         Codes    Paroxysmal atrial fibrillation (Multi)    -  Primary I48.0    Relevant Orders    Follow Up In Primary Care - Established

## 2024-07-11 NOTE — ASSESSMENT & PLAN NOTE
Patient anticoagulated with Xarelto seeing electrophysiologist through the [facility] to explore additional treatment and the possibility of getting back into sinus rhythm on a consistent basis.

## 2024-07-24 ENCOUNTER — APPOINTMENT (OUTPATIENT)
Dept: PRIMARY CARE | Facility: CLINIC | Age: 64
End: 2024-07-24
Payer: MEDICARE

## 2024-08-06 ENCOUNTER — LAB (OUTPATIENT)
Dept: LAB | Facility: LAB | Age: 64
End: 2024-08-06
Payer: MEDICARE

## 2024-08-06 DIAGNOSIS — R73.01 IMPAIRED FASTING GLUCOSE: ICD-10-CM

## 2024-08-06 DIAGNOSIS — I10 BENIGN ESSENTIAL HYPERTENSION: ICD-10-CM

## 2024-08-06 DIAGNOSIS — D62 ACUTE BLOOD LOSS ANEMIA: ICD-10-CM

## 2024-08-06 DIAGNOSIS — I48.0 PAROXYSMAL A-FIB (MULTI): ICD-10-CM

## 2024-08-06 LAB
ALBUMIN SERPL BCP-MCNC: 4.1 G/DL (ref 3.4–5)
ALP SERPL-CCNC: 120 U/L (ref 33–136)
ALT SERPL W P-5'-P-CCNC: 20 U/L (ref 10–52)
ANION GAP SERPL CALC-SCNC: 12 MMOL/L (ref 10–20)
AST SERPL W P-5'-P-CCNC: 18 U/L (ref 9–39)
BILIRUB SERPL-MCNC: 0.7 MG/DL (ref 0–1.2)
BUN SERPL-MCNC: 19 MG/DL (ref 6–23)
CALCIUM SERPL-MCNC: 9 MG/DL (ref 8.6–10.3)
CHLORIDE SERPL-SCNC: 103 MMOL/L (ref 98–107)
CO2 SERPL-SCNC: 28 MMOL/L (ref 21–32)
CREAT SERPL-MCNC: 0.98 MG/DL (ref 0.5–1.3)
EGFRCR SERPLBLD CKD-EPI 2021: 85 ML/MIN/1.73M*2
ERYTHROCYTE [DISTWIDTH] IN BLOOD BY AUTOMATED COUNT: 15.2 % (ref 11.5–14.5)
EST. AVERAGE GLUCOSE BLD GHB EST-MCNC: 114 MG/DL
FERRITIN SERPL-MCNC: 69 NG/ML (ref 20–300)
GLUCOSE SERPL-MCNC: 94 MG/DL (ref 74–99)
HBA1C MFR BLD: 5.6 %
HCT VFR BLD AUTO: 43.3 % (ref 41–52)
HGB BLD-MCNC: 14.2 G/DL (ref 13.5–17.5)
IRON SERPL-MCNC: 134 UG/DL (ref 35–150)
MCH RBC QN AUTO: 30.7 PG (ref 26–34)
MCHC RBC AUTO-ENTMCNC: 32.8 G/DL (ref 32–36)
MCV RBC AUTO: 94 FL (ref 80–100)
NRBC BLD-RTO: 0 /100 WBCS (ref 0–0)
PLATELET # BLD AUTO: 273 X10*3/UL (ref 150–450)
POTASSIUM SERPL-SCNC: 4.5 MMOL/L (ref 3.5–5.3)
PROT SERPL-MCNC: 6.9 G/DL (ref 6.4–8.2)
RBC # BLD AUTO: 4.63 X10*6/UL (ref 4.5–5.9)
SODIUM SERPL-SCNC: 138 MMOL/L (ref 136–145)
WBC # BLD AUTO: 4.9 X10*3/UL (ref 4.4–11.3)

## 2024-08-06 PROCEDURE — 80053 COMPREHEN METABOLIC PANEL: CPT

## 2024-08-06 PROCEDURE — 82728 ASSAY OF FERRITIN: CPT

## 2024-08-06 PROCEDURE — 83540 ASSAY OF IRON: CPT

## 2024-08-06 PROCEDURE — 36415 COLL VENOUS BLD VENIPUNCTURE: CPT

## 2024-08-06 PROCEDURE — 85027 COMPLETE CBC AUTOMATED: CPT

## 2024-08-06 PROCEDURE — 83036 HEMOGLOBIN GLYCOSYLATED A1C: CPT

## 2024-09-05 ENCOUNTER — TELEPHONE (OUTPATIENT)
Dept: PRIMARY CARE | Facility: CLINIC | Age: 64
End: 2024-09-05
Payer: MEDICARE

## 2024-09-05 NOTE — TELEPHONE ENCOUNTER
Patient wanted to know if you would be willing to write him a prescription for new cushions in his Cpap mask, as his insurance is now requiring an order.

## 2024-12-09 ENCOUNTER — LAB (OUTPATIENT)
Dept: LAB | Facility: LAB | Age: 64
End: 2024-12-09
Payer: MEDICARE

## 2024-12-09 DIAGNOSIS — D64.9 ANEMIA, UNSPECIFIED TYPE: ICD-10-CM

## 2024-12-09 DIAGNOSIS — E78.1 ESSENTIAL HYPERTRIGLYCERIDEMIA: ICD-10-CM

## 2024-12-09 DIAGNOSIS — I10 BENIGN ESSENTIAL HYPERTENSION: ICD-10-CM

## 2024-12-09 DIAGNOSIS — Z13.29 THYROID DISORDER SCREEN: ICD-10-CM

## 2024-12-09 DIAGNOSIS — E55.9 VITAMIN D DEFICIENCY: ICD-10-CM

## 2024-12-09 DIAGNOSIS — E83.42 HYPOMAGNESEMIA: ICD-10-CM

## 2024-12-09 DIAGNOSIS — R73.01 IMPAIRED FASTING GLUCOSE: ICD-10-CM

## 2024-12-09 LAB
25(OH)D3 SERPL-MCNC: 32 NG/ML (ref 30–100)
ALBUMIN SERPL BCP-MCNC: 4 G/DL (ref 3.4–5)
ALP SERPL-CCNC: 92 U/L (ref 33–136)
ALT SERPL W P-5'-P-CCNC: 23 U/L (ref 10–52)
ANION GAP SERPL CALC-SCNC: 10 MMOL/L (ref 10–20)
AST SERPL W P-5'-P-CCNC: 19 U/L (ref 9–39)
BILIRUB SERPL-MCNC: 0.6 MG/DL (ref 0–1.2)
BUN SERPL-MCNC: 18 MG/DL (ref 6–23)
CALCIUM SERPL-MCNC: 8.6 MG/DL (ref 8.6–10.3)
CHLORIDE SERPL-SCNC: 103 MMOL/L (ref 98–107)
CHOLEST SERPL-MCNC: 179 MG/DL (ref 0–199)
CHOLESTEROL/HDL RATIO: 2.7
CO2 SERPL-SCNC: 30 MMOL/L (ref 21–32)
CREAT SERPL-MCNC: 0.93 MG/DL (ref 0.5–1.3)
EGFRCR SERPLBLD CKD-EPI 2021: >90 ML/MIN/1.73M*2
ERYTHROCYTE [DISTWIDTH] IN BLOOD BY AUTOMATED COUNT: 13.2 % (ref 11.5–14.5)
EST. AVERAGE GLUCOSE BLD GHB EST-MCNC: 94 MG/DL
FERRITIN SERPL-MCNC: 139 NG/ML (ref 20–300)
GLUCOSE SERPL-MCNC: 96 MG/DL (ref 74–99)
HBA1C MFR BLD: 4.9 %
HCT VFR BLD AUTO: 40.4 % (ref 41–52)
HDLC SERPL-MCNC: 67.3 MG/DL
HGB BLD-MCNC: 13.3 G/DL (ref 13.5–17.5)
IRON SERPL-MCNC: 140 UG/DL (ref 35–150)
LDLC SERPL CALC-MCNC: 83 MG/DL
MAGNESIUM SERPL-MCNC: 2.25 MG/DL (ref 1.6–2.4)
MCH RBC QN AUTO: 31.7 PG (ref 26–34)
MCHC RBC AUTO-ENTMCNC: 32.9 G/DL (ref 32–36)
MCV RBC AUTO: 96 FL (ref 80–100)
NON HDL CHOLESTEROL: 112 MG/DL (ref 0–149)
NRBC BLD-RTO: 0 /100 WBCS (ref 0–0)
PLATELET # BLD AUTO: 256 X10*3/UL (ref 150–450)
POTASSIUM SERPL-SCNC: 4.5 MMOL/L (ref 3.5–5.3)
PROT SERPL-MCNC: 6.5 G/DL (ref 6.4–8.2)
RBC # BLD AUTO: 4.2 X10*6/UL (ref 4.5–5.9)
SODIUM SERPL-SCNC: 138 MMOL/L (ref 136–145)
TRIGL SERPL-MCNC: 146 MG/DL (ref 0–149)
TSH SERPL-ACNC: 2.25 MIU/L (ref 0.44–3.98)
VLDL: 29 MG/DL (ref 0–40)
WBC # BLD AUTO: 4.1 X10*3/UL (ref 4.4–11.3)

## 2024-12-09 PROCEDURE — 83540 ASSAY OF IRON: CPT

## 2024-12-09 PROCEDURE — 80061 LIPID PANEL: CPT

## 2024-12-09 PROCEDURE — 83735 ASSAY OF MAGNESIUM: CPT

## 2024-12-09 PROCEDURE — 36415 COLL VENOUS BLD VENIPUNCTURE: CPT

## 2024-12-09 PROCEDURE — 82728 ASSAY OF FERRITIN: CPT

## 2024-12-09 PROCEDURE — 85027 COMPLETE CBC AUTOMATED: CPT

## 2024-12-09 PROCEDURE — 84443 ASSAY THYROID STIM HORMONE: CPT

## 2024-12-09 PROCEDURE — 80053 COMPREHEN METABOLIC PANEL: CPT

## 2024-12-09 PROCEDURE — 82306 VITAMIN D 25 HYDROXY: CPT

## 2024-12-09 PROCEDURE — 83036 HEMOGLOBIN GLYCOSYLATED A1C: CPT

## 2024-12-12 ENCOUNTER — APPOINTMENT (OUTPATIENT)
Dept: PRIMARY CARE | Facility: CLINIC | Age: 64
End: 2024-12-12
Payer: MEDICARE

## 2024-12-12 VITALS
BODY MASS INDEX: 41.27 KG/M2 | HEART RATE: 62 BPM | TEMPERATURE: 97 F | OXYGEN SATURATION: 97 % | RESPIRATION RATE: 14 BRPM | SYSTOLIC BLOOD PRESSURE: 134 MMHG | DIASTOLIC BLOOD PRESSURE: 82 MMHG | WEIGHT: 315 LBS

## 2024-12-12 DIAGNOSIS — E55.9 VITAMIN D DEFICIENCY: ICD-10-CM

## 2024-12-12 DIAGNOSIS — R73.01 IMPAIRED FASTING GLUCOSE: ICD-10-CM

## 2024-12-12 DIAGNOSIS — Z13.29 THYROID DISORDER SCREEN: ICD-10-CM

## 2024-12-12 DIAGNOSIS — I50.32 CHRONIC HEART FAILURE WITH PRESERVED EJECTION FRACTION: ICD-10-CM

## 2024-12-12 DIAGNOSIS — E78.1 ESSENTIAL HYPERTRIGLYCERIDEMIA: ICD-10-CM

## 2024-12-12 DIAGNOSIS — I48.0 PAROXYSMAL ATRIAL FIBRILLATION (MULTI): ICD-10-CM

## 2024-12-12 DIAGNOSIS — I10 BENIGN ESSENTIAL HYPERTENSION: ICD-10-CM

## 2024-12-12 DIAGNOSIS — I48.0 PAROXYSMAL A-FIB (MULTI): Primary | ICD-10-CM

## 2024-12-12 DIAGNOSIS — Z12.5 SCREENING FOR PROSTATE CANCER: ICD-10-CM

## 2024-12-12 PROBLEM — R07.89 ATYPICAL CHEST PAIN: Status: RESOLVED | Noted: 2024-05-23 | Resolved: 2024-12-12

## 2024-12-12 PROBLEM — D62 ACUTE BLOOD LOSS ANEMIA: Status: RESOLVED | Noted: 2024-05-23 | Resolved: 2024-12-12

## 2024-12-12 PROCEDURE — 1123F ACP DISCUSS/DSCN MKR DOCD: CPT | Performed by: FAMILY MEDICINE

## 2024-12-12 PROCEDURE — 99214 OFFICE O/P EST MOD 30 MIN: CPT | Performed by: FAMILY MEDICINE

## 2024-12-12 PROCEDURE — G2211 COMPLEX E/M VISIT ADD ON: HCPCS | Performed by: FAMILY MEDICINE

## 2024-12-12 PROCEDURE — 1158F ADVNC CARE PLAN TLK DOCD: CPT | Performed by: FAMILY MEDICINE

## 2024-12-12 PROCEDURE — 1159F MED LIST DOCD IN RCRD: CPT | Performed by: FAMILY MEDICINE

## 2024-12-12 PROCEDURE — 1160F RVW MEDS BY RX/DR IN RCRD: CPT | Performed by: FAMILY MEDICINE

## 2024-12-12 PROCEDURE — 1036F TOBACCO NON-USER: CPT | Performed by: FAMILY MEDICINE

## 2024-12-12 PROCEDURE — 3075F SYST BP GE 130 - 139MM HG: CPT | Performed by: FAMILY MEDICINE

## 2024-12-12 PROCEDURE — 3079F DIAST BP 80-89 MM HG: CPT | Performed by: FAMILY MEDICINE

## 2024-12-12 RX ORDER — AMOXICILLIN 500 MG/1
TABLET, FILM COATED ORAL
COMMUNITY
Start: 2024-08-27

## 2024-12-12 ASSESSMENT — ENCOUNTER SYMPTOMS
CHILLS: 0
FEVER: 0
CONFUSION: 0
CHEST TIGHTNESS: 0
PALPITATIONS: 0
SHORTNESS OF BREATH: 0
ARTHRALGIAS: 0
ABDOMINAL PAIN: 0

## 2024-12-12 NOTE — PROGRESS NOTES
Subjective   Patient ID: Rambo Daigle is a 64 y.o. male who presents for Follow-up (5 month).    HPI patient today for follow-up of ongoing healthcare issues and review of blood work for most part doing well.  He is been following with his cardiology team through [facility].  Ended up undergoing interventional procedures/PVI for his A-fib so far it has been successful as he has remained in sinus rhythm.  He is still on Xarelto.  He continues to follow with cardiology.    Review of Systems   Constitutional:  Negative for chills and fever.   HENT:  Negative for congestion and ear pain.    Eyes:  Negative for visual disturbance.   Respiratory:  Negative for chest tightness and shortness of breath.    Cardiovascular:  Negative for chest pain and palpitations.   Gastrointestinal:  Negative for abdominal pain.   Musculoskeletal:  Negative for arthralgias.   Skin:  Negative for pallor.   Psychiatric/Behavioral:  Negative for confusion.        Objective   /82   Pulse 62   Temp 36.1 °C (97 °F)   Resp 14   Wt (!) 158 kg (348 lb)   SpO2 97%   BMI 41.27 kg/m²     Physical Exam  Vitals and nursing note reviewed.   Constitutional:       General: He is not in acute distress.     Appearance: Normal appearance. He is not ill-appearing.   HENT:      Head: Normocephalic and atraumatic.      Right Ear: Tympanic membrane, ear canal and external ear normal.      Left Ear: Tympanic membrane, ear canal and external ear normal.      Mouth/Throat:      Pharynx: Oropharynx is clear.   Eyes:      Extraocular Movements: Extraocular movements intact.   Cardiovascular:      Rate and Rhythm: Normal rate and regular rhythm.      Pulses: Normal pulses.      Heart sounds: Normal heart sounds.   Pulmonary:      Effort: Pulmonary effort is normal.      Breath sounds: Normal breath sounds.   Abdominal:      General: Abdomen is flat. Bowel sounds are normal.      Palpations: Abdomen is soft.      Tenderness: There is no abdominal tenderness.    Musculoskeletal:         General: Normal range of motion.      Cervical back: Neck supple.   Skin:     General: Skin is warm.   Neurological:      Mental Status: He is alert and oriented to person, place, and time. Mental status is at baseline.   Psychiatric:         Mood and Affect: Mood normal.       Recent Results (from the past 6 weeks)   CBC    Collection Time: 12/09/24  8:32 AM   Result Value Ref Range    WBC 4.1 (L) 4.4 - 11.3 x10*3/uL    nRBC 0.0 0.0 - 0.0 /100 WBCs    RBC 4.20 (L) 4.50 - 5.90 x10*6/uL    Hemoglobin 13.3 (L) 13.5 - 17.5 g/dL    Hematocrit 40.4 (L) 41.0 - 52.0 %    MCV 96 80 - 100 fL    MCH 31.7 26.0 - 34.0 pg    MCHC 32.9 32.0 - 36.0 g/dL    RDW 13.2 11.5 - 14.5 %    Platelets 256 150 - 450 x10*3/uL   Comprehensive Metabolic Panel    Collection Time: 12/09/24  8:32 AM   Result Value Ref Range    Glucose 96 74 - 99 mg/dL    Sodium 138 136 - 145 mmol/L    Potassium 4.5 3.5 - 5.3 mmol/L    Chloride 103 98 - 107 mmol/L    Bicarbonate 30 21 - 32 mmol/L    Anion Gap 10 10 - 20 mmol/L    Urea Nitrogen 18 6 - 23 mg/dL    Creatinine 0.93 0.50 - 1.30 mg/dL    eGFR >90 >60 mL/min/1.73m*2    Calcium 8.6 8.6 - 10.3 mg/dL    Albumin 4.0 3.4 - 5.0 g/dL    Alkaline Phosphatase 92 33 - 136 U/L    Total Protein 6.5 6.4 - 8.2 g/dL    AST 19 9 - 39 U/L    Bilirubin, Total 0.6 0.0 - 1.2 mg/dL    ALT 23 10 - 52 U/L   Ferritin    Collection Time: 12/09/24  8:32 AM   Result Value Ref Range    Ferritin 139 20 - 300 ng/mL   Hemoglobin A1C    Collection Time: 12/09/24  8:32 AM   Result Value Ref Range    Hemoglobin A1C 4.9 See comment %    Estimated Average Glucose 94 Not Established mg/dL   Iron    Collection Time: 12/09/24  8:32 AM   Result Value Ref Range    Iron 140 35 - 150 ug/dL   Lipid Panel    Collection Time: 12/09/24  8:32 AM   Result Value Ref Range    Cholesterol 179 0 - 199 mg/dL    HDL-Cholesterol 67.3 mg/dL    Cholesterol/HDL Ratio 2.7     LDL Calculated 83 <=99 mg/dL    VLDL 29 0 - 40 mg/dL     Triglycerides 146 0 - 149 mg/dL    Non HDL Cholesterol 112 0 - 149 mg/dL   Magnesium    Collection Time: 12/09/24  8:32 AM   Result Value Ref Range    Magnesium 2.25 1.60 - 2.40 mg/dL   TSH with reflex to Free T4 if abnormal    Collection Time: 12/09/24  8:32 AM   Result Value Ref Range    Thyroid Stimulating Hormone 2.25 0.44 - 3.98 mIU/L   Vitamin D 25-Hydroxy,Total (for eval of Vitamin D levels)    Collection Time: 12/09/24  8:32 AM   Result Value Ref Range    Vitamin D, 25-Hydroxy, Total 32 30 - 100 ng/mL     Recent labs reviewed with patient overall numbers are stable he will continue with current medications refill Xarelto and follow through with cardiology    Refuses all immunizations    Return in 5 months with repeat fasting labs    Obstructive sleep apnea.  Patient continues to use PAP therapy which she feels is beneficial.  In my medical opinion he should continue at this time.    Assessment/Plan   Problem List Items Addressed This Visit             ICD-10-CM    Paroxysmal A-fib (Multi) - Primary I48.0     Status post PVI.  Now in sinus rhythm.  Refill Xarelto and follow-up with cardiology         Relevant Medications    rivaroxaban (Xarelto) 20 mg tablet    Other Relevant Orders    Follow Up In Primary Care - Established    CBC    Comprehensive Metabolic Panel    Benign essential hypertension I10     Stable continue current treatment         Relevant Orders    Follow Up In Primary Care - Established    CBC    Comprehensive Metabolic Panel    Essential hypertriglyceridemia E78.1     Continue to work on dietary modifications         Relevant Orders    Lipid Panel    Vitamin D deficiency E55.9     Continue to monitor and supplement         Relevant Orders    Vitamin D 25-Hydroxy,Total (for eval of Vitamin D levels)    Thyroid disorder screen Z13.29     TSH with reflex         Relevant Orders    TSH with reflex to Free T4 if abnormal    Screening for prostate cancer Z12.5     Screening PSA         Relevant  Orders    Prostate Specific Antigen, Screen    Chronic heart failure with preserved ejection fraction I50.32     Stable continue Lasix 20 mg daily follows with cardiology         Relevant Orders    Follow Up In Primary Care - Established    Impaired fasting glucose R73.01     A1C down to 4.9% continue dietary/lifestyle modifications and work on weight loss         Relevant Orders    Follow Up In Primary Care - Established    Comprehensive Metabolic Panel    Hemoglobin A1C     Other Visit Diagnoses         Codes    Paroxysmal atrial fibrillation (Multi)     I48.0    Relevant Medications    rivaroxaban (Xarelto) 20 mg tablet

## 2024-12-12 NOTE — ASSESSMENT & PLAN NOTE
Relayed results, patient verbalizes understanding.     I see that Dana's next appointment is 12/10/20 which is a MWV.    Do you want to see Dana before that December appointment?   Stable continue current treatment

## 2025-01-09 ENCOUNTER — TELEPHONE (OUTPATIENT)
Dept: PRIMARY CARE | Facility: CLINIC | Age: 65
End: 2025-01-09
Payer: MEDICARE

## 2025-01-09 DIAGNOSIS — G47.33 OSA (OBSTRUCTIVE SLEEP APNEA): ICD-10-CM

## 2025-01-09 NOTE — ASSESSMENT & PLAN NOTE
Patient states he is compliant with his CPAP and feels that he continues to clinically benefit from its use.

## 2025-01-09 NOTE — TELEPHONE ENCOUNTER
Patient called and reported that his Cpap machine was causing him so upper respiratory irritation and trouble sleeping. He reached out to [facility] [facility] and was advised to request a new order for a CPAP machine as his is nearing expiration. Patient wanted to know if that could be ordered and faxed over to [facility] [facility].

## 2025-01-13 NOTE — TELEPHONE ENCOUNTER
Rambo called to follow up on the request for a new CPAP machine. She spoke to someone at [facility] ([phone]) and they decided he was due for a new machine.  He will need a new order for the machine and the last sleep study results sent over to them.

## 2025-03-23 ENCOUNTER — HOSPITAL ENCOUNTER (EMERGENCY)
Facility: HOSPITAL | Age: 65
Discharge: HOME | End: 2025-03-23
Payer: MEDICARE

## 2025-03-23 VITALS
RESPIRATION RATE: 16 BRPM | OXYGEN SATURATION: 97 % | BODY MASS INDEX: 37.19 KG/M2 | HEIGHT: 77 IN | SYSTOLIC BLOOD PRESSURE: 158 MMHG | WEIGHT: 315 LBS | TEMPERATURE: 97.1 F | HEART RATE: 74 BPM | DIASTOLIC BLOOD PRESSURE: 72 MMHG

## 2025-03-23 DIAGNOSIS — S05.02XA ABRASION OF LEFT CORNEA, INITIAL ENCOUNTER: Primary | ICD-10-CM

## 2025-03-23 DIAGNOSIS — T15.92XA FOREIGN BODY OF LEFT EYE, INITIAL ENCOUNTER: ICD-10-CM

## 2025-03-23 PROCEDURE — 65220 REMOVE FOREIGN BODY FROM EYE: CPT | Performed by: NURSE PRACTITIONER

## 2025-03-23 PROCEDURE — 99283 EMERGENCY DEPT VISIT LOW MDM: CPT

## 2025-03-23 PROCEDURE — 2500000005 HC RX 250 GENERAL PHARMACY W/O HCPCS: Performed by: NURSE PRACTITIONER

## 2025-03-23 PROCEDURE — 65222 REMOVE FOREIGN BODY FROM EYE: CPT | Mod: LT

## 2025-03-23 RX ORDER — TETRACAINE HYDROCHLORIDE 5 MG/ML
2 SOLUTION OPHTHALMIC ONCE
Status: DISCONTINUED | OUTPATIENT
Start: 2025-03-23 | End: 2025-03-23 | Stop reason: HOSPADM

## 2025-03-23 RX ORDER — ERYTHROMYCIN 5 MG/G
1 OINTMENT OPHTHALMIC ONCE
Status: COMPLETED | OUTPATIENT
Start: 2025-03-23 | End: 2025-03-23

## 2025-03-23 RX ORDER — ERYTHROMYCIN 5 MG/G
1 OINTMENT OPHTHALMIC EVERY 6 HOURS
Qty: 1 G | Refills: 0 | Status: SHIPPED | OUTPATIENT
Start: 2025-03-23 | End: 2025-03-30

## 2025-03-23 RX ADMIN — ERYTHROMYCIN 1 CM: 5 OINTMENT OPHTHALMIC at 06:27

## 2025-03-23 ASSESSMENT — VISUAL ACUITY
OU: 20/25
OS: 20/25
OU: 1
OD: 20/25

## 2025-03-23 ASSESSMENT — PAIN SCALES - GENERAL: PAINLEVEL_OUTOF10: 3

## 2025-03-23 ASSESSMENT — COLUMBIA-SUICIDE SEVERITY RATING SCALE - C-SSRS
1. IN THE PAST MONTH, HAVE YOU WISHED YOU WERE DEAD OR WISHED YOU COULD GO TO SLEEP AND NOT WAKE UP?: NO
6. HAVE YOU EVER DONE ANYTHING, STARTED TO DO ANYTHING, OR PREPARED TO DO ANYTHING TO END YOUR LIFE?: NO
2. HAVE YOU ACTUALLY HAD ANY THOUGHTS OF KILLING YOURSELF?: NO

## 2025-03-23 ASSESSMENT — PAIN - FUNCTIONAL ASSESSMENT: PAIN_FUNCTIONAL_ASSESSMENT: 0-10

## 2025-03-23 NOTE — ED PROVIDER NOTES
HPI   Chief Complaint   Patient presents with    Eye Pain     Left eye discomfort. Possibly got dry wall power in the eye yesterday. Washed out the eye yesterday but woke up with it still uncomfortable       65-year-old male presents to the emergency department today for left eye discomfort.  Patient states that he was sawing drywall on Thursday and believes he may have got something in his eye at that time.  Friday he had some discomfort and constant tearing.  Yesterday he has been rinsing his eye out with minimal improvement.  He does still feel like he has something in his eye.  Denies any vision changes, headache, nausea or vomiting.  He does not wear contact lenses.  No fever or chills.                          [place] Coma Scale Score: 15                  Patient History   Past Medical History:   Diagnosis Date    Acute blood loss anemia 05/23/2024    Anemia     Crushing injury of right middle finger, initial encounter 11/17/2016    Crushing injury of right middle finger, initial encounter    Displaced fracture of distal phalanx of right middle finger, initial encounter for open fracture 11/17/2016    Open displaced fracture of distal phalanx of right middle finger, initial encounter    GERD (gastroesophageal reflux disease)     Hypertension     Infected hernioplasty mesh, initial encounter (CMS-HCC) 04/23/2024    Personal history of other diseases of the circulatory system     History of hypertension    Personal history of other diseases of the musculoskeletal system and connective tissue     History of arthritis     Past Surgical History:   Procedure Laterality Date    OTHER SURGICAL HISTORY  04/30/2020    Knee surgery    OTHER SURGICAL HISTORY  04/30/2020    Hip surgery     Family History   Problem Relation Name Age of Onset    Other (denies) Mother          denies history of breast cancer    Atrial fibrillation Father      Other (deafness of hearing loss) Father       Social History     Tobacco Use     Smoking status: Former     Current packs/day: 0.00     Types: Cigarettes     Quit date: 3/6/2023     Years since quittin.0     Passive exposure: Past    Smokeless tobacco: Never   Vaping Use    Vaping status: Never Used   Substance Use Topics    Alcohol use: Yes     Alcohol/week: 10.0 standard drinks of alcohol     Types: 10 Standard drinks or equivalent per week     Comment: weekly    Drug use: Never       Physical Exam   ED Triage Vitals [25 0359]   Temperature Heart Rate Respirations BP   36.2 °C (97.1 °F) 72 16 174/77      Pulse Ox Temp Source Heart Rate Source Patient Position   97 % Temporal Monitor Lying      BP Location FiO2 (%)     Left arm --       Physical Exam  Vitals reviewed.   Constitutional:       Appearance: Normal appearance.   HENT:      Head: Normocephalic.   Eyes:      General: Lids are normal. Vision grossly intact. Gaze aligned appropriately.         Right eye: No foreign body, discharge or hordeolum.         Left eye: Foreign body and discharge present.No hordeolum.      Extraocular Movements: Extraocular movements intact.      Conjunctiva/sclera:      Left eye: Left conjunctiva is injected. No chemosis, exudate or hemorrhage.     Pupils: Pupils are equal, round, and reactive to light.      Left eye: Corneal abrasion and fluorescein uptake present. Arelis exam negative.     Slit lamp exam:     Left eye: No photophobia.     Cardiovascular:      Rate and Rhythm: Normal rate and regular rhythm.   Pulmonary:      Effort: Pulmonary effort is normal.      Breath sounds: Normal breath sounds.   Abdominal:      General: Abdomen is flat.      Palpations: Abdomen is soft.   Skin:     General: Skin is warm and dry.      Capillary Refill: Capillary refill takes less than 2 seconds.   Neurological:      Mental Status: He is alert.         Labs Reviewed - No data to display  Pain Management Panel           No data to display              No orders to display       ED Course & MDM   Diagnoses as  of 03/23/25 0617   Abrasion of left cornea, initial encounter   Foreign body of left eye, initial encounter       Medical Decision Making  On initial evaluation patient well-appearing in the emergency department at this time.  Is found to have a foreign body which was easily removed with a Q-tip.  Visual acuities remain the same pre and post removal.  After tetracaine instillation complete resolution in his discomfort.  He does have a clear tearing.  Does have a small corneal abrasion to the 6 o'clock position post removal.  Patient was given erythromycin ointment post removal.  sent a prescription for erythromycin.  Discussed importance of close outpatient follow-up and strict return precautions.  He verbalized understanding of the plan has no further questions or concerns patient we discharged home in improved condition.        Procedure  Foreign Body Removal - Ocular    Performed by: FABY Lambert  Authorized by: FABY Lambert    Consent:     Consent obtained:  Verbal    Consent given by:  Patient    Risks, benefits, and alternatives were discussed: yes      Risks discussed:  Bleeding, corneal damage, infection, worsening of condition, damage to surrounding structures, incomplete removal, globe perforation, pain and visual impairment    Alternatives discussed:  No treatment  Universal protocol:     Procedure explained and questions answered to patient or proxy's satisfaction: yes      Relevant documents present and verified: yes      Test results available: yes      Imaging studies available: yes      Required blood products, implants, devices, and special equipment available: yes      Site/side marked: yes      Immediately prior to procedure, a time out was called: yes      Patient identity confirmed:  Arm band and verbally with patient  Location:     Location:  L corneal  Pre-procedure details:     OS visual acuity:  20/25    OD visual acuity:  20/25    Correction: uncorrected       Fluorescein exam: yes      Fluorescein uptake: yes      Arelis test: negative      Corneal abrasion description:  6 o clock    Corneal abrasion location:  Superior  Anesthesia:     Local anesthetic:  Tetracaine drops  Procedure details:     Localization method:  Fluorescein, direct visualization and Wood's lamp    Removal mechanism:  Moist cotton swab    Foreign bodies recovered:  1    Intact foreign body removal: yes      Residual rust ring observed: no      Residual rust ring removed: no    Post-procedure details:     OS visual acuity:  20/25    OD visual acuity:  20/25    Correction: uncorrected      Confirmation:  No additional foreign bodies on visualization    Dressing:  Antibiotic ointment    Procedure completion:  Tolerated well, no immediate complications        Differential Diagnoses include foreign body, corneal abrasion, corneal ulcer  This is not an exhaustive list of all the diagnosis and therapeutics are considered during the patient's evaluation for an emergency medical condition.    I discussed the differential, results and discharge plan with the patient and/or family/friend/caregiver if present.  I emphasized the importance of follow-up with the physician I referred them to in the timeframe recommended.  I explained reasons for the patient to return to the Emergency Department. Additional verbal discharge instructions were also given and discussed with the patient to supplement those generated by the EMR. We also discussed medications that were prescribed (if any) including common side effects and interactions. The patient was advised to abstain from driving, operating heavy machinery or making significant decisions while taking medications such as opiates and muscle relaxers that may impair this. All questions were addressed.  They understand return precautions and discharge instructions. The patient and/or family/friend/caregiver expressed understanding.           Kristyn Pillai,  PAYTON-CNP  03/23/25 0707

## 2025-05-12 ENCOUNTER — APPOINTMENT (OUTPATIENT)
Dept: PRIMARY CARE | Facility: CLINIC | Age: 65
End: 2025-05-12
Payer: MEDICARE

## 2025-05-20 LAB
25(OH)D3+25(OH)D2 SERPL-MCNC: 45 NG/ML (ref 30–100)
ALBUMIN SERPL-MCNC: 4.2 G/DL (ref 3.6–5.1)
ALP SERPL-CCNC: 97 U/L (ref 35–144)
ALT SERPL-CCNC: 18 U/L (ref 9–46)
ANION GAP SERPL CALCULATED.4IONS-SCNC: 7 MMOL/L (CALC) (ref 7–17)
AST SERPL-CCNC: 16 U/L (ref 10–35)
BILIRUB SERPL-MCNC: 0.5 MG/DL (ref 0.2–1.2)
BUN SERPL-MCNC: 18 MG/DL (ref 7–25)
CALCIUM SERPL-MCNC: 8.8 MG/DL (ref 8.6–10.3)
CHLORIDE SERPL-SCNC: 108 MMOL/L (ref 98–110)
CHOLEST SERPL-MCNC: 137 MG/DL
CHOLEST/HDLC SERPL: 2 (CALC)
CO2 SERPL-SCNC: 26 MMOL/L (ref 20–32)
CREAT SERPL-MCNC: 0.94 MG/DL (ref 0.7–1.35)
EGFRCR SERPLBLD CKD-EPI 2021: 89 ML/MIN/1.73M2
ERYTHROCYTE [DISTWIDTH] IN BLOOD BY AUTOMATED COUNT: 13.4 % (ref 11–15)
EST. AVERAGE GLUCOSE BLD GHB EST-MCNC: 105 MG/DL
EST. AVERAGE GLUCOSE BLD GHB EST-SCNC: 5.8 MMOL/L
GLUCOSE SERPL-MCNC: 95 MG/DL (ref 65–99)
HBA1C MFR BLD: 5.3 %
HCT VFR BLD AUTO: 42.1 % (ref 38.5–50)
HDLC SERPL-MCNC: 68 MG/DL
HGB BLD-MCNC: 13.6 G/DL (ref 13.2–17.1)
LDLC SERPL CALC-MCNC: 54 MG/DL (CALC)
MCH RBC QN AUTO: 31.9 PG (ref 27–33)
MCHC RBC AUTO-ENTMCNC: 32.3 G/DL (ref 32–36)
MCV RBC AUTO: 98.6 FL (ref 80–100)
NONHDLC SERPL-MCNC: 69 MG/DL (CALC)
PLATELET # BLD AUTO: 288 THOUSAND/UL (ref 140–400)
PMV BLD REES-ECKER: 9.9 FL (ref 7.5–12.5)
POTASSIUM SERPL-SCNC: 4.3 MMOL/L (ref 3.5–5.3)
PROT SERPL-MCNC: 6.6 G/DL (ref 6.1–8.1)
PSA SERPL-MCNC: 0.54 NG/ML
RBC # BLD AUTO: 4.27 MILLION/UL (ref 4.2–5.8)
SODIUM SERPL-SCNC: 141 MMOL/L (ref 135–146)
TRIGL SERPL-MCNC: 66 MG/DL
TSH SERPL-ACNC: 2.76 MIU/L (ref 0.4–4.5)
WBC # BLD AUTO: 4.2 THOUSAND/UL (ref 3.8–10.8)

## 2025-05-21 ENCOUNTER — APPOINTMENT (OUTPATIENT)
Dept: PRIMARY CARE | Facility: CLINIC | Age: 65
End: 2025-05-21
Payer: MEDICARE

## 2025-05-21 VITALS
TEMPERATURE: 98 F | DIASTOLIC BLOOD PRESSURE: 69 MMHG | SYSTOLIC BLOOD PRESSURE: 134 MMHG | HEART RATE: 61 BPM | HEIGHT: 77 IN | WEIGHT: 315 LBS | BODY MASS INDEX: 37.19 KG/M2 | OXYGEN SATURATION: 96 %

## 2025-05-21 DIAGNOSIS — I10 BENIGN ESSENTIAL HYPERTENSION: ICD-10-CM

## 2025-05-21 DIAGNOSIS — I48.0 PAROXYSMAL A-FIB (MULTI): ICD-10-CM

## 2025-05-21 DIAGNOSIS — Z00.00 ANNUAL PHYSICAL EXAM: ICD-10-CM

## 2025-05-21 DIAGNOSIS — I50.32 CHRONIC HEART FAILURE WITH PRESERVED EJECTION FRACTION: ICD-10-CM

## 2025-05-21 DIAGNOSIS — J43.8 OTHER EMPHYSEMA (MULTI): ICD-10-CM

## 2025-05-21 DIAGNOSIS — E55.9 VITAMIN D DEFICIENCY: ICD-10-CM

## 2025-05-21 DIAGNOSIS — R73.01 IMPAIRED FASTING GLUCOSE: ICD-10-CM

## 2025-05-21 DIAGNOSIS — Z00.00 MEDICARE ANNUAL WELLNESS VISIT, SUBSEQUENT: Primary | ICD-10-CM

## 2025-05-21 PROCEDURE — G0439 PPPS, SUBSEQ VISIT: HCPCS | Performed by: FAMILY MEDICINE

## 2025-05-21 PROCEDURE — 1036F TOBACCO NON-USER: CPT | Performed by: FAMILY MEDICINE

## 2025-05-21 PROCEDURE — 1160F RVW MEDS BY RX/DR IN RCRD: CPT | Performed by: FAMILY MEDICINE

## 2025-05-21 PROCEDURE — 3008F BODY MASS INDEX DOCD: CPT | Performed by: FAMILY MEDICINE

## 2025-05-21 PROCEDURE — 99397 PER PM REEVAL EST PAT 65+ YR: CPT | Performed by: FAMILY MEDICINE

## 2025-05-21 PROCEDURE — 1158F ADVNC CARE PLAN TLK DOCD: CPT | Performed by: FAMILY MEDICINE

## 2025-05-21 PROCEDURE — 3078F DIAST BP <80 MM HG: CPT | Performed by: FAMILY MEDICINE

## 2025-05-21 PROCEDURE — 1170F FXNL STATUS ASSESSED: CPT | Performed by: FAMILY MEDICINE

## 2025-05-21 PROCEDURE — 99213 OFFICE O/P EST LOW 20 MIN: CPT | Performed by: FAMILY MEDICINE

## 2025-05-21 PROCEDURE — 1159F MED LIST DOCD IN RCRD: CPT | Performed by: FAMILY MEDICINE

## 2025-05-21 PROCEDURE — 3075F SYST BP GE 130 - 139MM HG: CPT | Performed by: FAMILY MEDICINE

## 2025-05-21 PROCEDURE — 1123F ACP DISCUSS/DSCN MKR DOCD: CPT | Performed by: FAMILY MEDICINE

## 2025-05-21 ASSESSMENT — ENCOUNTER SYMPTOMS
OCCASIONAL FEELINGS OF UNSTEADINESS: 0
CHEST TIGHTNESS: 0
SHORTNESS OF BREATH: 0
ARTHRALGIAS: 0
ABDOMINAL PAIN: 0
CONFUSION: 0
FEVER: 0
CHILLS: 0
PALPITATIONS: 0
LOSS OF SENSATION IN FEET: 0
DEPRESSION: 0

## 2025-05-21 ASSESSMENT — ACTIVITIES OF DAILY LIVING (ADL)
DRESSING: INDEPENDENT
MANAGING_FINANCES: INDEPENDENT
BATHING: INDEPENDENT
DOING_HOUSEWORK: INDEPENDENT
GROCERY_SHOPPING: INDEPENDENT
TAKING_MEDICATION: INDEPENDENT

## 2025-05-21 ASSESSMENT — PATIENT HEALTH QUESTIONNAIRE - PHQ9
1. LITTLE INTEREST OR PLEASURE IN DOING THINGS: NOT AT ALL
SUM OF ALL RESPONSES TO PHQ9 QUESTIONS 1 AND 2: 0
2. FEELING DOWN, DEPRESSED OR HOPELESS: NOT AT ALL

## 2025-05-21 NOTE — ASSESSMENT & PLAN NOTE
Recent labs reviewed    Immunization recommendations reviewed he declines flu and pneumonia shot    Colon cancer screening up-to-date

## 2025-05-21 NOTE — PROGRESS NOTES
"Subjective   Reason for Visit: Rambo Daigle is an 65 y.o. male here for a Medicare Wellness visit.     Past Medical, Surgical, and Family History reviewed and updated in chart.    Reviewed all medications by prescribing practitioner or clinical pharmacist (such as prescriptions, OTCs, herbal therapies and supplements) and documented in the medical record.    HPI today for follow-up of ongoing healthcare issues and review of recent lab work overalls been doing okay.  He admits that he is been more sedentary and not eating healthy has gained weight and has been having some trouble losing weight since he is retired.    Patient Care Team:  Dexter Torres MD as PCP - General  Dexter Torres MD as PCP - [employer] PCP     Review of Systems   Constitutional:  Negative for chills and fever.   HENT:  Negative for congestion and ear pain.    Eyes:  Negative for visual disturbance.   Respiratory:  Negative for chest tightness and shortness of breath.    Cardiovascular:  Negative for chest pain and palpitations.   Gastrointestinal:  Negative for abdominal pain.   Musculoskeletal:  Negative for arthralgias.   Skin:  Negative for pallor.   Psychiatric/Behavioral:  Negative for confusion.        Objective   Vitals:  /69   Pulse 61   Temp 36.7 °C (98 °F)   Ht 1.956 m (6' 5\")   Wt (!) 157 kg (345 lb 6.1 oz)   SpO2 96%   BMI 40.96 kg/m²       Physical Exam  Vitals and nursing note reviewed.   Constitutional:       General: He is not in acute distress.     Appearance: Normal appearance. He is not ill-appearing.   HENT:      Head: Normocephalic and atraumatic.      Right Ear: Tympanic membrane, ear canal and external ear normal.      Left Ear: Tympanic membrane, ear canal and external ear normal.      Mouth/Throat:      Pharynx: Oropharynx is clear.   Eyes:      Extraocular Movements: Extraocular movements intact.   Cardiovascular:      Rate and Rhythm: Normal rate and regular rhythm.      Pulses: " Normal pulses.      Heart sounds: Normal heart sounds.   Pulmonary:      Effort: Pulmonary effort is normal.      Breath sounds: Normal breath sounds.   Abdominal:      General: Abdomen is flat. Bowel sounds are normal.      Palpations: Abdomen is soft.      Tenderness: There is no abdominal tenderness.   Musculoskeletal:         General: Normal range of motion.      Cervical back: Neck supple.   Skin:     General: Skin is warm.   Neurological:      Mental Status: He is alert and oriented to person, place, and time. Mental status is at baseline.   Psychiatric:         Mood and Affect: Mood normal.       Recent Results (from the past 6 weeks)   Lipid Panel    Collection Time: 05/19/25  9:01 AM   Result Value Ref Range    CHOLESTEROL, TOTAL 137 <200 mg/dL    HDL CHOLESTEROL 68 > OR = 40 mg/dL    TRIGLYCERIDES 66 <150 mg/dL    LDL-CHOLESTEROL 54 mg/dL (calc)    CHOL/HDLC RATIO 2.0 <5.0 (calc)    NON HDL CHOLESTEROL 69 <130 mg/dL (calc)   Comprehensive Metabolic Panel    Collection Time: 05/19/25  9:01 AM   Result Value Ref Range    GLUCOSE 95 65 - 99 mg/dL    UREA NITROGEN (BUN) 18 7 - 25 mg/dL    CREATININE 0.94 0.70 - 1.35 mg/dL    EGFR 89 > OR = 60 mL/min/1.73m2    SODIUM 141 135 - 146 mmol/L    POTASSIUM 4.3 3.5 - 5.3 mmol/L    CHLORIDE 108 98 - 110 mmol/L    CARBON DIOXIDE 26 20 - 32 mmol/L    ELECTROLYTE BALANCE 7 7 - 17 mmol/L (calc)    CALCIUM 8.8 8.6 - 10.3 mg/dL    PROTEIN, TOTAL 6.6 6.1 - 8.1 g/dL    ALBUMIN 4.2 3.6 - 5.1 g/dL    BILIRUBIN, TOTAL 0.5 0.2 - 1.2 mg/dL    ALKALINE PHOSPHATASE 97 35 - 144 U/L    AST 16 10 - 35 U/L    ALT 18 9 - 46 U/L   CBC    Collection Time: 05/19/25  9:01 AM   Result Value Ref Range    WHITE BLOOD CELL COUNT 4.2 3.8 - 10.8 Thousand/uL    RED BLOOD CELL COUNT 4.27 4.20 - 5.80 Million/uL    HEMOGLOBIN 13.6 13.2 - 17.1 g/dL    HEMATOCRIT 42.1 38.5 - 50.0 %    MCV 98.6 80.0 - 100.0 fL    MCH 31.9 27.0 - 33.0 pg    MCHC 32.3 32.0 - 36.0 g/dL    RDW 13.4 11.0 - 15.0 %    PLATELET  COUNT 288 140 - 400 Thousand/uL    MPV 9.9 7.5 - 12.5 fL   TSH with reflex to Free T4 if abnormal    Collection Time: 05/19/25  9:01 AM   Result Value Ref Range    TSH W/REFLEX TO FT4 2.76 0.40 - 4.50 mIU/L   Vitamin D 25-Hydroxy,Total (for eval of Vitamin D levels)    Collection Time: 05/19/25  9:01 AM   Result Value Ref Range    VITAMIN D,25-OH,TOTAL,IA 45 30 - 100 ng/mL   Hemoglobin A1C    Collection Time: 05/19/25  9:01 AM   Result Value Ref Range    HEMOGLOBIN A1c 5.3 <5.7 %    eAG (mg/dL) 105 mg/dL    eAG (mmol/L) 5.8 mmol/L   Prostate Specific Antigen, Screen    Collection Time: 05/19/25  9:03 AM   Result Value Ref Range    PSA, TOTAL 0.54 < OR = 4.00 ng/mL     Recent labs reviewed with patient overall numbers are stable he will continue current medications we reviewed low-fat low-cholesterol diabetic diet and weight loss strategies.  Return to our office in 6 months with repeat fasting lab      Assessment & Plan  Medicare annual wellness visit, subsequent  Recent labs reviewed    Immunization recommendations reviewed he declines flu and pneumonia shot    Colon cancer screening up-to-date         Annual physical exam  Recent labs reviewed  lifeStyle modifications reviewed including diet exercise and weight control strategies         Paroxysmal A-fib (Multi)  Stable patient anticoagulated with Xarelto         Benign essential hypertension  Stable continue current medication         Impaired fasting glucose  A1c at 5.3% continue dietary modifications         Other emphysema (Multi)  Clinically stable no longer smoking         Vitamin D deficiency  Continue to monitor supplement as needed         Chronic heart failure with preserved ejection fraction  Clinically stable continue current treatment

## 2025-05-21 NOTE — ASSESSMENT & PLAN NOTE
Recent labs reviewed  lifeStyle modifications reviewed including diet exercise and weight control strategies

## 2025-11-20 ENCOUNTER — APPOINTMENT (OUTPATIENT)
Dept: PRIMARY CARE | Facility: CLINIC | Age: 65
End: 2025-11-20
Payer: MEDICARE

## (undated) DEVICE — SOLUTION, IRRIGATION, SODIUM CHLORIDE 0.9%, 1000 ML, POUR BOTTLE

## (undated) DEVICE — BANDAGE, ELASTIC, PREMIUM, SELF-CLOSE, 2 IN X 5 YD, STERILE

## (undated) DEVICE — SYRINGE, 10 CC, LUER LOCK

## (undated) DEVICE — DRESSING, WOUND, POST OP, 10 X 4

## (undated) DEVICE — DRAPE, SHEET, ENDOSCOPY, GENERAL, FENESTRATED, ARMBOARD COVER, 98 X 123.5 IN, DISPOSABLE, LF, STERILE

## (undated) DEVICE — Device

## (undated) DEVICE — DRESSING, GAUZE, SPONGE, VERSALON, ALL PURPOSE, 4 X 4 IN, SOFT

## (undated) DEVICE — SUTURE, SILK, 1, 30 IN, LABYRINTH, BLACK

## (undated) DEVICE — DRESSING, GAUZE, PETROLATUM, STRIP, XEROFORM, 1 X 8 IN, STERILE

## (undated) DEVICE — DRESSING, GAUZE, SPONGE, 12 PLY, 4 X 4 IN, PLASTIC POUCH, STRL 10PK

## (undated) DEVICE — LIGASURE IMPACT, 18CM

## (undated) DEVICE — SUTURE, VICRYL, 3-0, 27 IN, SH

## (undated) DEVICE — GLOVE, PROTEXIS PI CLASSIC, SZ-8.0, PF, PF, LF

## (undated) DEVICE — GLOVE, SURGICAL, PROTEXIS MICRO, 7.5, PF, LATEX

## (undated) DEVICE — SPONGE, DISSECTING, PEANUT, PEEL, STERILE 5

## (undated) DEVICE — SUTURE, SILK, 3-0, 30 IN, MULTIPACK, BLACK

## (undated) DEVICE — SUTURE, SILK, 2-0, TIES, 12-30 IN, BLACK

## (undated) DEVICE — SPONGE, LAP, XRAY DECT, MASTER TAG, N-D, 18 X 36 IN

## (undated) DEVICE — COVER HANDLE LIGHT, STERIS, BLUE, STERILE

## (undated) DEVICE — PREP, SCRUB, SKIN, FOAM, HIBICLENS, 4 OZ

## (undated) DEVICE — STAPLER, LINEAR, 3.5 60MM, RELOADABLE, BLUE

## (undated) DEVICE — SUTURE, SILK, 2-0, 18 IN, BLACK

## (undated) DEVICE — SUTURE, CHROMIC GUT, 3-0, SH 27"

## (undated) DEVICE — BLADE, SMARTRELEASE, ONYX, CARPAL TUNNEL, DISP

## (undated) DEVICE — CUTTER,  PROX LINEAR, 55MM, REG TISSUE, W/ SAFETY LOCK OUT

## (undated) DEVICE — SUTURE, SILK, 3-0, 30 IN, BR SH, BLACK

## (undated) DEVICE — APPLICATOR, CHLORAPREP, W/ORANGE TINT, 26ML

## (undated) DEVICE — PADDING, CAST, SOFT, 2 X 4YDS

## (undated) DEVICE — CUFF, TOURNIQUET, 18 X 4, DUAL PORT/SNGL BLADDER, DISP, LF

## (undated) DEVICE — KIT, UPPER EXTREMITY, CUSTOM, PORTAGE

## (undated) DEVICE — PACKING, IODOFORM, NU GAUZE, 2 IN X 5 YD, STERILE

## (undated) DEVICE — PREP TRAY, SKIN, DRY, W/GLOVES

## (undated) DEVICE — COUNTER, NEEDLE, 1315 MAGNATIC/ FOAM, W/ BOXLOCKS

## (undated) DEVICE — GOWN, ASTOUND, XL

## (undated) DEVICE — BINDER, ABDOMINAL, 4 PANEL, 12 X 46-62 IN

## (undated) DEVICE — CUTTER,  PROXIMATE LINEAR RELOAD, 55MM, BLUE

## (undated) DEVICE — TRAY, SURESTEP, URINE METER, 16FR, COMPLETE, W/STATLOCK

## (undated) DEVICE — STAPLER, SKIN, MULTIFIRE, PREMIUM, WIDE, 35 W

## (undated) DEVICE — SUTURE, ETHILON, 4-0, BLK, MONO, PS-2 18

## (undated) DEVICE — TOWEL PACK, STERILE, 4/PACK, BLUE

## (undated) DEVICE — HANDPIECE, POOLE SUCTION, W/O TUBING

## (undated) DEVICE — SUTURE, PDSII, 1, TP-1, VIL, MONO, 48LP